# Patient Record
Sex: MALE | Race: BLACK OR AFRICAN AMERICAN | Employment: OTHER | URBAN - METROPOLITAN AREA
[De-identification: names, ages, dates, MRNs, and addresses within clinical notes are randomized per-mention and may not be internally consistent; named-entity substitution may affect disease eponyms.]

---

## 2017-01-01 ENCOUNTER — IP HISTORICAL/CONVERTED ENCOUNTER (OUTPATIENT)
Dept: OTHER | Age: 67
End: 2017-01-01

## 2018-07-08 ENCOUNTER — IP HISTORICAL/CONVERTED ENCOUNTER (OUTPATIENT)
Dept: OTHER | Age: 68
End: 2018-07-08

## 2019-01-01 ENCOUNTER — ED HISTORICAL/CONVERTED ENCOUNTER (OUTPATIENT)
Dept: OTHER | Age: 69
End: 2019-01-01

## 2019-01-01 ENCOUNTER — OP HISTORICAL/CONVERTED ENCOUNTER (OUTPATIENT)
Dept: OTHER | Age: 69
End: 2019-01-01

## 2019-03-23 ENCOUNTER — ED HISTORICAL/CONVERTED ENCOUNTER (OUTPATIENT)
Dept: OTHER | Age: 69
End: 2019-03-23

## 2019-03-25 ENCOUNTER — ED HISTORICAL/CONVERTED ENCOUNTER (OUTPATIENT)
Dept: OTHER | Age: 69
End: 2019-03-25

## 2020-01-01 ENCOUNTER — APPOINTMENT (OUTPATIENT)
Dept: GENERAL RADIOLOGY | Age: 70
DRG: 870 | End: 2020-01-01
Attending: INTERNAL MEDICINE
Payer: MEDICARE

## 2020-01-01 ENCOUNTER — TELEPHONE (OUTPATIENT)
Dept: SURGERY | Age: 70
End: 2020-01-01

## 2020-01-01 ENCOUNTER — OFFICE VISIT (OUTPATIENT)
Dept: SURGERY | Age: 70
End: 2020-01-01
Payer: MEDICARE

## 2020-01-01 ENCOUNTER — IP HISTORICAL/CONVERTED ENCOUNTER (OUTPATIENT)
Dept: OTHER | Age: 70
End: 2020-01-01

## 2020-01-01 ENCOUNTER — APPOINTMENT (OUTPATIENT)
Dept: CT IMAGING | Age: 70
DRG: 291 | End: 2020-01-01
Attending: EMERGENCY MEDICINE
Payer: MEDICARE

## 2020-01-01 ENCOUNTER — HOSPITAL ENCOUNTER (INPATIENT)
Age: 70
LOS: 11 days | DRG: 870 | End: 2020-10-06
Attending: INTERNAL MEDICINE | Admitting: HOSPITALIST
Payer: MEDICARE

## 2020-01-01 ENCOUNTER — APPOINTMENT (OUTPATIENT)
Dept: GENERAL RADIOLOGY | Age: 70
DRG: 291 | End: 2020-01-01
Attending: EMERGENCY MEDICINE
Payer: MEDICARE

## 2020-01-01 ENCOUNTER — APPOINTMENT (OUTPATIENT)
Dept: GENERAL RADIOLOGY | Age: 70
End: 2020-01-01
Attending: EMERGENCY MEDICINE
Payer: MEDICARE

## 2020-01-01 ENCOUNTER — OP HISTORICAL/CONVERTED ENCOUNTER (OUTPATIENT)
Dept: OTHER | Age: 70
End: 2020-01-01

## 2020-01-01 ENCOUNTER — APPOINTMENT (OUTPATIENT)
Dept: GENERAL RADIOLOGY | Age: 70
DRG: 291 | End: 2020-01-01
Attending: HOSPITALIST
Payer: MEDICARE

## 2020-01-01 ENCOUNTER — HOSPITAL ENCOUNTER (EMERGENCY)
Age: 70
Discharge: HOME | End: 2020-09-20
Attending: EMERGENCY MEDICINE | Admitting: EMERGENCY MEDICINE
Payer: MEDICARE

## 2020-01-01 ENCOUNTER — HOSPITAL ENCOUNTER (INPATIENT)
Age: 70
LOS: 4 days | Discharge: ANOTHER HOSPITAL | DRG: 291 | End: 2020-09-25
Attending: EMERGENCY MEDICINE | Admitting: HOSPITALIST
Payer: MEDICARE

## 2020-01-01 ENCOUNTER — ED HISTORICAL/CONVERTED ENCOUNTER (OUTPATIENT)
Dept: OTHER | Age: 70
End: 2020-01-01

## 2020-01-01 ENCOUNTER — HOSPITAL ENCOUNTER (OUTPATIENT)
Dept: LAB | Age: 70
Discharge: HOME | End: 2020-08-25

## 2020-01-01 VITALS
TEMPERATURE: 98.3 F | DIASTOLIC BLOOD PRESSURE: 52 MMHG | OXYGEN SATURATION: 100 % | HEIGHT: 69 IN | WEIGHT: 172.84 LBS | BODY MASS INDEX: 25.6 KG/M2 | SYSTOLIC BLOOD PRESSURE: 97 MMHG | HEART RATE: 116 BPM | RESPIRATION RATE: 12 BRPM

## 2020-01-01 VITALS
WEIGHT: 151.3 LBS | SYSTOLIC BLOOD PRESSURE: 123 MMHG | TEMPERATURE: 93.2 F | DIASTOLIC BLOOD PRESSURE: 72 MMHG | BODY MASS INDEX: 22.41 KG/M2 | HEIGHT: 69 IN | OXYGEN SATURATION: 93 % | HEART RATE: 120 BPM | RESPIRATION RATE: 16 BRPM

## 2020-01-01 VITALS
SYSTOLIC BLOOD PRESSURE: 189 MMHG | DIASTOLIC BLOOD PRESSURE: 93 MMHG | TEMPERATURE: 99.6 F | BODY MASS INDEX: 25.01 KG/M2 | OXYGEN SATURATION: 92 % | RESPIRATION RATE: 18 BRPM | HEART RATE: 98 BPM | WEIGHT: 165 LBS | HEIGHT: 68 IN

## 2020-01-01 VITALS
BODY MASS INDEX: 24.29 KG/M2 | DIASTOLIC BLOOD PRESSURE: 57 MMHG | OXYGEN SATURATION: 97 % | HEIGHT: 69 IN | TEMPERATURE: 97.7 F | SYSTOLIC BLOOD PRESSURE: 126 MMHG | WEIGHT: 164 LBS | HEART RATE: 85 BPM

## 2020-01-01 VITALS — TEMPERATURE: 100.7 F

## 2020-01-01 DIAGNOSIS — T82.590D MALFUNCTION OF ARTERIOVENOUS DIALYSIS FISTULA, SUBSEQUENT ENCOUNTER: Primary | ICD-10-CM

## 2020-01-01 DIAGNOSIS — I50.41 ACUTE COMBINED SYSTOLIC AND DIASTOLIC CONGESTIVE HEART FAILURE (HCC): Primary | ICD-10-CM

## 2020-01-01 DIAGNOSIS — N18.6 ANEMIA DUE TO CHRONIC KIDNEY DISEASE, ON CHRONIC DIALYSIS (HCC): ICD-10-CM

## 2020-01-01 DIAGNOSIS — D63.1 ANEMIA DUE TO CHRONIC KIDNEY DISEASE, ON CHRONIC DIALYSIS (HCC): ICD-10-CM

## 2020-01-01 DIAGNOSIS — T82.898S AV FISTULA OCCLUSION, SEQUELA: Primary | ICD-10-CM

## 2020-01-01 DIAGNOSIS — R10.84 GENERALIZED ABDOMINAL PAIN: ICD-10-CM

## 2020-01-01 DIAGNOSIS — Z99.2 ANEMIA DUE TO CHRONIC KIDNEY DISEASE, ON CHRONIC DIALYSIS (HCC): ICD-10-CM

## 2020-01-01 DIAGNOSIS — G89.4 CHRONIC PAIN SYNDROME: ICD-10-CM

## 2020-01-01 DIAGNOSIS — M79.602 LEFT ARM PAIN: Primary | ICD-10-CM

## 2020-01-01 DIAGNOSIS — U07.1 COVID-19 VIRUS INFECTION: Primary | ICD-10-CM

## 2020-01-01 DIAGNOSIS — K29.70 GASTRITIS WITHOUT BLEEDING, UNSPECIFIED CHRONICITY, UNSPECIFIED GASTRITIS TYPE: ICD-10-CM

## 2020-01-01 DIAGNOSIS — N18.9 CHRONIC RENAL FAILURE, UNSPECIFIED CKD STAGE: ICD-10-CM

## 2020-01-01 LAB
ABO + RH BLD: NORMAL
ALBUMIN SERPL-MCNC: 1.2 G/DL (ref 3.5–5)
ALBUMIN SERPL-MCNC: 1.3 G/DL (ref 3.5–5)
ALBUMIN SERPL-MCNC: 1.4 G/DL (ref 3.5–5)
ALBUMIN SERPL-MCNC: 1.6 G/DL (ref 3.5–5)
ALBUMIN SERPL-MCNC: 1.8 G/DL (ref 3.5–5)
ALBUMIN SERPL-MCNC: 1.9 G/DL (ref 3.5–5)
ALBUMIN SERPL-MCNC: 2 G/DL (ref 3.5–5)
ALBUMIN SERPL-MCNC: 2.1 G/DL (ref 3.5–5)
ALBUMIN SERPL-MCNC: 2.1 G/DL (ref 3.5–5)
ALBUMIN SERPL-MCNC: 2.4 G/DL (ref 3.5–5)
ALBUMIN SERPL-MCNC: 2.5 G/DL (ref 3.5–5)
ALBUMIN SERPL-MCNC: 2.8 G/DL (ref 3.5–5)
ALBUMIN/GLOB SERPL: 0.3 {RATIO} (ref 1.1–2.2)
ALBUMIN/GLOB SERPL: 0.5 {RATIO} (ref 1.1–2.2)
ALP SERPL-CCNC: 125 U/L (ref 45–117)
ALP SERPL-CCNC: 134 U/L (ref 45–117)
ALP SERPL-CCNC: 134 U/L (ref 45–117)
ALP SERPL-CCNC: 154 U/L (ref 45–117)
ALP SERPL-CCNC: 211 U/L (ref 45–117)
ALT SERPL-CCNC: 144 U/L (ref 12–78)
ALT SERPL-CCNC: 160 U/L (ref 12–78)
ALT SERPL-CCNC: 21 U/L (ref 12–78)
ALT SERPL-CCNC: 28 U/L (ref 12–78)
ALT SERPL-CCNC: 31 U/L (ref 12–78)
AMYLASE SERPL-CCNC: 83 U/L (ref 25–115)
ANION GAP SERPL CALC-SCNC: 10 MMOL/L (ref 5–15)
ANION GAP SERPL CALC-SCNC: 10 MMOL/L (ref 5–15)
ANION GAP SERPL CALC-SCNC: 11 MMOL/L (ref 5–15)
ANION GAP SERPL CALC-SCNC: 12 MMOL/L (ref 5–15)
ANION GAP SERPL CALC-SCNC: 12 MMOL/L (ref 5–15)
ANION GAP SERPL CALC-SCNC: 14 MMOL/L (ref 5–15)
ANION GAP SERPL CALC-SCNC: 17 MMOL/L (ref 5–15)
ANION GAP SERPL CALC-SCNC: 27 MMOL/L (ref 5–15)
ANION GAP SERPL CALC-SCNC: 7 MMOL/L (ref 5–15)
ANION GAP SERPL CALC-SCNC: 8 MMOL/L (ref 5–15)
APTT PPP: 25.8 SEC (ref 25–37.1)
APTT PPP: 37.8 SEC (ref 25–37.1)
ARTERIAL PATENCY WRIST A: ABNORMAL
ARTERIAL PATENCY WRIST A: POSITIVE
ARTERIAL PATENCY WRIST A: POSITIVE
ARTERIAL PATENCY WRIST A: YES
AST SERPL W P-5'-P-CCNC: 28 U/L (ref 15–37)
AST SERPL W P-5'-P-CCNC: 304 U/L (ref 15–37)
AST SERPL W P-5'-P-CCNC: 321 U/L (ref 15–37)
AST SERPL W P-5'-P-CCNC: 33 U/L (ref 15–37)
AST SERPL W P-5'-P-CCNC: 93 U/L (ref 15–37)
ATRIAL RATE: 0 BPM
ATRIAL RATE: 105 BPM
ATRIAL RATE: 88 BPM
BASE DEFICIT BLDA-SCNC: 0.1 MMOL/L (ref 0–2)
BASE DEFICIT BLDA-SCNC: 1.4 MMOL/L (ref 0–2)
BASE DEFICIT BLDA-SCNC: 1.5 MMOL/L (ref 0–2)
BASE DEFICIT BLDA-SCNC: 1.8 MMOL/L (ref 0–2)
BASE DEFICIT BLDA-SCNC: 17 MMOL/L (ref 0–2)
BASE DEFICIT BLDA-SCNC: 2.2 MMOL/L (ref 0–2)
BASE DEFICIT BLDA-SCNC: 4.9 MMOL/L (ref 0–2)
BASE EXCESS BLDA CALC-SCNC: 0 MMOL/L (ref 0–2)
BASE EXCESS BLDA CALC-SCNC: 0.9 MMOL/L (ref 0–2)
BASE EXCESS BLDA CALC-SCNC: 5.8 MMOL/L (ref 0–2)
BASOPHILS # BLD: 0 K/UL (ref 0–0.1)
BASOPHILS # BLD: 0 K/UL (ref 0–0.2)
BASOPHILS # BLD: 0.1 K/UL (ref 0–0.2)
BASOPHILS NFR BLD: 0 % (ref 0–1)
BASOPHILS NFR BLD: 0 % (ref 0–2.5)
BASOPHILS NFR BLD: 1 % (ref 0–2.5)
BDY SITE: ABNORMAL
BILIRUB SERPL-MCNC: 0.7 MG/DL (ref 0.2–1)
BILIRUB SERPL-MCNC: 0.9 MG/DL (ref 0.2–1)
BILIRUB SERPL-MCNC: 2.4 MG/DL (ref 0.2–1)
BLD PROD TYP BPU: NORMAL
BLOOD BANK CMNT PATIENT-IMP: NORMAL
BLOOD GROUP ANTIBODIES SERPL: NEGATIVE
BNP SERPL-MCNC: ABNORMAL PG/ML
BPU ID: NORMAL
BREATHS.SPONTANEOUS ON VENT: 20
BUN SERPL-MCNC: 106 MG/DL (ref 6–20)
BUN SERPL-MCNC: 108 MG/DL (ref 6–20)
BUN SERPL-MCNC: 31 MG/DL (ref 6–20)
BUN SERPL-MCNC: 37 MG/DL (ref 6–20)
BUN SERPL-MCNC: 40 MG/DL (ref 6–20)
BUN SERPL-MCNC: 44 MG/DL (ref 6–20)
BUN SERPL-MCNC: 52 MG/DL (ref 6–20)
BUN SERPL-MCNC: 53 MG/DL (ref 6–20)
BUN SERPL-MCNC: 55 MG/DL (ref 6–20)
BUN SERPL-MCNC: 64 MG/DL (ref 6–20)
BUN SERPL-MCNC: 65 MG/DL (ref 6–20)
BUN SERPL-MCNC: 70 MG/DL (ref 6–20)
BUN SERPL-MCNC: 76 MG/DL (ref 6–20)
BUN SERPL-MCNC: 93 MG/DL (ref 6–20)
BUN/CREAT SERPL: 10 (ref 12–20)
BUN/CREAT SERPL: 10 (ref 12–20)
BUN/CREAT SERPL: 11 (ref 12–20)
BUN/CREAT SERPL: 12 (ref 12–20)
BUN/CREAT SERPL: 13 (ref 12–20)
BUN/CREAT SERPL: 14 (ref 12–20)
BUN/CREAT SERPL: 8 (ref 12–20)
BUN/CREAT SERPL: 9 (ref 12–20)
CA-I BLD-MCNC: 10.1 MG/DL (ref 8.5–10.1)
CA-I BLD-MCNC: 10.2 MG/DL (ref 8.5–10.1)
CA-I BLD-MCNC: 10.3 MG/DL (ref 8.5–10.1)
CA-I BLD-MCNC: 10.5 MG/DL (ref 8.5–10.1)
CA-I BLD-MCNC: 11.1 MG/DL (ref 8.5–10.1)
CA-I BLD-MCNC: 8 MG/DL (ref 8.5–10.1)
CA-I BLD-MCNC: 8 MG/DL (ref 8.5–10.1)
CA-I BLD-MCNC: 8.1 MG/DL (ref 8.5–10.1)
CA-I BLD-MCNC: 8.2 MG/DL (ref 8.5–10.1)
CA-I BLD-MCNC: 8.2 MG/DL (ref 8.5–10.1)
CA-I BLD-MCNC: 8.4 MG/DL (ref 8.5–10.1)
CA-I BLD-MCNC: 8.5 MG/DL (ref 8.5–10.1)
CA-I BLD-MCNC: 8.9 MG/DL (ref 8.5–10.1)
CA-I BLD-MCNC: 9.1 MG/DL (ref 8.5–10.1)
CA-I BLD-MCNC: 9.7 MG/DL (ref 8.5–10.1)
CA-I BLD-MCNC: 9.8 MG/DL (ref 8.5–10.1)
CALCULATED P AXIS, ECG09: 59 DEGREES
CALCULATED P AXIS, ECG09: 66 DEGREES
CALCULATED R AXIS, ECG10: -37 DEGREES
CALCULATED R AXIS, ECG10: 2 DEGREES
CALCULATED R AXIS, ECG10: 23 DEGREES
CALCULATED T AXIS, ECG11: -39 DEGREES
CALCULATED T AXIS, ECG11: 115 DEGREES
CALCULATED T AXIS, ECG11: 75 DEGREES
CHLORIDE SERPL-SCNC: 100 MMOL/L (ref 97–108)
CHLORIDE SERPL-SCNC: 102 MMOL/L (ref 97–108)
CHLORIDE SERPL-SCNC: 103 MMOL/L (ref 97–108)
CHLORIDE SERPL-SCNC: 105 MMOL/L (ref 97–108)
CHLORIDE SERPL-SCNC: 87 MMOL/L (ref 97–108)
CHLORIDE SERPL-SCNC: 89 MMOL/L (ref 97–108)
CHLORIDE SERPL-SCNC: 90 MMOL/L (ref 97–108)
CHLORIDE SERPL-SCNC: 93 MMOL/L (ref 97–108)
CHLORIDE SERPL-SCNC: 97 MMOL/L (ref 97–108)
CHLORIDE SERPL-SCNC: 99 MMOL/L (ref 97–108)
CO2 SERPL-SCNC: 12 MMOL/L (ref 21–32)
CO2 SERPL-SCNC: 20 MMOL/L (ref 21–32)
CO2 SERPL-SCNC: 21 MMOL/L (ref 21–32)
CO2 SERPL-SCNC: 22 MMOL/L (ref 21–32)
CO2 SERPL-SCNC: 23 MMOL/L (ref 21–32)
CO2 SERPL-SCNC: 23 MMOL/L (ref 21–32)
CO2 SERPL-SCNC: 24 MMOL/L (ref 21–32)
CO2 SERPL-SCNC: 25 MMOL/L (ref 21–32)
CO2 SERPL-SCNC: 26 MMOL/L (ref 21–32)
CO2 SERPL-SCNC: 27 MMOL/L (ref 21–32)
CO2 SERPL-SCNC: 29 MMOL/L (ref 21–32)
CO2 SERPL-SCNC: 29 MMOL/L (ref 21–32)
COHGB MFR BLD: 0.3 % (ref 0–5)
CREAT SERPL-MCNC: 2.43 MG/DL (ref 0.7–1.3)
CREAT SERPL-MCNC: 2.73 MG/DL (ref 0.7–1.3)
CREAT SERPL-MCNC: 3.25 MG/DL (ref 0.7–1.3)
CREAT SERPL-MCNC: 4.11 MG/DL (ref 0.7–1.3)
CREAT SERPL-MCNC: 4.12 MG/DL (ref 0.7–1.3)
CREAT SERPL-MCNC: 4.87 MG/DL (ref 0.7–1.3)
CREAT SERPL-MCNC: 4.89 MG/DL (ref 0.7–1.3)
CREAT SERPL-MCNC: 4.95 MG/DL (ref 0.7–1.3)
CREAT SERPL-MCNC: 5.29 MG/DL (ref 0.7–1.3)
CREAT SERPL-MCNC: 5.33 MG/DL (ref 0.7–1.3)
CREAT SERPL-MCNC: 5.99 MG/DL (ref 0.7–1.3)
CREAT SERPL-MCNC: 6.99 MG/DL (ref 0.7–1.3)
CREAT SERPL-MCNC: 7.49 MG/DL (ref 0.7–1.3)
CREAT SERPL-MCNC: 7.52 MG/DL (ref 0.7–1.3)
CREAT SERPL-MCNC: 7.86 MG/DL (ref 0.7–1.3)
CREAT SERPL-MCNC: 8.77 MG/DL (ref 0.7–1.3)
CROSSMATCH RESULT,%XM: NORMAL
CULTURE,CULT: ABNORMAL
CULTURE,CULT: ABNORMAL
CULTURE,CULT: NORMAL
DATE LAST DOSE: 0
DATE LAST DOSE: 27
DATE LAST DOSE: ABNORMAL
DATE LAST DOSE: NORMAL
DATE LAST DOSE: NORMAL
DIAGNOSIS, 93000: NORMAL
DIFFERENTIAL METHOD BLD: ABNORMAL
EOSINOPHIL # BLD: 0 K/UL (ref 0–0.4)
EOSINOPHIL # BLD: 0 K/UL (ref 0–0.7)
EOSINOPHIL # BLD: 0 K/UL (ref 0–0.7)
EOSINOPHIL # BLD: 0.1 K/UL (ref 0–0.7)
EOSINOPHIL # BLD: 0.1 K/UL (ref 0–0.7)
EOSINOPHIL NFR BLD: 0 % (ref 0.9–2.9)
EOSINOPHIL NFR BLD: 0 % (ref 0.9–2.9)
EOSINOPHIL NFR BLD: 0 % (ref 0–7)
EOSINOPHIL NFR BLD: 1 % (ref 0.9–2.9)
EOSINOPHIL NFR BLD: 1 % (ref 0.9–2.9)
EPAP/CPAP/PEEP, PAPEEP: 5
ERYTHROCYTE [DISTWIDTH] IN BLOOD BY AUTOMATED COUNT: 17.2 % (ref 11.5–14.5)
ERYTHROCYTE [DISTWIDTH] IN BLOOD BY AUTOMATED COUNT: 17.4 % (ref 11.5–14.5)
ERYTHROCYTE [DISTWIDTH] IN BLOOD BY AUTOMATED COUNT: 17.5 % (ref 11.5–14.5)
ERYTHROCYTE [DISTWIDTH] IN BLOOD BY AUTOMATED COUNT: 17.6 % (ref 11.5–14.5)
ERYTHROCYTE [DISTWIDTH] IN BLOOD BY AUTOMATED COUNT: 17.7 % (ref 11.5–14.5)
ERYTHROCYTE [DISTWIDTH] IN BLOOD BY AUTOMATED COUNT: 17.9 % (ref 11.5–14.5)
ERYTHROCYTE [DISTWIDTH] IN BLOOD BY AUTOMATED COUNT: 18.1 % (ref 11.5–14.5)
ERYTHROCYTE [DISTWIDTH] IN BLOOD BY AUTOMATED COUNT: 18.1 % (ref 11.5–14.5)
ERYTHROCYTE [DISTWIDTH] IN BLOOD BY AUTOMATED COUNT: 18.2 % (ref 11.5–14.5)
ERYTHROCYTE [DISTWIDTH] IN BLOOD BY AUTOMATED COUNT: 18.9 % (ref 11.5–14.5)
FERRITIN SERPL-MCNC: 240 NG/ML (ref 26–388)
FIO2 ON VENT: 100 %
FIO2 ON VENT: 25 %
FIO2 ON VENT: 30 %
FIO2 ON VENT: 30 %
FIO2 ON VENT: 35 %
FOLATE SERPL-MCNC: 4.5 NG/ML (ref 5–21)
GAS FLOW.O2 SETTING OXYMISER: 12 L/MIN
GAS FLOW.O2 SETTING OXYMISER: 12 L/MIN
GAS FLOW.O2 SETTING OXYMISER: 18 L/MIN
GAS FLOW.O2 SETTING OXYMISER: 8 L/MIN
GLOBULIN SER CALC-MCNC: 4.3 G/DL (ref 2–4)
GLOBULIN SER CALC-MCNC: 4.6 G/DL (ref 2–4)
GLOBULIN SER CALC-MCNC: 4.9 G/DL (ref 2–4)
GLOBULIN SER CALC-MCNC: 5.2 G/DL (ref 2–4)
GLOBULIN SER CALC-MCNC: 5.6 G/DL (ref 2–4)
GLUCOSE BLD STRIP.AUTO-MCNC: 146 MG/DL (ref 65–100)
GLUCOSE BLD STRIP.AUTO-MCNC: 165 MG/DL (ref 65–100)
GLUCOSE BLD STRIP.AUTO-MCNC: 170 MG/DL (ref 65–100)
GLUCOSE BLD STRIP.AUTO-MCNC: 172 MG/DL (ref 65–100)
GLUCOSE BLD STRIP.AUTO-MCNC: 172 MG/DL (ref 65–100)
GLUCOSE BLD STRIP.AUTO-MCNC: 173 MG/DL (ref 65–100)
GLUCOSE BLD STRIP.AUTO-MCNC: 175 MG/DL (ref 65–100)
GLUCOSE BLD STRIP.AUTO-MCNC: 188 MG/DL (ref 65–100)
GLUCOSE BLD STRIP.AUTO-MCNC: 193 MG/DL (ref 65–100)
GLUCOSE BLD STRIP.AUTO-MCNC: 193 MG/DL (ref 65–100)
GLUCOSE BLD STRIP.AUTO-MCNC: 198 MG/DL (ref 65–100)
GLUCOSE BLD STRIP.AUTO-MCNC: 231 MG/DL (ref 65–100)
GLUCOSE BLD STRIP.AUTO-MCNC: 234 MG/DL (ref 65–100)
GLUCOSE BLD STRIP.AUTO-MCNC: 246 MG/DL (ref 65–100)
GLUCOSE BLD STRIP.AUTO-MCNC: 262 MG/DL (ref 65–100)
GLUCOSE BLD STRIP.AUTO-MCNC: 288 MG/DL (ref 65–100)
GLUCOSE BLD STRIP.AUTO-MCNC: 291 MG/DL (ref 65–100)
GLUCOSE BLD STRIP.AUTO-MCNC: 292 MG/DL (ref 65–100)
GLUCOSE BLD STRIP.AUTO-MCNC: 300 MG/DL (ref 65–100)
GLUCOSE BLD STRIP.AUTO-MCNC: 364 MG/DL (ref 65–100)
GLUCOSE BLD STRIP.AUTO-MCNC: 75 MG/DL (ref 65–100)
GLUCOSE SERPL-MCNC: 121 MG/DL (ref 65–100)
GLUCOSE SERPL-MCNC: 142 MG/DL (ref 65–100)
GLUCOSE SERPL-MCNC: 146 MG/DL (ref 65–100)
GLUCOSE SERPL-MCNC: 152 MG/DL (ref 65–100)
GLUCOSE SERPL-MCNC: 163 MG/DL (ref 65–100)
GLUCOSE SERPL-MCNC: 178 MG/DL (ref 65–100)
GLUCOSE SERPL-MCNC: 178 MG/DL (ref 65–100)
GLUCOSE SERPL-MCNC: 191 MG/DL (ref 65–100)
GLUCOSE SERPL-MCNC: 191 MG/DL (ref 65–100)
GLUCOSE SERPL-MCNC: 214 MG/DL (ref 65–100)
GLUCOSE SERPL-MCNC: 229 MG/DL (ref 65–100)
GLUCOSE SERPL-MCNC: 238 MG/DL (ref 65–100)
GLUCOSE SERPL-MCNC: 295 MG/DL (ref 65–100)
GLUCOSE SERPL-MCNC: 309 MG/DL (ref 65–100)
GLUCOSE SERPL-MCNC: 371 MG/DL (ref 65–100)
GLUCOSE SERPL-MCNC: 487 MG/DL (ref 65–100)
GRAM STN SPEC: NORMAL
HBV SURFACE AB SER QL: NONREACTIVE
HBV SURFACE AB SER-ACNC: <3.1 MIU/ML
HBV SURFACE AG SER QL: 0.14 INDEX
HBV SURFACE AG SER QL: NEGATIVE
HCO3 BLDA-SCNC: 10 MMOL/L (ref 22–26)
HCO3 BLDA-SCNC: 20 MMOL/L (ref 22–26)
HCO3 BLDA-SCNC: 23 MMOL/L (ref 22–26)
HCO3 BLDA-SCNC: 24 MMOL/L (ref 22–26)
HCO3 BLDA-SCNC: 25 MMOL/L (ref 22–26)
HCO3 BLDA-SCNC: 25 MMOL/L (ref 22–26)
HCO3 BLDA-SCNC: 30 MMOL/L (ref 22–26)
HCT VFR BLD AUTO: 19.6 % (ref 36.6–50.3)
HCT VFR BLD AUTO: 21.7 % (ref 36.6–50.3)
HCT VFR BLD AUTO: 22.8 % (ref 36.6–50.3)
HCT VFR BLD AUTO: 23.2 % (ref 36.6–50.3)
HCT VFR BLD AUTO: 23.8 % (ref 36.6–50.3)
HCT VFR BLD AUTO: 24.1 % (ref 36.6–50.3)
HCT VFR BLD AUTO: 25 % (ref 36.6–50.3)
HCT VFR BLD AUTO: 25.3 % (ref 36.6–50.3)
HCT VFR BLD AUTO: 27.9 % (ref 36.6–50.3)
HCT VFR BLD AUTO: 28 % (ref 41–53)
HCT VFR BLD AUTO: 28 % (ref 41–53)
HCT VFR BLD AUTO: 28.5 % (ref 36.6–50.3)
HCT VFR BLD AUTO: 28.5 % (ref 41–53)
HCT VFR BLD AUTO: 28.9 % (ref 41–53)
HCT VFR BLD AUTO: 30 % (ref 36.6–50.3)
HCT VFR BLD AUTO: 30.3 % (ref 41–53)
HCV AB SER IA-ACNC: >11 INDEX
HCV AB SERPL QL IA: REACTIVE
HCV COMMENT,HCGAC: ABNORMAL
HGB BLD OXIMETRY-MCNC: 11.2 G/DL (ref 13.5–17.5)
HGB BLD-MCNC: 6.4 G/DL (ref 12.1–17)
HGB BLD-MCNC: 7.1 % (ref 12.1–17)
HGB BLD-MCNC: 7.6 G/DL (ref 12.1–17)
HGB BLD-MCNC: 8 G/DL (ref 12.1–17)
HGB BLD-MCNC: 8.2 % (ref 12.1–17)
HGB BLD-MCNC: 9.2 % (ref 12.1–17)
HGB BLD-MCNC: 9.2 % (ref 13.5–17.5)
HGB BLD-MCNC: 9.2 % (ref 13.5–17.5)
HGB BLD-MCNC: 9.3 % (ref 12.1–17)
HGB BLD-MCNC: 9.3 % (ref 13.5–17.5)
HGB BLD-MCNC: 9.3 % (ref 13.5–17.5)
HGB BLD-MCNC: 9.6 % (ref 13.5–17.5)
HGB BLD-MCNC: 9.9 % (ref 12.1–17)
IMM GRANULOCYTES # BLD AUTO: 0.1 K/UL (ref 0–0.04)
IMM GRANULOCYTES NFR BLD AUTO: 0 % (ref 0–0.5)
IMM GRANULOCYTES NFR BLD AUTO: 1 % (ref 0–0.5)
INR PPP: 1 (ref 0.9–1.1)
INR PPP: 1.6 (ref 0.9–1.1)
INR PPP: 1.6 (ref 0.9–1.1)
IRON SATN MFR SERPL: 17 % (ref 20–50)
IRON SATN MFR SERPL: 6 % (ref 20–50)
IRON SERPL-MCNC: 18 UG/DL (ref 35–150)
IRON SERPL-MCNC: 35 UG/DL (ref 35–150)
LACTATE SERPL-SCNC: 1.4 MMOL/L (ref 0.4–2)
LACTATE SERPL-SCNC: 2 MMOL/L (ref 0.4–2)
LACTATE SERPL-SCNC: 2.8 MMOL/L (ref 0.4–2)
LIPASE SERPL-CCNC: 48 U/L (ref 73–393)
LIPASE SERPL-CCNC: 59 U/L (ref 73–393)
LYMPHOCYTES # BLD: 0.2 K/UL (ref 1–4.8)
LYMPHOCYTES # BLD: 0.3 K/UL (ref 1–4.8)
LYMPHOCYTES # BLD: 0.3 K/UL (ref 1–4.8)
LYMPHOCYTES # BLD: 0.4 K/UL (ref 0.8–3.5)
LYMPHOCYTES # BLD: 0.4 K/UL (ref 1–4.8)
LYMPHOCYTES # BLD: 0.5 K/UL (ref 0.8–3.5)
LYMPHOCYTES # BLD: 0.5 K/UL (ref 0.8–3.5)
LYMPHOCYTES # BLD: 0.7 K/UL (ref 0.8–3.5)
LYMPHOCYTES # BLD: 0.7 K/UL (ref 0.8–3.5)
LYMPHOCYTES # BLD: 1.1 K/UL (ref 0.8–3.5)
LYMPHOCYTES # BLD: 1.2 K/UL (ref 0.8–3.5)
LYMPHOCYTES # BLD: 1.4 K/UL (ref 0.8–3.5)
LYMPHOCYTES NFR BLD: 2 % (ref 12–49)
LYMPHOCYTES NFR BLD: 2 % (ref 20.5–51.1)
LYMPHOCYTES NFR BLD: 2 % (ref 20.5–51.1)
LYMPHOCYTES NFR BLD: 3 % (ref 12–49)
LYMPHOCYTES NFR BLD: 3 % (ref 12–49)
LYMPHOCYTES NFR BLD: 3 % (ref 20.5–51.1)
LYMPHOCYTES NFR BLD: 4 % (ref 12–49)
LYMPHOCYTES NFR BLD: 4 % (ref 20.5–51.1)
LYMPHOCYTES NFR BLD: 5 % (ref 12–49)
LYMPHOCYTES NFR BLD: 6 % (ref 12–49)
LYMPHOCYTES NFR BLD: 8 % (ref 12–49)
LYMPHOCYTES NFR BLD: 8 % (ref 12–49)
MAGNESIUM SERPL-MCNC: 1.9 MG/DL (ref 1.6–2.4)
MAGNESIUM SERPL-MCNC: 2.2 MG/DL (ref 1.6–2.4)
MAGNESIUM SERPL-MCNC: 2.2 MG/DL (ref 1.6–2.4)
MAGNESIUM SERPL-MCNC: 2.4 MG/DL (ref 1.6–2.4)
MAGNESIUM SERPL-MCNC: 2.6 MG/DL (ref 1.6–2.4)
MAGNESIUM SERPL-MCNC: 3.1 MG/DL (ref 1.6–2.4)
MCH RBC QN AUTO: 29.9 PG (ref 26–34)
MCH RBC QN AUTO: 30.3 PG (ref 26–34)
MCH RBC QN AUTO: 30.6 PG (ref 26–34)
MCH RBC QN AUTO: 30.6 PG (ref 26–34)
MCH RBC QN AUTO: 30.7 PG (ref 26–34)
MCH RBC QN AUTO: 31.2 PG (ref 26–34)
MCH RBC QN AUTO: 31.4 PG (ref 26–34)
MCH RBC QN AUTO: 31.6 PG (ref 26–34)
MCH RBC QN AUTO: 31.7 PG (ref 26–34)
MCH RBC QN AUTO: 33.1 PG (ref 31–34)
MCH RBC QN AUTO: 33.7 PG (ref 31–34)
MCH RBC QN AUTO: 33.7 PG (ref 31–34)
MCH RBC QN AUTO: 33.9 PG (ref 31–34)
MCH RBC QN AUTO: 33.9 PG (ref 31–34)
MCHC RBC AUTO-ENTMCNC: 30.5 G/DL (ref 31–36)
MCHC RBC AUTO-ENTMCNC: 32 G/DL (ref 30–36.5)
MCHC RBC AUTO-ENTMCNC: 32.6 G/DL (ref 30–36.5)
MCHC RBC AUTO-ENTMCNC: 32.7 G/DL (ref 30–36.5)
MCHC RBC AUTO-ENTMCNC: 32.7 G/DL (ref 30–36.5)
MCHC RBC AUTO-ENTMCNC: 32.7 G/DL (ref 31–36)
MCHC RBC AUTO-ENTMCNC: 32.8 G/DL (ref 30–36.5)
MCHC RBC AUTO-ENTMCNC: 32.8 G/DL (ref 31–36)
MCHC RBC AUTO-ENTMCNC: 33 G/DL (ref 30–36.5)
MCHC RBC AUTO-ENTMCNC: 33 G/DL (ref 30–36.5)
MCHC RBC AUTO-ENTMCNC: 33.1 G/DL (ref 31–36)
MCHC RBC AUTO-ENTMCNC: 33.1 G/DL (ref 31–36)
MCHC RBC AUTO-ENTMCNC: 33.2 G/DL (ref 30–36.5)
MCHC RBC AUTO-ENTMCNC: 33.3 G/DL (ref 30–36.5)
MCV RBC AUTO: 101.7 FL (ref 80–100)
MCV RBC AUTO: 102.4 FL (ref 80–100)
MCV RBC AUTO: 102.8 FL (ref 80–100)
MCV RBC AUTO: 103.6 FL (ref 80–100)
MCV RBC AUTO: 108.5 FL (ref 80–100)
MCV RBC AUTO: 91.3 FL (ref 80–99)
MCV RBC AUTO: 91.9 FL (ref 80–99)
MCV RBC AUTO: 92.3 FL (ref 80–99)
MCV RBC AUTO: 93.8 FL (ref 80–99)
MCV RBC AUTO: 94.6 FL (ref 80–99)
MCV RBC AUTO: 94.7 FL (ref 80–99)
MCV RBC AUTO: 96.2 FL (ref 80–99)
MCV RBC AUTO: 96.3 FL (ref 80–99)
MCV RBC AUTO: 96.4 FL (ref 80–99)
METHGB MFR BLD: 0.1 % (ref 0–5)
MONOCYTES # BLD: 0.3 K/UL (ref 0–1)
MONOCYTES # BLD: 0.6 K/UL (ref 0.2–2.4)
MONOCYTES # BLD: 0.6 K/UL (ref 0–1)
MONOCYTES # BLD: 0.8 K/UL (ref 0.2–2.4)
MONOCYTES # BLD: 0.8 K/UL (ref 0.2–2.4)
MONOCYTES # BLD: 0.9 K/UL (ref 0.2–2.4)
MONOCYTES # BLD: 1 K/UL (ref 0–1)
MONOCYTES # BLD: 1.2 K/UL (ref 0–1)
MONOCYTES # BLD: 1.5 K/UL (ref 0–1)
MONOCYTES # BLD: 1.5 K/UL (ref 0–1)
MONOCYTES # BLD: 2 K/UL (ref 0–1)
MONOCYTES # BLD: 2 K/UL (ref 0–1)
MONOCYTES NFR BLD: 10 % (ref 1.7–9.3)
MONOCYTES NFR BLD: 10 % (ref 5–13)
MONOCYTES NFR BLD: 10 % (ref 5–13)
MONOCYTES NFR BLD: 3 % (ref 5–13)
MONOCYTES NFR BLD: 3 % (ref 5–13)
MONOCYTES NFR BLD: 4 % (ref 1.7–9.3)
MONOCYTES NFR BLD: 5 % (ref 5–13)
MONOCYTES NFR BLD: 8 % (ref 1.7–9.3)
MONOCYTES NFR BLD: 8 % (ref 5–13)
MONOCYTES NFR BLD: 9 % (ref 1.7–9.3)
MONOCYTES NFR BLD: 9 % (ref 5–13)
MONOCYTES NFR BLD: 9 % (ref 5–13)
NEUTS SEG # BLD: 10.3 K/UL (ref 1.8–8)
NEUTS SEG # BLD: 11.4 K/UL (ref 1.8–8)
NEUTS SEG # BLD: 12.4 K/UL (ref 1.8–8)
NEUTS SEG # BLD: 14.4 K/UL (ref 1.8–7.7)
NEUTS SEG # BLD: 15.7 K/UL (ref 1.8–8)
NEUTS SEG # BLD: 16.9 K/UL (ref 1.8–8)
NEUTS SEG # BLD: 17.3 K/UL (ref 1.8–8)
NEUTS SEG # BLD: 18 K/UL (ref 1.8–8)
NEUTS SEG # BLD: 20.6 K/UL (ref 1.8–8)
NEUTS SEG # BLD: 7.3 K/UL (ref 1.8–7.7)
NEUTS SEG # BLD: 8.2 K/UL (ref 1.8–7.7)
NEUTS SEG # BLD: 8.6 K/UL (ref 1.8–7.7)
NEUTS SEG NFR BLD: 83 % (ref 32–75)
NEUTS SEG NFR BLD: 84 % (ref 32–75)
NEUTS SEG NFR BLD: 85 % (ref 42–75)
NEUTS SEG NFR BLD: 87 % (ref 32–75)
NEUTS SEG NFR BLD: 87 % (ref 32–75)
NEUTS SEG NFR BLD: 87 % (ref 42–75)
NEUTS SEG NFR BLD: 88 % (ref 32–75)
NEUTS SEG NFR BLD: 89 % (ref 32–75)
NEUTS SEG NFR BLD: 89 % (ref 32–75)
NEUTS SEG NFR BLD: 90 % (ref 42–75)
NEUTS SEG NFR BLD: 92 % (ref 32–75)
NEUTS SEG NFR BLD: 93 % (ref 42–75)
NRBC # BLD: 0 K/UL
NRBC # BLD: 0 K/UL (ref 0–0.01)
NRBC # BLD: 0.01 K/UL
NRBC # BLD: 0.01 K/UL
NRBC BLD-RTO: 0 PER 100 WBC
NRBC BLD-RTO: 10 PER 100 WBC
NRBC BLD-RTO: 10 PER 100 WBC
OXYHGB MFR BLD: 99.3 % (ref 95–100)
P-R INTERVAL, ECG05: 114 MS
P-R INTERVAL, ECG05: 156 MS
P-R INTERVAL, ECG05: 190 MS
PCO2 BLDA: 25 MMHG (ref 35–45)
PCO2 BLDA: 26 MMHG (ref 35–45)
PCO2 BLDA: 27 MMHG (ref 35–45)
PCO2 BLDA: 28 MMHG (ref 35–45)
PCO2 BLDA: 28 MMHG (ref 35–45)
PCO2 BLDA: 29 MMHG (ref 35–45)
PCO2 BLDA: 30 MMHG (ref 35–45)
PCO2 BLDA: 30 MMHG (ref 35–45)
PCO2 BLDA: 31 MMHG (ref 35–45)
PCO2 BLDA: 32 MMHG (ref 35–45)
PEEP MAX SETTING VENT: 5 CM[H2O]
PERFORMED BY, TECHID: ABNORMAL
PERFORMED BY, TECHID: NORMAL
PH BLDA: 7.2 [PH] (ref 7.35–7.45)
PH BLDA: 7.43 [PH] (ref 7.35–7.45)
PH BLDA: 7.46 [PH] (ref 7.35–7.45)
PH BLDA: 7.48 [PH] (ref 7.35–7.45)
PH BLDA: 7.49 [PH] (ref 7.35–7.45)
PH BLDA: 7.54 [PH] (ref 7.35–7.45)
PH BLDA: 7.56 [PH] (ref 7.35–7.45)
PHOSPHATE SERPL-MCNC: 2.8 MG/DL (ref 2.6–4.7)
PHOSPHATE SERPL-MCNC: 3.2 MG/DL (ref 2.6–4.7)
PHOSPHATE SERPL-MCNC: 3.9 MG/DL (ref 2.6–4.7)
PHOSPHATE SERPL-MCNC: 4.5 MG/DL (ref 2.6–4.7)
PHOSPHATE SERPL-MCNC: 4.6 MG/DL (ref 2.6–4.7)
PHOSPHATE SERPL-MCNC: 4.7 MG/DL (ref 2.6–4.7)
PHOSPHATE SERPL-MCNC: 4.7 MG/DL (ref 2.6–4.7)
PHOSPHATE SERPL-MCNC: 4.9 MG/DL (ref 2.6–4.7)
PHOSPHATE SERPL-MCNC: 6.4 MG/DL (ref 2.6–4.7)
PHOSPHATE SERPL-MCNC: 7.4 MG/DL (ref 2.6–4.7)
PHOSPHATE SERPL-MCNC: 8.3 MG/DL (ref 2.6–4.7)
PLATELET # BLD AUTO: 115 K/UL (ref 150–400)
PLATELET # BLD AUTO: 119 K/UL
PLATELET # BLD AUTO: 125 K/UL
PLATELET # BLD AUTO: 128 K/UL
PLATELET # BLD AUTO: 143 K/UL
PLATELET # BLD AUTO: 151 K/UL (ref 150–400)
PLATELET # BLD AUTO: 153 K/UL
PLATELET # BLD AUTO: 80 K/UL (ref 150–400)
PLATELET # BLD AUTO: 82 K/UL (ref 150–400)
PLATELET # BLD AUTO: 83 K/UL (ref 150–400)
PLATELET # BLD AUTO: 87 K/UL (ref 150–400)
PLATELET # BLD AUTO: 91 K/UL (ref 150–400)
PLATELET # BLD AUTO: 92 K/UL (ref 150–400)
PLATELET # BLD AUTO: 98 K/UL (ref 150–400)
PMV BLD AUTO: 10.7 FL (ref 6.5–11.5)
PMV BLD AUTO: 11.7 FL (ref 6.5–11.5)
PMV BLD AUTO: 13.3 FL (ref 8.9–12.9)
PMV BLD AUTO: 13.7 FL (ref 8.9–12.9)
PMV BLD AUTO: 9.3 FL (ref 6.5–11.5)
PMV BLD AUTO: 9.6 FL (ref 6.5–11.5)
PMV BLD AUTO: 9.8 FL (ref 6.5–11.5)
PO2 BLDA: 103 MMHG (ref 75–100)
PO2 BLDA: 103 MMHG (ref 75–100)
PO2 BLDA: 107 MMHG (ref 75–100)
PO2 BLDA: 141 MMHG (ref 75–100)
PO2 BLDA: 161 MMHG (ref 75–100)
PO2 BLDA: 168 MMHG (ref 75–100)
PO2 BLDA: 181 MMHG (ref 75–100)
PO2 BLDA: 470 MMHG (ref 70–100)
PO2 BLDA: 88 MMHG (ref 75–100)
PO2 BLDA: 90 MMHG (ref 75–100)
POTASSIUM SERPL-SCNC: 3.8 MMOL/L (ref 3.5–5.1)
POTASSIUM SERPL-SCNC: 3.9 MMOL/L (ref 3.5–5.1)
POTASSIUM SERPL-SCNC: 4.1 MMOL/L (ref 3.5–5.1)
POTASSIUM SERPL-SCNC: 4.1 MMOL/L (ref 3.5–5.1)
POTASSIUM SERPL-SCNC: 4.2 MMOL/L (ref 3.5–5.1)
POTASSIUM SERPL-SCNC: 4.4 MMOL/L (ref 3.5–5.1)
POTASSIUM SERPL-SCNC: 4.4 MMOL/L (ref 3.5–5.1)
POTASSIUM SERPL-SCNC: 4.8 MMOL/L (ref 3.5–5.1)
POTASSIUM SERPL-SCNC: 4.8 MMOL/L (ref 3.5–5.1)
POTASSIUM SERPL-SCNC: 4.9 MMOL/L (ref 3.5–5.1)
POTASSIUM SERPL-SCNC: 5.1 MMOL/L (ref 3.5–5.1)
POTASSIUM SERPL-SCNC: 5.4 MMOL/L (ref 3.5–5.1)
POTASSIUM SERPL-SCNC: 5.5 MMOL/L (ref 3.5–5.1)
POTASSIUM SERPL-SCNC: 5.5 MMOL/L (ref 3.5–5.1)
POTASSIUM SERPL-SCNC: 5.6 MMOL/L (ref 3.5–5.1)
POTASSIUM SERPL-SCNC: 5.9 MMOL/L (ref 3.5–5.1)
PRESSURE SUPPORT SETTING VENT: 10 CM[H2O]
PRESSURE SUPPORT SETTING VENT: 10 CM[H2O]
PRESSURE SUPPORT SETTING VENT: 15 CM[H2O]
PROT SERPL-MCNC: 6.4 G/DL (ref 6.4–8.2)
PROT SERPL-MCNC: 6.7 G/DL (ref 6.4–8.2)
PROT SERPL-MCNC: 7 G/DL (ref 6.4–8.2)
PROT SERPL-MCNC: 7.4 G/DL (ref 6.4–8.2)
PROT SERPL-MCNC: 8 G/DL (ref 6.4–8.2)
PROTHROMBIN TIME: 10.3 SEC (ref 9–11.1)
PROTHROMBIN TIME: 15.7 SEC (ref 9–11.1)
PROTHROMBIN TIME: 19.2 SEC (ref 11.9–14.7)
Q-T INTERVAL, ECG07: 298 MS
Q-T INTERVAL, ECG07: 342 MS
Q-T INTERVAL, ECG07: 396 MS
QRS DURATION, ECG06: 103 MS
QRS DURATION, ECG06: 113 MS
QRS DURATION, ECG06: 86 MS
QTC CALCULATION (BEZET), ECG08: 438 MS
QTC CALCULATION (BEZET), ECG08: 453 MS
QTC CALCULATION (BEZET), ECG08: 479 MS
RBC # BLD AUTO: 2.09 M/UL (ref 4.1–5.7)
RBC # BLD AUTO: 2.25 M/UL (ref 4.1–5.7)
RBC # BLD AUTO: 2.48 M/UL (ref 4.1–5.7)
RBC # BLD AUTO: 2.54 M/UL (ref 4.1–5.7)
RBC # BLD AUTO: 2.61 M/UL (ref 4.1–5.7)
RBC # BLD AUTO: 2.64 M/UL (ref 4.1–5.7)
RBC # BLD AUTO: 2.73 M/UL (ref 4.5–5.9)
RBC # BLD AUTO: 2.75 M/UL (ref 4.5–5.9)
RBC # BLD AUTO: 2.75 M/UL (ref 4.5–5.9)
RBC # BLD AUTO: 2.79 M/UL (ref 4.5–5.9)
RBC # BLD AUTO: 2.82 M/UL (ref 4.5–5.9)
RBC # BLD AUTO: 2.9 M/UL (ref 4.1–5.7)
RBC # BLD AUTO: 2.96 M/UL (ref 4.1–5.7)
RBC # BLD AUTO: 3.17 M/UL (ref 4.1–5.7)
REPORTED DOSE,DOSE: 0 UNITS
REPORTED DOSE,DOSE: 27 UNITS
REPORTED DOSE,DOSE: ABNORMAL UNITS
REPORTED DOSE,DOSE: NORMAL UNITS
REPORTED DOSE,DOSE: NORMAL UNITS
SAO2 % BLD: 100 %
SAO2 % BLD: 98 %
SAO2 % BLD: 98 %
SAO2 % BLD: 99 %
SAO2% DEVICE SAO2% SENSOR NAME: ABNORMAL
SARS-COV-2, COV2NT: NOT DETECTED
SODIUM SERPL-SCNC: 123 MMOL/L (ref 136–145)
SODIUM SERPL-SCNC: 126 MMOL/L (ref 136–145)
SODIUM SERPL-SCNC: 126 MMOL/L (ref 136–145)
SODIUM SERPL-SCNC: 129 MMOL/L (ref 136–145)
SODIUM SERPL-SCNC: 131 MMOL/L (ref 136–145)
SODIUM SERPL-SCNC: 131 MMOL/L (ref 136–145)
SODIUM SERPL-SCNC: 133 MMOL/L (ref 136–145)
SODIUM SERPL-SCNC: 133 MMOL/L (ref 136–145)
SODIUM SERPL-SCNC: 134 MMOL/L (ref 136–145)
SODIUM SERPL-SCNC: 135 MMOL/L (ref 136–145)
SODIUM SERPL-SCNC: 135 MMOL/L (ref 136–145)
SODIUM SERPL-SCNC: 136 MMOL/L (ref 136–145)
SODIUM SERPL-SCNC: 137 MMOL/L (ref 136–145)
SODIUM SERPL-SCNC: 139 MMOL/L (ref 136–145)
SPECIAL REQUESTS,SREQ: ABNORMAL
SPECIAL REQUESTS,SREQ: NORMAL
SPECIMEN EXP DATE BLD: NORMAL
SPECIMEN SITE: ABNORMAL
STATUS OF UNIT,%ST: NORMAL
THERAPEUTIC RANGE,PTTT: ABNORMAL SEC (ref 68–109)
THERAPEUTIC RANGE,PTTT: NORMAL SEC (ref 68–109)
TIBC SERPL-MCNC: 210 UG/DL (ref 250–450)
TIBC SERPL-MCNC: 323 UG/DL (ref 250–450)
TRANSFUSION STATUS PATIENT QL: NORMAL
TROPONIN I SERPL-MCNC: 0.51 NG/ML
TROPONIN I SERPL-MCNC: 0.65 NG/ML
TROPONIN I SERPL-MCNC: 0.96 NG/ML
UNIT DIVISION, %UDIV: 0
VANCOMYCIN SERPL-MCNC: 10.4 UG/ML
VANCOMYCIN SERPL-MCNC: 14.3 UG/ML
VANCOMYCIN SERPL-MCNC: 17.9 UG/ML
VANCOMYCIN SERPL-MCNC: 27 UG/ML
VANCOMYCIN TROUGH SERPL-MCNC: 19.7 UG/ML (ref 5–10)
VENTILATION MODE VENT: ABNORMAL
VENTRICULAR RATE, ECG03: 105 BPM
VENTRICULAR RATE, ECG03: 130 BPM
VENTRICULAR RATE, ECG03: 88 BPM
VIT B12 SERPL-MCNC: 1490 PG/ML (ref 193–986)
VT SETTING VENT: 500 ML
WBC # BLD AUTO: 11 K/UL (ref 4.1–11.1)
WBC # BLD AUTO: 12.7 K/UL (ref 4.4–11.3)
WBC # BLD AUTO: 13.7 K/UL (ref 4.1–11.1)
WBC # BLD AUTO: 14.6 K/UL (ref 4.1–11.1)
WBC # BLD AUTO: 15.4 K/UL (ref 4.4–11.3)
WBC # BLD AUTO: 16.2 K/UL (ref 4.1–11.1)
WBC # BLD AUTO: 17.7 K/UL (ref 4.1–11.1)
WBC # BLD AUTO: 19 K/UL (ref 4.1–11.1)
WBC # BLD AUTO: 19.5 K/UL (ref 4.1–11.1)
WBC # BLD AUTO: 20.5 K/UL (ref 4.1–11.1)
WBC # BLD AUTO: 23.3 K/UL (ref 4.1–11.1)
WBC # BLD AUTO: 8.5 K/UL (ref 4.4–11.3)
WBC # BLD AUTO: 9.2 K/UL (ref 4.4–11.3)
WBC # BLD AUTO: 9.9 K/UL (ref 4.4–11.3)

## 2020-01-01 PROCEDURE — 80069 RENAL FUNCTION PANEL: CPT

## 2020-01-01 PROCEDURE — 36600 WITHDRAWAL OF ARTERIAL BLOOD: CPT

## 2020-01-01 PROCEDURE — 74011250637 HC RX REV CODE- 250/637: Performed by: NURSE PRACTITIONER

## 2020-01-01 PROCEDURE — 74011250637 HC RX REV CODE- 250/637: Performed by: INTERNAL MEDICINE

## 2020-01-01 PROCEDURE — 82962 GLUCOSE BLOOD TEST: CPT

## 2020-01-01 PROCEDURE — 85025 COMPLETE CBC W/AUTO DIFF WBC: CPT

## 2020-01-01 PROCEDURE — 82803 BLOOD GASES ANY COMBINATION: CPT

## 2020-01-01 PROCEDURE — 74011250636 HC RX REV CODE- 250/636

## 2020-01-01 PROCEDURE — 85610 PROTHROMBIN TIME: CPT

## 2020-01-01 PROCEDURE — 96375 TX/PRO/DX INJ NEW DRUG ADDON: CPT

## 2020-01-01 PROCEDURE — 74011250637 HC RX REV CODE- 250/637: Performed by: EMERGENCY MEDICINE

## 2020-01-01 PROCEDURE — 90935 HEMODIALYSIS ONE EVALUATION: CPT

## 2020-01-01 PROCEDURE — 94003 VENT MGMT INPAT SUBQ DAY: CPT

## 2020-01-01 PROCEDURE — 74011250636 HC RX REV CODE- 250/636: Performed by: HOSPITALIST

## 2020-01-01 PROCEDURE — 87186 SC STD MICRODIL/AGAR DIL: CPT

## 2020-01-01 PROCEDURE — 74011000250 HC RX REV CODE- 250: Performed by: HOSPITALIST

## 2020-01-01 PROCEDURE — 36415 COLL VENOUS BLD VENIPUNCTURE: CPT

## 2020-01-01 PROCEDURE — 5A1D90Z PERFORMANCE OF URINARY FILTRATION, CONTINUOUS, GREATER THAN 18 HOURS PER DAY: ICD-10-PCS | Performed by: LEGAL MEDICINE

## 2020-01-01 PROCEDURE — 71045 X-RAY EXAM CHEST 1 VIEW: CPT

## 2020-01-01 PROCEDURE — 77010033678 HC OXYGEN DAILY

## 2020-01-01 PROCEDURE — 80202 ASSAY OF VANCOMYCIN: CPT

## 2020-01-01 PROCEDURE — 94640 AIRWAY INHALATION TREATMENT: CPT

## 2020-01-01 PROCEDURE — 65270000029 HC RM PRIVATE

## 2020-01-01 PROCEDURE — 74011250637 HC RX REV CODE- 250/637: Performed by: HOSPITALIST

## 2020-01-01 PROCEDURE — 85018 HEMOGLOBIN: CPT

## 2020-01-01 PROCEDURE — 99000 SPECIMEN HANDLING OFFICE-LAB: CPT

## 2020-01-01 PROCEDURE — 94400 HC END TIDAL CO2 RESPONSE CURVE: CPT

## 2020-01-01 PROCEDURE — 74011250636 HC RX REV CODE- 250/636: Performed by: INTERNAL MEDICINE

## 2020-01-01 PROCEDURE — 06HN33Z INSERTION OF INFUSION DEVICE INTO LEFT FEMORAL VEIN, PERCUTANEOUS APPROACH: ICD-10-PCS | Performed by: INTERNAL MEDICINE

## 2020-01-01 PROCEDURE — 80048 BASIC METABOLIC PNL TOTAL CA: CPT

## 2020-01-01 PROCEDURE — 85730 THROMBOPLASTIN TIME PARTIAL: CPT

## 2020-01-01 PROCEDURE — 65610000006 HC RM INTENSIVE CARE

## 2020-01-01 PROCEDURE — 80053 COMPREHEN METABOLIC PANEL: CPT

## 2020-01-01 PROCEDURE — 96374 THER/PROPH/DIAG INJ IV PUSH: CPT

## 2020-01-01 PROCEDURE — 83735 ASSAY OF MAGNESIUM: CPT

## 2020-01-01 PROCEDURE — 93005 ELECTROCARDIOGRAM TRACING: CPT

## 2020-01-01 PROCEDURE — 36430 TRANSFUSION BLD/BLD COMPNT: CPT

## 2020-01-01 PROCEDURE — 5A1D70Z PERFORMANCE OF URINARY FILTRATION, INTERMITTENT, LESS THAN 6 HOURS PER DAY: ICD-10-PCS | Performed by: LEGAL MEDICINE

## 2020-01-01 PROCEDURE — 30233N1 TRANSFUSION OF NONAUTOLOGOUS RED BLOOD CELLS INTO PERIPHERAL VEIN, PERCUTANEOUS APPROACH: ICD-10-PCS | Performed by: INTERNAL MEDICINE

## 2020-01-01 PROCEDURE — 92950 HEART/LUNG RESUSCITATION CPR: CPT

## 2020-01-01 PROCEDURE — 87147 CULTURE TYPE IMMUNOLOGIC: CPT

## 2020-01-01 PROCEDURE — 02HV33Z INSERTION OF INFUSION DEVICE INTO SUPERIOR VENA CAVA, PERCUTANEOUS APPROACH: ICD-10-PCS | Performed by: HOSPITALIST

## 2020-01-01 PROCEDURE — 82746 ASSAY OF FOLIC ACID SERUM: CPT

## 2020-01-01 PROCEDURE — 83605 ASSAY OF LACTIC ACID: CPT

## 2020-01-01 PROCEDURE — 82728 ASSAY OF FERRITIN: CPT

## 2020-01-01 PROCEDURE — 83540 ASSAY OF IRON: CPT

## 2020-01-01 PROCEDURE — 74011000258 HC RX REV CODE- 258: Performed by: HOSPITALIST

## 2020-01-01 PROCEDURE — 65660000000 HC RM CCU STEPDOWN

## 2020-01-01 PROCEDURE — 74011250636 HC RX REV CODE- 250/636: Performed by: NURSE PRACTITIONER

## 2020-01-01 PROCEDURE — 86706 HEP B SURFACE ANTIBODY: CPT

## 2020-01-01 PROCEDURE — 74176 CT ABD & PELVIS W/O CONTRAST: CPT

## 2020-01-01 PROCEDURE — 0BH17EZ INSERTION OF ENDOTRACHEAL AIRWAY INTO TRACHEA, VIA NATURAL OR ARTIFICIAL OPENING: ICD-10-PCS | Performed by: HOSPITALIST

## 2020-01-01 PROCEDURE — 83880 ASSAY OF NATRIURETIC PEPTIDE: CPT

## 2020-01-01 PROCEDURE — 74018 RADEX ABDOMEN 1 VIEW: CPT

## 2020-01-01 PROCEDURE — 84100 ASSAY OF PHOSPHORUS: CPT

## 2020-01-01 PROCEDURE — 90945 DIALYSIS ONE EVALUATION: CPT

## 2020-01-01 PROCEDURE — 87340 HEPATITIS B SURFACE AG IA: CPT

## 2020-01-01 PROCEDURE — 84484 ASSAY OF TROPONIN QUANT: CPT

## 2020-01-01 PROCEDURE — 74011000250 HC RX REV CODE- 250: Performed by: INTERNAL MEDICINE

## 2020-01-01 PROCEDURE — 85027 COMPLETE CBC AUTOMATED: CPT

## 2020-01-01 PROCEDURE — 87040 BLOOD CULTURE FOR BACTERIA: CPT

## 2020-01-01 PROCEDURE — 5A1D70Z PERFORMANCE OF URINARY FILTRATION, INTERMITTENT, LESS THAN 6 HOURS PER DAY: ICD-10-PCS | Performed by: HOSPITALIST

## 2020-01-01 PROCEDURE — 94002 VENT MGMT INPAT INIT DAY: CPT

## 2020-01-01 PROCEDURE — 74011000258 HC RX REV CODE- 258: Performed by: NURSE PRACTITIONER

## 2020-01-01 PROCEDURE — 99285 EMERGENCY DEPT VISIT HI MDM: CPT

## 2020-01-01 PROCEDURE — 87635 SARS-COV-2 COVID-19 AMP PRB: CPT

## 2020-01-01 PROCEDURE — 82150 ASSAY OF AMYLASE: CPT

## 2020-01-01 PROCEDURE — 99024 POSTOP FOLLOW-UP VISIT: CPT | Performed by: SURGERY

## 2020-01-01 PROCEDURE — 85014 HEMATOCRIT: CPT

## 2020-01-01 PROCEDURE — 87077 CULTURE AEROBIC IDENTIFY: CPT

## 2020-01-01 PROCEDURE — 86923 COMPATIBILITY TEST ELECTRIC: CPT

## 2020-01-01 PROCEDURE — 99283 EMERGENCY DEPT VISIT LOW MDM: CPT

## 2020-01-01 PROCEDURE — 82607 VITAMIN B-12: CPT

## 2020-01-01 PROCEDURE — P9016 RBC LEUKOCYTES REDUCED: HCPCS

## 2020-01-01 PROCEDURE — 74011000250 HC RX REV CODE- 250

## 2020-01-01 PROCEDURE — 83690 ASSAY OF LIPASE: CPT

## 2020-01-01 PROCEDURE — 74011000250 HC RX REV CODE- 250: Performed by: NURSE PRACTITIONER

## 2020-01-01 PROCEDURE — 74011250636 HC RX REV CODE- 250/636: Performed by: EMERGENCY MEDICINE

## 2020-01-01 PROCEDURE — 86900 BLOOD TYPING SEROLOGIC ABO: CPT

## 2020-01-01 PROCEDURE — 87070 CULTURE OTHR SPECIMN AEROBIC: CPT

## 2020-01-01 PROCEDURE — 99213 OFFICE O/P EST LOW 20 MIN: CPT | Performed by: SURGERY

## 2020-01-01 PROCEDURE — 5A1935Z RESPIRATORY VENTILATION, LESS THAN 24 CONSECUTIVE HOURS: ICD-10-PCS | Performed by: HOSPITALIST

## 2020-01-01 PROCEDURE — 3E033XZ INTRODUCTION OF VASOPRESSOR INTO PERIPHERAL VEIN, PERCUTANEOUS APPROACH: ICD-10-PCS | Performed by: HOSPITALIST

## 2020-01-01 PROCEDURE — 86803 HEPATITIS C AB TEST: CPT

## 2020-01-01 PROCEDURE — 74011636637 HC RX REV CODE- 636/637: Performed by: INTERNAL MEDICINE

## 2020-01-01 PROCEDURE — 5A1955Z RESPIRATORY VENTILATION, GREATER THAN 96 CONSECUTIVE HOURS: ICD-10-PCS | Performed by: HOSPITALIST

## 2020-01-01 PROCEDURE — 94668 MNPJ CHEST WALL SBSQ: CPT

## 2020-01-01 PROCEDURE — 99222 1ST HOSP IP/OBS MODERATE 55: CPT | Performed by: INTERNAL MEDICINE

## 2020-01-01 PROCEDURE — 94667 MNPJ CHEST WALL 1ST: CPT

## 2020-01-01 RX ORDER — DORZOLAMIDE HCL 20 MG/ML
1 SOLUTION/ DROPS OPHTHALMIC 3 TIMES DAILY
Status: DISCONTINUED | OUTPATIENT
Start: 2020-01-01 | End: 2020-01-01 | Stop reason: HOSPADM

## 2020-01-01 RX ORDER — LEVOFLOXACIN 5 MG/ML
750 INJECTION, SOLUTION INTRAVENOUS ONCE
Status: DISCONTINUED | OUTPATIENT
Start: 2020-01-01 | End: 2020-01-01 | Stop reason: HOSPADM

## 2020-01-01 RX ORDER — DOPAMINE HYDROCHLORIDE 160 MG/100ML
INJECTION, SOLUTION INTRAVENOUS
Status: COMPLETED
Start: 2020-01-01 | End: 2020-01-01

## 2020-01-01 RX ORDER — MIDAZOLAM HYDROCHLORIDE 5 MG/ML
5 INJECTION, SOLUTION INTRAMUSCULAR; INTRAVENOUS ONCE
Status: COMPLETED | OUTPATIENT
Start: 2020-01-01 | End: 2020-01-01

## 2020-01-01 RX ORDER — NOREPINEPHRINE BITARTRATE/D5W 4MG/250ML
.5-16 PLASTIC BAG, INJECTION (ML) INTRAVENOUS
Status: DISCONTINUED | OUTPATIENT
Start: 2020-01-01 | End: 2020-01-01

## 2020-01-01 RX ORDER — IPRATROPIUM BROMIDE AND ALBUTEROL SULFATE 2.5; .5 MG/3ML; MG/3ML
3 SOLUTION RESPIRATORY (INHALATION)
Status: DISCONTINUED | OUTPATIENT
Start: 2020-01-01 | End: 2020-01-01 | Stop reason: HOSPADM

## 2020-01-01 RX ORDER — OXYCODONE HYDROCHLORIDE 15 MG/1
15 TABLET, FILM COATED, EXTENDED RELEASE ORAL EVERY 12 HOURS
COMMUNITY

## 2020-01-01 RX ORDER — MORPHINE SULFATE 2 MG/ML
2 INJECTION, SOLUTION INTRAMUSCULAR; INTRAVENOUS
Status: COMPLETED | OUTPATIENT
Start: 2020-01-01 | End: 2020-01-01

## 2020-01-01 RX ORDER — MIDAZOLAM HYDROCHLORIDE 1 MG/ML
INJECTION, SOLUTION INTRAMUSCULAR; INTRAVENOUS
Status: COMPLETED
Start: 2020-01-01 | End: 2020-01-01

## 2020-01-01 RX ORDER — SODIUM BICARBONATE 1 MEQ/ML
SYRINGE (ML) INTRAVENOUS
Status: DISCONTINUED
Start: 2020-01-01 | End: 2020-01-01 | Stop reason: HOSPADM

## 2020-01-01 RX ORDER — SODIUM BICARBONATE 1 MEQ/ML
SYRINGE (ML) INTRAVENOUS
Status: COMPLETED | OUTPATIENT
Start: 2020-01-01 | End: 2020-01-01

## 2020-01-01 RX ORDER — HEPARIN SODIUM 5000 [USP'U]/ML
4000 INJECTION, SOLUTION INTRAVENOUS; SUBCUTANEOUS ONCE
Status: DISCONTINUED | OUTPATIENT
Start: 2020-01-01 | End: 2020-01-01

## 2020-01-01 RX ORDER — DOCUSATE SODIUM 100 MG/1
100 CAPSULE, LIQUID FILLED ORAL 2 TIMES DAILY
Status: DISCONTINUED | OUTPATIENT
Start: 2020-01-01 | End: 2020-01-01 | Stop reason: HOSPADM

## 2020-01-01 RX ORDER — PANTOPRAZOLE SODIUM 40 MG/1
40 GRANULE, DELAYED RELEASE ORAL
Status: DISCONTINUED | OUTPATIENT
Start: 2020-01-01 | End: 2020-01-01 | Stop reason: HOSPADM

## 2020-01-01 RX ORDER — ALBUMIN HUMAN 250 G/1000ML
SOLUTION INTRAVENOUS
Status: DISCONTINUED
Start: 2020-01-01 | End: 2020-01-01 | Stop reason: HOSPADM

## 2020-01-01 RX ORDER — LIDOCAINE HYDROCHLORIDE 20 MG/ML
50 INJECTION, SOLUTION EPIDURAL; INFILTRATION; INTRACAUDAL; PERINEURAL ONCE
Status: CANCELLED | OUTPATIENT
Start: 2020-01-01 | End: 2020-01-01

## 2020-01-01 RX ORDER — ENOXAPARIN SODIUM 100 MG/ML
30 INJECTION SUBCUTANEOUS DAILY
Status: DISCONTINUED | OUTPATIENT
Start: 2020-01-01 | End: 2020-01-01

## 2020-01-01 RX ORDER — METRONIDAZOLE 500 MG/100ML
500 INJECTION, SOLUTION INTRAVENOUS EVERY 8 HOURS
Status: DISCONTINUED | OUTPATIENT
Start: 2020-01-01 | End: 2020-01-01

## 2020-01-01 RX ORDER — SEVELAMER CARBONATE 800 MG/1
2400 TABLET, FILM COATED ORAL
Status: DISCONTINUED | OUTPATIENT
Start: 2020-01-01 | End: 2020-01-01 | Stop reason: ALTCHOICE

## 2020-01-01 RX ORDER — EPINEPHRINE 0.1 MG/ML
INJECTION INTRACARDIAC; INTRAVENOUS
Status: COMPLETED | OUTPATIENT
Start: 2020-01-01 | End: 2020-01-01

## 2020-01-01 RX ORDER — CARVEDILOL 12.5 MG/1
25 TABLET ORAL 2 TIMES DAILY WITH MEALS
Status: DISCONTINUED | OUTPATIENT
Start: 2020-01-01 | End: 2020-01-01 | Stop reason: HOSPADM

## 2020-01-01 RX ORDER — MIRTAZAPINE 15 MG/1
15 TABLET, FILM COATED ORAL
Status: DISCONTINUED | OUTPATIENT
Start: 2020-01-01 | End: 2020-01-01 | Stop reason: HOSPADM

## 2020-01-01 RX ORDER — MAGNESIUM SULFATE HEPTAHYDRATE 40 MG/ML
INJECTION, SOLUTION INTRAVENOUS
Status: COMPLETED | OUTPATIENT
Start: 2020-01-01 | End: 2020-01-01

## 2020-01-01 RX ORDER — SODIUM BICARBONATE 1 MEQ/ML
100 SYRINGE (ML) INTRAVENOUS ONCE
Status: ACTIVE | OUTPATIENT
Start: 2020-01-01 | End: 2020-01-01

## 2020-01-01 RX ORDER — LANOLIN ALCOHOL/MO/W.PET/CERES
1 CREAM (GRAM) TOPICAL 2 TIMES DAILY WITH MEALS
Status: DISCONTINUED | OUTPATIENT
Start: 2020-01-01 | End: 2020-01-01 | Stop reason: ALTCHOICE

## 2020-01-01 RX ORDER — METRONIDAZOLE 250 MG/1
500 TABLET ORAL EVERY 8 HOURS
Status: DISCONTINUED | OUTPATIENT
Start: 2020-01-01 | End: 2020-01-01 | Stop reason: HOSPADM

## 2020-01-01 RX ORDER — ALBUMIN HUMAN 250 G/1000ML
12.5 SOLUTION INTRAVENOUS ONCE
Status: DISCONTINUED | OUTPATIENT
Start: 2020-01-01 | End: 2020-01-01

## 2020-01-01 RX ORDER — SEVELAMER CARBONATE FOR ORAL SUSPENSION 2400 MG/1
2.4 POWDER, FOR SUSPENSION ORAL
Status: DISCONTINUED | OUTPATIENT
Start: 2020-01-01 | End: 2020-01-01 | Stop reason: HOSPADM

## 2020-01-01 RX ORDER — LEVOFLOXACIN 5 MG/ML
750 INJECTION, SOLUTION INTRAVENOUS ONCE
Status: COMPLETED | OUTPATIENT
Start: 2020-01-01 | End: 2020-01-01

## 2020-01-01 RX ORDER — DOCUSATE SODIUM 50 MG/5ML
100 LIQUID ORAL 2 TIMES DAILY
Status: DISCONTINUED | OUTPATIENT
Start: 2020-01-01 | End: 2020-01-01 | Stop reason: HOSPADM

## 2020-01-01 RX ORDER — ACETAMINOPHEN 325 MG/1
650 TABLET ORAL
Status: DISCONTINUED | OUTPATIENT
Start: 2020-01-01 | End: 2020-01-01 | Stop reason: HOSPADM

## 2020-01-01 RX ORDER — BRIMONIDINE TARTRATE 2 MG/ML
1 SOLUTION/ DROPS OPHTHALMIC 3 TIMES DAILY
Status: DISCONTINUED | OUTPATIENT
Start: 2020-01-01 | End: 2020-01-01 | Stop reason: HOSPADM

## 2020-01-01 RX ORDER — CALCIUM CHLORIDE INJECTION 100 MG/ML
INJECTION, SOLUTION INTRAVENOUS
Status: COMPLETED | OUTPATIENT
Start: 2020-01-01 | End: 2020-01-01

## 2020-01-01 RX ORDER — ATORVASTATIN CALCIUM 40 MG/1
40 TABLET, FILM COATED ORAL
Status: DISCONTINUED | OUTPATIENT
Start: 2020-01-01 | End: 2020-01-01 | Stop reason: HOSPADM

## 2020-01-01 RX ORDER — OXYCODONE HYDROCHLORIDE 10 MG/1
TABLET ORAL
COMMUNITY
Start: 2020-01-01 | End: 2020-01-01 | Stop reason: ALTCHOICE

## 2020-01-01 RX ORDER — ATROPINE SULFATE 0.1 MG/ML
INJECTION INTRAVENOUS
Status: COMPLETED
Start: 2020-01-01 | End: 2020-01-01

## 2020-01-01 RX ORDER — FERROUS SULFATE 300 MG/5ML
300 LIQUID (ML) ORAL 2 TIMES DAILY WITH MEALS
Status: DISCONTINUED | OUTPATIENT
Start: 2020-01-01 | End: 2020-01-01 | Stop reason: HOSPADM

## 2020-01-01 RX ORDER — ONDANSETRON 2 MG/ML
4 INJECTION INTRAMUSCULAR; INTRAVENOUS
Status: DISCONTINUED | OUTPATIENT
Start: 2020-01-01 | End: 2020-01-01 | Stop reason: HOSPADM

## 2020-01-01 RX ORDER — HYDRALAZINE HYDROCHLORIDE 25 MG/1
25 TABLET, FILM COATED ORAL 3 TIMES DAILY
Status: DISCONTINUED | OUTPATIENT
Start: 2020-01-01 | End: 2020-01-01

## 2020-01-01 RX ORDER — DOPAMINE HYDROCHLORIDE 160 MG/100ML
0-50 INJECTION, SOLUTION INTRAVENOUS
Status: DISCONTINUED | OUTPATIENT
Start: 2020-01-01 | End: 2020-01-01 | Stop reason: HOSPADM

## 2020-01-01 RX ORDER — FUROSEMIDE 10 MG/ML
40 INJECTION INTRAMUSCULAR; INTRAVENOUS
Status: COMPLETED | OUTPATIENT
Start: 2020-01-01 | End: 2020-01-01

## 2020-01-01 RX ORDER — AMLODIPINE BESYLATE 10 MG/1
10 TABLET ORAL DAILY
Status: DISCONTINUED | OUTPATIENT
Start: 2020-01-01 | End: 2020-01-01

## 2020-01-01 RX ORDER — EPINEPHRINE 1 MG/ML
INJECTION, SOLUTION, CONCENTRATE INTRAVENOUS
Status: DISCONTINUED
Start: 2020-01-01 | End: 2020-01-01 | Stop reason: HOSPADM

## 2020-01-01 RX ORDER — SODIUM CHLORIDE, SODIUM LACTATE, POTASSIUM CHLORIDE, CALCIUM CHLORIDE 600; 310; 30; 20 MG/100ML; MG/100ML; MG/100ML; MG/100ML
100 INJECTION, SOLUTION INTRAVENOUS CONTINUOUS
Status: DISCONTINUED | OUTPATIENT
Start: 2020-01-01 | End: 2020-01-01

## 2020-01-01 RX ORDER — CARVEDILOL 12.5 MG/1
25 TABLET ORAL 2 TIMES DAILY WITH MEALS
Status: DISCONTINUED | OUTPATIENT
Start: 2020-01-01 | End: 2020-01-01

## 2020-01-01 RX ORDER — SODIUM BICARBONATE 650 MG/1
1300 TABLET ORAL 3 TIMES DAILY
Status: DISCONTINUED | OUTPATIENT
Start: 2020-01-01 | End: 2020-01-01

## 2020-01-01 RX ORDER — SODIUM BICARBONATE 650 MG/1
650 TABLET ORAL 3 TIMES DAILY
Status: DISCONTINUED | OUTPATIENT
Start: 2020-01-01 | End: 2020-01-01

## 2020-01-01 RX ORDER — HEPARIN SODIUM 1000 [USP'U]/ML
3600 INJECTION, SOLUTION INTRAVENOUS; SUBCUTANEOUS ONCE
Status: COMPLETED | OUTPATIENT
Start: 2020-01-01 | End: 2020-01-01

## 2020-01-01 RX ORDER — MORPHINE SULFATE 2 MG/ML
1 INJECTION, SOLUTION INTRAMUSCULAR; INTRAVENOUS
Status: DISCONTINUED | OUTPATIENT
Start: 2020-01-01 | End: 2020-01-01

## 2020-01-01 RX ORDER — HEPARIN SODIUM 1000 [USP'U]/ML
INJECTION, SOLUTION INTRAVENOUS; SUBCUTANEOUS
Status: COMPLETED
Start: 2020-01-01 | End: 2020-01-01

## 2020-01-01 RX ORDER — MORPHINE SULFATE 2 MG/ML
2 INJECTION, SOLUTION INTRAMUSCULAR; INTRAVENOUS
Status: DISCONTINUED | OUTPATIENT
Start: 2020-01-01 | End: 2020-01-01

## 2020-01-01 RX ORDER — METOCLOPRAMIDE HYDROCHLORIDE 5 MG/ML
5 INJECTION INTRAMUSCULAR; INTRAVENOUS EVERY 6 HOURS
Status: DISCONTINUED | OUTPATIENT
Start: 2020-01-01 | End: 2020-01-01 | Stop reason: HOSPADM

## 2020-01-01 RX ORDER — SODIUM CHLORIDE 0.9 % (FLUSH) 0.9 %
5-40 SYRINGE (ML) INJECTION AS NEEDED
Status: DISCONTINUED | OUTPATIENT
Start: 2020-01-01 | End: 2020-01-01 | Stop reason: HOSPADM

## 2020-01-01 RX ORDER — LEVOFLOXACIN 5 MG/ML
750 INJECTION, SOLUTION INTRAVENOUS
Status: DISCONTINUED | OUTPATIENT
Start: 2020-01-01 | End: 2020-01-01

## 2020-01-01 RX ORDER — SODIUM CHLORIDE 0.9 % (FLUSH) 0.9 %
5-40 SYRINGE (ML) INJECTION EVERY 8 HOURS
Status: DISCONTINUED | OUTPATIENT
Start: 2020-01-01 | End: 2020-01-01

## 2020-01-01 RX ORDER — OXYCODONE HYDROCHLORIDE 15 MG/1
15 TABLET, FILM COATED, EXTENDED RELEASE ORAL EVERY 12 HOURS
Status: DISCONTINUED | OUTPATIENT
Start: 2020-01-01 | End: 2020-01-01

## 2020-01-01 RX ORDER — HEPARIN 100 UNIT/ML
300 SYRINGE INTRAVENOUS AS NEEDED
Status: DISCONTINUED | OUTPATIENT
Start: 2020-01-01 | End: 2020-01-01 | Stop reason: HOSPADM

## 2020-01-01 RX ORDER — OXYCODONE HYDROCHLORIDE 5 MG/1
5 TABLET ORAL
Status: DISCONTINUED | OUTPATIENT
Start: 2020-01-01 | End: 2020-01-01 | Stop reason: HOSPADM

## 2020-01-01 RX ORDER — SEVELAMER CARBONATE 800 MG/1
1600 TABLET, FILM COATED ORAL
Status: DISCONTINUED | OUTPATIENT
Start: 2020-01-01 | End: 2020-01-01

## 2020-01-01 RX ORDER — FACIAL-BODY WIPES
10 EACH TOPICAL
Status: COMPLETED | OUTPATIENT
Start: 2020-01-01 | End: 2020-01-01

## 2020-01-01 RX ORDER — DOCUSATE SODIUM 100 MG/1
100 CAPSULE, LIQUID FILLED ORAL DAILY
Status: DISCONTINUED | OUTPATIENT
Start: 2020-01-01 | End: 2020-01-01

## 2020-01-01 RX ORDER — MAGNESIUM SULFATE 100 %
4 CRYSTALS MISCELLANEOUS AS NEEDED
Status: DISCONTINUED | OUTPATIENT
Start: 2020-01-01 | End: 2020-01-01 | Stop reason: HOSPADM

## 2020-01-01 RX ORDER — HEPARIN SODIUM 5000 [USP'U]/ML
5000 INJECTION, SOLUTION INTRAVENOUS; SUBCUTANEOUS EVERY 12 HOURS
Status: DISCONTINUED | OUTPATIENT
Start: 2020-01-01 | End: 2020-01-01 | Stop reason: HOSPADM

## 2020-01-01 RX ORDER — PANTOPRAZOLE SODIUM 40 MG/1
40 TABLET, DELAYED RELEASE ORAL
Status: DISCONTINUED | OUTPATIENT
Start: 2020-01-01 | End: 2020-01-01 | Stop reason: ALTCHOICE

## 2020-01-01 RX ORDER — ACETAMINOPHEN 650 MG/1
650 SUPPOSITORY RECTAL
Status: DISCONTINUED | OUTPATIENT
Start: 2020-01-01 | End: 2020-01-01 | Stop reason: HOSPADM

## 2020-01-01 RX ORDER — SUCRALFATE 1 G/1
1 TABLET ORAL
Status: DISCONTINUED | OUTPATIENT
Start: 2020-01-01 | End: 2020-01-01

## 2020-01-01 RX ORDER — NOREPINEPHRINE BITARTRATE 1 MG/ML
INJECTION, SOLUTION INTRAVENOUS
Status: COMPLETED
Start: 2020-01-01 | End: 2020-01-01

## 2020-01-01 RX ORDER — DEXTROSE 50 % IN WATER (D50W) INTRAVENOUS SYRINGE
Status: COMPLETED | OUTPATIENT
Start: 2020-01-01 | End: 2020-01-01

## 2020-01-01 RX ORDER — NOREPINEPHRINE BITARTRATE/D5W 8 MG/250ML
.5-16 PLASTIC BAG, INJECTION (ML) INTRAVENOUS
Status: DISCONTINUED | OUTPATIENT
Start: 2020-01-01 | End: 2020-01-01 | Stop reason: HOSPADM

## 2020-01-01 RX ORDER — AMLODIPINE BESYLATE 5 MG/1
5 TABLET ORAL DAILY
Status: DISCONTINUED | OUTPATIENT
Start: 2020-01-01 | End: 2020-01-01

## 2020-01-01 RX ORDER — PROMETHAZINE HYDROCHLORIDE 25 MG/1
12.5 TABLET ORAL
Status: DISCONTINUED | OUTPATIENT
Start: 2020-01-01 | End: 2020-01-01 | Stop reason: HOSPADM

## 2020-01-01 RX ORDER — SUCCINYLCHOLINE CHLORIDE 20 MG/ML INJECTION SOLUTION
100 SOLUTION
Status: COMPLETED | OUTPATIENT
Start: 2020-01-01 | End: 2020-01-01

## 2020-01-01 RX ORDER — INSULIN LISPRO 100 [IU]/ML
INJECTION, SOLUTION INTRAVENOUS; SUBCUTANEOUS EVERY 6 HOURS
Status: DISCONTINUED | OUTPATIENT
Start: 2020-01-01 | End: 2020-01-01 | Stop reason: HOSPADM

## 2020-01-01 RX ORDER — SEVELAMER CARBONATE 800 MG/1
2400 TABLET, FILM COATED ORAL
Status: DISCONTINUED | OUTPATIENT
Start: 2020-01-01 | End: 2020-01-01 | Stop reason: HOSPADM

## 2020-01-01 RX ORDER — SODIUM CHLORIDE 0.9 % (FLUSH) 0.9 %
5-40 SYRINGE (ML) INJECTION EVERY 8 HOURS
Status: DISCONTINUED | OUTPATIENT
Start: 2020-01-01 | End: 2020-01-01 | Stop reason: HOSPADM

## 2020-01-01 RX ORDER — OXYCODONE HYDROCHLORIDE 5 MG/1
5 TABLET ORAL
COMMUNITY

## 2020-01-01 RX ORDER — MAGNESIUM SULFATE HEPTAHYDRATE 40 MG/ML
2 INJECTION, SOLUTION INTRAVENOUS AS NEEDED
Status: DISCONTINUED | OUTPATIENT
Start: 2020-01-01 | End: 2020-01-01 | Stop reason: HOSPADM

## 2020-01-01 RX ORDER — POLYETHYLENE GLYCOL 3350 17 G/17G
17 POWDER, FOR SOLUTION ORAL DAILY PRN
Status: DISCONTINUED | OUTPATIENT
Start: 2020-01-01 | End: 2020-01-01 | Stop reason: HOSPADM

## 2020-01-01 RX ORDER — OXYCODONE HCL 10 MG/1
10 TABLET, FILM COATED, EXTENDED RELEASE ORAL EVERY 12 HOURS
Status: DISCONTINUED | OUTPATIENT
Start: 2020-01-01 | End: 2020-01-01

## 2020-01-01 RX ORDER — DOCUSATE SODIUM 100 MG/1
100 CAPSULE, LIQUID FILLED ORAL 2 TIMES DAILY
Status: DISCONTINUED | OUTPATIENT
Start: 2020-01-01 | End: 2020-01-01

## 2020-01-01 RX ORDER — LEVOFLOXACIN 5 MG/ML
500 INJECTION, SOLUTION INTRAVENOUS
Status: DISCONTINUED | OUTPATIENT
Start: 2020-01-01 | End: 2020-01-01

## 2020-01-01 RX ORDER — FAMOTIDINE 20 MG/1
20 TABLET, FILM COATED ORAL DAILY
Status: DISCONTINUED | OUTPATIENT
Start: 2020-01-01 | End: 2020-01-01

## 2020-01-01 RX ORDER — DEXTROSE 50 % IN WATER (D50W) INTRAVENOUS SYRINGE
25-50 AS NEEDED
Status: DISCONTINUED | OUTPATIENT
Start: 2020-01-01 | End: 2020-01-01 | Stop reason: HOSPADM

## 2020-01-01 RX ORDER — POLYETHYLENE GLYCOL 3350 17 G/17G
17 POWDER, FOR SOLUTION ORAL DAILY
Status: DISCONTINUED | OUTPATIENT
Start: 2020-01-01 | End: 2020-01-01 | Stop reason: HOSPADM

## 2020-01-01 RX ORDER — PROPOFOL 10 MG/ML
5 VIAL (ML) INTRAVENOUS
Status: DISCONTINUED | OUTPATIENT
Start: 2020-01-01 | End: 2020-01-01 | Stop reason: HOSPADM

## 2020-01-01 RX ORDER — PROPOFOL 10 MG/ML
INJECTION, EMULSION INTRAVENOUS
Status: DISCONTINUED
Start: 2020-01-01 | End: 2020-01-01 | Stop reason: SDUPTHER

## 2020-01-01 RX ORDER — OXYCODONE HCL 10 MG/1
10 TABLET, FILM COATED, EXTENDED RELEASE ORAL EVERY 12 HOURS
Status: DISCONTINUED | OUTPATIENT
Start: 2020-01-01 | End: 2020-01-01 | Stop reason: HOSPADM

## 2020-01-01 RX ORDER — SODIUM CHLORIDE 9 MG/ML
250 INJECTION, SOLUTION INTRAVENOUS AS NEEDED
Status: DISCONTINUED | OUTPATIENT
Start: 2020-01-01 | End: 2020-01-01 | Stop reason: HOSPADM

## 2020-01-01 RX ORDER — FENTANYL CITRATE 50 UG/ML
50 INJECTION, SOLUTION INTRAMUSCULAR; INTRAVENOUS
Status: COMPLETED | OUTPATIENT
Start: 2020-01-01 | End: 2020-01-01

## 2020-01-01 RX ORDER — FAMOTIDINE 20 MG/1
20 TABLET, FILM COATED ORAL DAILY
Status: DISCONTINUED | OUTPATIENT
Start: 2020-01-01 | End: 2020-01-01 | Stop reason: HOSPADM

## 2020-01-01 RX ORDER — DOPAMINE HYDROCHLORIDE 160 MG/100ML
5 INJECTION, SOLUTION INTRAVENOUS
Status: DISCONTINUED | OUTPATIENT
Start: 2020-01-01 | End: 2020-01-01 | Stop reason: HOSPADM

## 2020-01-01 RX ORDER — LANOLIN ALCOHOL/MO/W.PET/CERES
1 CREAM (GRAM) TOPICAL 2 TIMES DAILY WITH MEALS
Status: DISCONTINUED | OUTPATIENT
Start: 2020-01-01 | End: 2020-01-01 | Stop reason: HOSPADM

## 2020-01-01 RX ORDER — GUAIFENESIN 100 MG/5ML
324 LIQUID (ML) ORAL
Status: COMPLETED | OUTPATIENT
Start: 2020-01-01 | End: 2020-01-01

## 2020-01-01 RX ORDER — ONDANSETRON 2 MG/ML
4 INJECTION INTRAMUSCULAR; INTRAVENOUS
Status: COMPLETED | OUTPATIENT
Start: 2020-01-01 | End: 2020-01-01

## 2020-01-01 RX ORDER — MIDAZOLAM HYDROCHLORIDE 5 MG/ML
5 INJECTION, SOLUTION INTRAMUSCULAR; INTRAVENOUS ONCE
Status: DISCONTINUED | OUTPATIENT
Start: 2020-01-01 | End: 2020-01-01 | Stop reason: HOSPADM

## 2020-01-01 RX ORDER — ALBUMIN HUMAN 250 G/1000ML
25 SOLUTION INTRAVENOUS ONCE
Status: DISCONTINUED | OUTPATIENT
Start: 2020-01-01 | End: 2020-01-01 | Stop reason: HOSPADM

## 2020-01-01 RX ORDER — POTASSIUM CHLORIDE 7.45 MG/ML
20 INJECTION INTRAVENOUS AS NEEDED
Status: DISCONTINUED | OUTPATIENT
Start: 2020-01-01 | End: 2020-01-01 | Stop reason: HOSPADM

## 2020-01-01 RX ORDER — OXYCODONE HYDROCHLORIDE 5 MG/1
5 TABLET ORAL
Qty: 28 TAB | Refills: 0 | Status: SHIPPED | OUTPATIENT
Start: 2020-01-01 | End: 2020-01-01

## 2020-01-01 RX ORDER — PANTOPRAZOLE SODIUM 20 MG/1
40 TABLET, DELAYED RELEASE ORAL
Status: DISCONTINUED | OUTPATIENT
Start: 2020-01-01 | End: 2020-01-01 | Stop reason: HOSPADM

## 2020-01-01 RX ORDER — HEPARIN SODIUM 10000 [USP'U]/ML
5000 INJECTION, SOLUTION INTRAVENOUS; SUBCUTANEOUS EVERY 8 HOURS
Status: DISCONTINUED | OUTPATIENT
Start: 2020-01-01 | End: 2020-01-01 | Stop reason: HOSPADM

## 2020-01-01 RX ADMIN — BRIMONIDINE TARTRATE 1 DROP: 2 SOLUTION OPHTHALMIC at 16:06

## 2020-01-01 RX ADMIN — PANTOPRAZOLE SODIUM 40 MG: 40 GRANULE, DELAYED RELEASE ORAL at 08:46

## 2020-01-01 RX ADMIN — DORZOLAMIDE HYDROCHLORIDE 1 DROP: 20 SOLUTION/ DROPS OPHTHALMIC at 16:44

## 2020-01-01 RX ADMIN — DOCUSATE SODIUM 100 MG: 50 LIQUID ORAL at 08:46

## 2020-01-01 RX ADMIN — DORZOLAMIDE HYDROCHLORIDE 1 DROP: 20 SOLUTION/ DROPS OPHTHALMIC at 22:00

## 2020-01-01 RX ADMIN — HEPARIN SODIUM 5000 UNITS: 10000 INJECTION, SOLUTION INTRAVENOUS; SUBCUTANEOUS at 23:15

## 2020-01-01 RX ADMIN — SEVELAMER CARBONATE 2400 MG: 800 TABLET, FILM COATED ORAL at 09:21

## 2020-01-01 RX ADMIN — DEXTROSE MONOHYDRATE 50 ML: 25 INJECTION, SOLUTION INTRAVENOUS at 04:21

## 2020-01-01 RX ADMIN — MINERAL SUPPLEMENT IRON 300 MG / 5 ML STRENGTH LIQUID 100 PER BOX UNFLAVORED 300 MG: at 08:46

## 2020-01-01 RX ADMIN — SEVELAMER CARBONATE 2400 MG: 800 TABLET, FILM COATED ORAL at 09:04

## 2020-01-01 RX ADMIN — DOPAMINE HYDROCHLORIDE IN DEXTROSE 10 MCG/KG/MIN: 1.6 INJECTION, SOLUTION INTRAVENOUS at 04:40

## 2020-01-01 RX ADMIN — CEFEPIME 2 G: 2 INJECTION, POWDER, FOR SOLUTION INTRAVENOUS at 09:54

## 2020-01-01 RX ADMIN — CEFEPIME 2 G: 2 INJECTION, POWDER, FOR SOLUTION INTRAVENOUS at 08:08

## 2020-01-01 RX ADMIN — DOCUSATE SODIUM 100 MG: 100 CAPSULE, LIQUID FILLED ORAL at 09:06

## 2020-01-01 RX ADMIN — IPRATROPIUM BROMIDE AND ALBUTEROL SULFATE 3 ML: .5; 3 SOLUTION RESPIRATORY (INHALATION) at 08:09

## 2020-01-01 RX ADMIN — DORZOLAMIDE HYDROCHLORIDE 1 DROP: 20 SOLUTION/ DROPS OPHTHALMIC at 08:47

## 2020-01-01 RX ADMIN — MIRTAZAPINE 15 MG: 15 TABLET, FILM COATED ORAL at 21:07

## 2020-01-01 RX ADMIN — FAMOTIDINE 20 MG: 10 INJECTION INTRAVENOUS at 09:07

## 2020-01-01 RX ADMIN — IPRATROPIUM BROMIDE AND ALBUTEROL SULFATE 3 ML: .5; 3 SOLUTION RESPIRATORY (INHALATION) at 12:49

## 2020-01-01 RX ADMIN — HYDRALAZINE HYDROCHLORIDE 25 MG: 25 TABLET, FILM COATED ORAL at 10:39

## 2020-01-01 RX ADMIN — POLYETHYLENE GLYCOL 3350 17 G: 17 POWDER, FOR SOLUTION ORAL at 08:48

## 2020-01-01 RX ADMIN — IPRATROPIUM BROMIDE AND ALBUTEROL SULFATE 3 ML: .5; 3 SOLUTION RESPIRATORY (INHALATION) at 00:51

## 2020-01-01 RX ADMIN — BRIMONIDINE TARTRATE 1 DROP: 2 SOLUTION OPHTHALMIC at 08:48

## 2020-01-01 RX ADMIN — Medication 10 ML: at 05:03

## 2020-01-01 RX ADMIN — DOCUSATE SODIUM 100 MG: 100 CAPSULE, LIQUID FILLED ORAL at 18:01

## 2020-01-01 RX ADMIN — DORZOLAMIDE HYDROCHLORIDE 1 DROP: 20 SOLUTION/ DROPS OPHTHALMIC at 09:10

## 2020-01-01 RX ADMIN — SODIUM BICARBONATE 1300 MG: 650 TABLET ORAL at 10:38

## 2020-01-01 RX ADMIN — IPRATROPIUM BROMIDE AND ALBUTEROL SULFATE 3 ML: .5; 3 SOLUTION RESPIRATORY (INHALATION) at 13:42

## 2020-01-01 RX ADMIN — Medication 10 ML: at 13:33

## 2020-01-01 RX ADMIN — Medication 10 ML: at 21:28

## 2020-01-01 RX ADMIN — FENTANYL CITRATE 50 MCG: 50 INJECTION, SOLUTION INTRAMUSCULAR; INTRAVENOUS at 02:37

## 2020-01-01 RX ADMIN — DORZOLAMIDE HYDROCHLORIDE 1 DROP: 20 SOLUTION/ DROPS OPHTHALMIC at 18:43

## 2020-01-01 RX ADMIN — BRIMONIDINE TARTRATE 1 DROP: 2 SOLUTION OPHTHALMIC at 17:22

## 2020-01-01 RX ADMIN — IPRATROPIUM BROMIDE AND ALBUTEROL SULFATE 3 ML: .5; 3 SOLUTION RESPIRATORY (INHALATION) at 08:23

## 2020-01-01 RX ADMIN — DORZOLAMIDE HYDROCHLORIDE 1 DROP: 20 SOLUTION/ DROPS OPHTHALMIC at 09:23

## 2020-01-01 RX ADMIN — Medication 10 ML: at 21:38

## 2020-01-01 RX ADMIN — MIRTAZAPINE 15 MG: 15 TABLET, FILM COATED ORAL at 21:44

## 2020-01-01 RX ADMIN — MIRTAZAPINE 15 MG: 15 TABLET, FILM COATED ORAL at 21:21

## 2020-01-01 RX ADMIN — DOCUSATE SODIUM 100 MG: 50 LIQUID ORAL at 09:20

## 2020-01-01 RX ADMIN — PANTOPRAZOLE SODIUM 40 MG: 40 TABLET, DELAYED RELEASE ORAL at 08:58

## 2020-01-01 RX ADMIN — DORZOLAMIDE HYDROCHLORIDE 1 DROP: 20 SOLUTION/ DROPS OPHTHALMIC at 22:29

## 2020-01-01 RX ADMIN — DORZOLAMIDE HYDROCHLORIDE 1 DROP: 20 SOLUTION/ DROPS OPHTHALMIC at 22:39

## 2020-01-01 RX ADMIN — SODIUM CHLORIDE 2 MCG/MIN: 9 INJECTION, SOLUTION INTRAVENOUS at 01:09

## 2020-01-01 RX ADMIN — FERROUS SULFATE TAB 325 MG (65 MG ELEMENTAL FE) 325 MG: 325 (65 FE) TAB at 18:01

## 2020-01-01 RX ADMIN — PANTOPRAZOLE SODIUM 40 MG: 40 TABLET, DELAYED RELEASE ORAL at 10:38

## 2020-01-01 RX ADMIN — MIRTAZAPINE 15 MG: 15 TABLET, FILM COATED ORAL at 21:27

## 2020-01-01 RX ADMIN — MIRTAZAPINE 15 MG: 15 TABLET, FILM COATED ORAL at 21:43

## 2020-01-01 RX ADMIN — Medication 5 MCG/MIN: at 05:26

## 2020-01-01 RX ADMIN — HEPARIN SODIUM 5000 UNITS: 10000 INJECTION, SOLUTION INTRAVENOUS; SUBCUTANEOUS at 07:48

## 2020-01-01 RX ADMIN — Medication 10 ML: at 05:33

## 2020-01-01 RX ADMIN — IPRATROPIUM BROMIDE AND ALBUTEROL SULFATE 3 ML: .5; 3 SOLUTION RESPIRATORY (INHALATION) at 00:47

## 2020-01-01 RX ADMIN — CEFEPIME 2 G: 2 INJECTION, POWDER, FOR SOLUTION INTRAVENOUS at 08:31

## 2020-01-01 RX ADMIN — DORZOLAMIDE HYDROCHLORIDE 1 DROP: 20 SOLUTION/ DROPS OPHTHALMIC at 21:59

## 2020-01-01 RX ADMIN — POLYETHYLENE GLYCOL 3350 17 G: 17 POWDER, FOR SOLUTION ORAL at 09:04

## 2020-01-01 RX ADMIN — MINERAL SUPPLEMENT IRON 300 MG / 5 ML STRENGTH LIQUID 100 PER BOX UNFLAVORED 300 MG: at 16:40

## 2020-01-01 RX ADMIN — Medication 10 ML: at 06:48

## 2020-01-01 RX ADMIN — DORZOLAMIDE HYDROCHLORIDE 1 DROP: 20 SOLUTION/ DROPS OPHTHALMIC at 17:00

## 2020-01-01 RX ADMIN — HEPARIN SODIUM 5000 UNITS: 5000 INJECTION INTRAVENOUS; SUBCUTANEOUS at 05:25

## 2020-01-01 RX ADMIN — HEPARIN SODIUM 5000 UNITS: 5000 INJECTION INTRAVENOUS; SUBCUTANEOUS at 17:59

## 2020-01-01 RX ADMIN — BRIMONIDINE TARTRATE 1 DROP: 2 SOLUTION OPHTHALMIC at 15:24

## 2020-01-01 RX ADMIN — DOPAMINE HYDROCHLORIDE IN DEXTROSE 20 MCG/KG/MIN: 1.6 INJECTION, SOLUTION INTRAVENOUS at 08:18

## 2020-01-01 RX ADMIN — DORZOLAMIDE HYDROCHLORIDE 1 DROP: 20 SOLUTION/ DROPS OPHTHALMIC at 21:45

## 2020-01-01 RX ADMIN — DORZOLAMIDE HYDROCHLORIDE 1 DROP: 20 SOLUTION/ DROPS OPHTHALMIC at 15:30

## 2020-01-01 RX ADMIN — HYDRALAZINE HYDROCHLORIDE 25 MG: 25 TABLET, FILM COATED ORAL at 21:08

## 2020-01-01 RX ADMIN — BRIMONIDINE TARTRATE 1 DROP: 2 SOLUTION OPHTHALMIC at 21:08

## 2020-01-01 RX ADMIN — IPRATROPIUM BROMIDE AND ALBUTEROL SULFATE 3 ML: .5; 3 SOLUTION RESPIRATORY (INHALATION) at 08:18

## 2020-01-01 RX ADMIN — SODIUM BICARBONATE 650 MG: 650 TABLET ORAL at 18:00

## 2020-01-01 RX ADMIN — DOPAMINE HYDROCHLORIDE IN DEXTROSE 10 MCG/KG/MIN: 1.6 INJECTION, SOLUTION INTRAVENOUS at 20:00

## 2020-01-01 RX ADMIN — DOCUSATE SODIUM 100 MG: 50 LIQUID ORAL at 23:15

## 2020-01-01 RX ADMIN — SEVELAMER CARBONATE 2.4 G: 2.4 POWDER, FOR SUSPENSION ORAL at 11:48

## 2020-01-01 RX ADMIN — MIRTAZAPINE 15 MG: 15 TABLET, FILM COATED ORAL at 22:27

## 2020-01-01 RX ADMIN — DOCUSATE SODIUM 100 MG: 50 LIQUID ORAL at 08:01

## 2020-01-01 RX ADMIN — PANTOPRAZOLE SODIUM 40 MG: 40 TABLET, DELAYED RELEASE ORAL at 06:52

## 2020-01-01 RX ADMIN — FERROUS SULFATE TAB 325 MG (65 MG ELEMENTAL FE) 325 MG: 325 (65 FE) TAB at 09:21

## 2020-01-01 RX ADMIN — METRONIDAZOLE 500 MG: 250 TABLET ORAL at 14:10

## 2020-01-01 RX ADMIN — DORZOLAMIDE HYDROCHLORIDE 1 DROP: 20 SOLUTION/ DROPS OPHTHALMIC at 21:22

## 2020-01-01 RX ADMIN — DOPAMINE HYDROCHLORIDE IN DEXTROSE 15 MCG/KG/MIN: 1.6 INJECTION, SOLUTION INTRAVENOUS at 00:36

## 2020-01-01 RX ADMIN — Medication 10 ML: at 05:48

## 2020-01-01 RX ADMIN — SEVELAMER CARBONATE 2.4 G: 2.4 POWDER, FOR SUSPENSION ORAL at 16:41

## 2020-01-01 RX ADMIN — Medication 4 MCG/MIN: at 03:48

## 2020-01-01 RX ADMIN — Medication 7.5 MCG/MIN: at 14:04

## 2020-01-01 RX ADMIN — DOCUSATE SODIUM 100 MG: 100 CAPSULE, LIQUID FILLED ORAL at 22:33

## 2020-01-01 RX ADMIN — SEVELAMER CARBONATE 2400 MG: 800 TABLET, FILM COATED ORAL at 17:28

## 2020-01-01 RX ADMIN — DORZOLAMIDE HYDROCHLORIDE 1 DROP: 20 SOLUTION/ DROPS OPHTHALMIC at 09:01

## 2020-01-01 RX ADMIN — IPRATROPIUM BROMIDE AND ALBUTEROL SULFATE 3 ML: .5; 3 SOLUTION RESPIRATORY (INHALATION) at 20:06

## 2020-01-01 RX ADMIN — SODIUM CHLORIDE, POTASSIUM CHLORIDE, SODIUM LACTATE AND CALCIUM CHLORIDE 100 ML/HR: 600; 310; 30; 20 INJECTION, SOLUTION INTRAVENOUS at 09:13

## 2020-01-01 RX ADMIN — ATORVASTATIN CALCIUM 40 MG: 40 TABLET, FILM COATED ORAL at 21:44

## 2020-01-01 RX ADMIN — DOCUSATE SODIUM 100 MG: 100 CAPSULE, LIQUID FILLED ORAL at 21:07

## 2020-01-01 RX ADMIN — Medication 10 ML: at 22:34

## 2020-01-01 RX ADMIN — IPRATROPIUM BROMIDE AND ALBUTEROL SULFATE 3 ML: .5; 3 SOLUTION RESPIRATORY (INHALATION) at 12:58

## 2020-01-01 RX ADMIN — IPRATROPIUM BROMIDE AND ALBUTEROL SULFATE 3 ML: .5; 3 SOLUTION RESPIRATORY (INHALATION) at 07:37

## 2020-01-01 RX ADMIN — PROPOFOL 5 MCG/KG/MIN: 10 INJECTION, EMULSION INTRAVENOUS at 12:48

## 2020-01-01 RX ADMIN — ATORVASTATIN CALCIUM 40 MG: 40 TABLET, FILM COATED ORAL at 22:28

## 2020-01-01 RX ADMIN — BRIMONIDINE TARTRATE 1 DROP: 2 SOLUTION OPHTHALMIC at 09:02

## 2020-01-01 RX ADMIN — LEVOFLOXACIN 750 MG: 5 INJECTION, SOLUTION INTRAVENOUS at 10:40

## 2020-01-01 RX ADMIN — FERROUS SULFATE TAB 325 MG (65 MG ELEMENTAL FE) 325 MG: 325 (65 FE) TAB at 10:39

## 2020-01-01 RX ADMIN — HEPARIN SODIUM 5000 UNITS: 10000 INJECTION, SOLUTION INTRAVENOUS; SUBCUTANEOUS at 00:36

## 2020-01-01 RX ADMIN — INSULIN LISPRO 8 UNITS: 100 INJECTION, SOLUTION INTRAVENOUS; SUBCUTANEOUS at 06:04

## 2020-01-01 RX ADMIN — DORZOLAMIDE HYDROCHLORIDE 1 DROP: 20 SOLUTION/ DROPS OPHTHALMIC at 08:54

## 2020-01-01 RX ADMIN — EPINEPHRINE 1 MG: 0.1 INJECTION, SOLUTION ENDOTRACHEAL; INTRACARDIAC; INTRAVENOUS at 04:22

## 2020-01-01 RX ADMIN — METOCLOPRAMIDE 5 MG: 5 INJECTION, SOLUTION INTRAMUSCULAR; INTRAVENOUS at 23:27

## 2020-01-01 RX ADMIN — SEVELAMER CARBONATE 2.4 G: 2.4 POWDER, FOR SUSPENSION ORAL at 08:46

## 2020-01-01 RX ADMIN — SEVELAMER CARBONATE 1600 MG: 800 TABLET, FILM COATED ORAL at 16:39

## 2020-01-01 RX ADMIN — SODIUM BICARBONATE 650 MG: 650 TABLET ORAL at 09:19

## 2020-01-01 RX ADMIN — EPINEPHRINE 1 MG: 0.1 INJECTION, SOLUTION ENDOTRACHEAL; INTRACARDIAC; INTRAVENOUS at 03:41

## 2020-01-01 RX ADMIN — ATORVASTATIN CALCIUM 40 MG: 40 TABLET, FILM COATED ORAL at 22:33

## 2020-01-01 RX ADMIN — IPRATROPIUM BROMIDE AND ALBUTEROL SULFATE 3 ML: .5; 3 SOLUTION RESPIRATORY (INHALATION) at 20:18

## 2020-01-01 RX ADMIN — Medication 300 UNITS: at 16:40

## 2020-01-01 RX ADMIN — DOPAMINE HYDROCHLORIDE IN DEXTROSE: 1.6 INJECTION, SOLUTION INTRAVENOUS at 05:01

## 2020-01-01 RX ADMIN — Medication 10 ML: at 22:33

## 2020-01-01 RX ADMIN — Medication 10 ML: at 13:45

## 2020-01-01 RX ADMIN — IPRATROPIUM BROMIDE AND ALBUTEROL SULFATE 3 ML: .5; 3 SOLUTION RESPIRATORY (INHALATION) at 07:36

## 2020-01-01 RX ADMIN — DOPAMINE HYDROCHLORIDE IN DEXTROSE 10 MCG/KG/MIN: 1.6 INJECTION, SOLUTION INTRAVENOUS at 17:21

## 2020-01-01 RX ADMIN — MIRTAZAPINE 15 MG: 15 TABLET, FILM COATED ORAL at 21:08

## 2020-01-01 RX ADMIN — HEPARIN SODIUM 10000 UNITS: 1000 INJECTION, SOLUTION INTRAVENOUS; SUBCUTANEOUS at 14:01

## 2020-01-01 RX ADMIN — VANCOMYCIN HYDROCHLORIDE 1000 MG: 1 INJECTION, POWDER, LYOPHILIZED, FOR SOLUTION INTRAVENOUS at 16:37

## 2020-01-01 RX ADMIN — CEFEPIME 2 G: 2 INJECTION, POWDER, FOR SOLUTION INTRAVENOUS at 09:23

## 2020-01-01 RX ADMIN — DORZOLAMIDE HYDROCHLORIDE 1 DROP: 20 SOLUTION/ DROPS OPHTHALMIC at 22:28

## 2020-01-01 RX ADMIN — POTASSIUM CHLORIDE 20 MEQ: 7.46 INJECTION, SOLUTION INTRAVENOUS at 09:05

## 2020-01-01 RX ADMIN — DORZOLAMIDE HYDROCHLORIDE 1 DROP: 20 SOLUTION/ DROPS OPHTHALMIC at 22:34

## 2020-01-01 RX ADMIN — CALCIUM CHLORIDE, MAGNESIUM CHLORIDE, DEXTROSE MONOHYDRATE, LACTIC ACID, SODIUM CHLORIDE, SODIUM BICARBONATE AND POTASSIUM CHLORIDE 1000 ML/HR: 5.15; 2.03; 22; 5.4; 6.46; 3.09; .157 INJECTION INTRAVENOUS at 03:09

## 2020-01-01 RX ADMIN — IPRATROPIUM BROMIDE AND ALBUTEROL SULFATE 3 ML: .5; 3 SOLUTION RESPIRATORY (INHALATION) at 13:55

## 2020-01-01 RX ADMIN — IPRATROPIUM BROMIDE AND ALBUTEROL SULFATE 3 ML: .5; 3 SOLUTION RESPIRATORY (INHALATION) at 13:36

## 2020-01-01 RX ADMIN — SEVELAMER CARBONATE 2.4 G: 2.4 POWDER, FOR SUSPENSION ORAL at 17:21

## 2020-01-01 RX ADMIN — CARVEDILOL 25 MG: 12.5 TABLET, FILM COATED ORAL at 09:04

## 2020-01-01 RX ADMIN — SEVELAMER CARBONATE 1600 MG: 800 TABLET, FILM COATED ORAL at 20:04

## 2020-01-01 RX ADMIN — BRIMONIDINE TARTRATE 1 DROP: 2 SOLUTION OPHTHALMIC at 17:00

## 2020-01-01 RX ADMIN — IPRATROPIUM BROMIDE AND ALBUTEROL SULFATE 3 ML: .5; 3 SOLUTION RESPIRATORY (INHALATION) at 01:59

## 2020-01-01 RX ADMIN — PROPOFOL 20 MCG/KG/MIN: 10 INJECTION, EMULSION INTRAVENOUS at 03:17

## 2020-01-01 RX ADMIN — HEPARIN SODIUM 5000 UNITS: 10000 INJECTION, SOLUTION INTRAVENOUS; SUBCUTANEOUS at 14:27

## 2020-01-01 RX ADMIN — IPRATROPIUM BROMIDE AND ALBUTEROL SULFATE 3 ML: .5; 3 SOLUTION RESPIRATORY (INHALATION) at 07:23

## 2020-01-01 RX ADMIN — ATORVASTATIN CALCIUM 40 MG: 40 TABLET, FILM COATED ORAL at 21:27

## 2020-01-01 RX ADMIN — DORZOLAMIDE HYDROCHLORIDE 1 DROP: 20 SOLUTION/ DROPS OPHTHALMIC at 23:17

## 2020-01-01 RX ADMIN — CALCIUM CHLORIDE, MAGNESIUM CHLORIDE, DEXTROSE MONOHYDRATE, LACTIC ACID, SODIUM CHLORIDE, SODIUM BICARBONATE AND POTASSIUM CHLORIDE 1000 ML/HR: 5.15; 2.03; 22; 5.4; 6.46; 3.09; .157 INJECTION INTRAVENOUS at 17:10

## 2020-01-01 RX ADMIN — BRIMONIDINE TARTRATE 1 DROP: 2 SOLUTION OPHTHALMIC at 21:44

## 2020-01-01 RX ADMIN — Medication 10 ML: at 21:29

## 2020-01-01 RX ADMIN — HEPARIN SODIUM 5000 UNITS: 10000 INJECTION, SOLUTION INTRAVENOUS; SUBCUTANEOUS at 22:28

## 2020-01-01 RX ADMIN — FUROSEMIDE 40 MG: 10 INJECTION, SOLUTION INTRAVENOUS at 07:22

## 2020-01-01 RX ADMIN — BRIMONIDINE TARTRATE 1 DROP: 2 SOLUTION OPHTHALMIC at 09:10

## 2020-01-01 RX ADMIN — HEPARIN SODIUM 5000 UNITS: 10000 INJECTION, SOLUTION INTRAVENOUS; SUBCUTANEOUS at 15:04

## 2020-01-01 RX ADMIN — SODIUM BICARBONATE 1300 MG: 650 TABLET ORAL at 09:07

## 2020-01-01 RX ADMIN — MORPHINE SULFATE 2 MG: 2 INJECTION, SOLUTION INTRAMUSCULAR; INTRAVENOUS at 08:11

## 2020-01-01 RX ADMIN — IPRATROPIUM BROMIDE AND ALBUTEROL SULFATE 3 ML: .5; 3 SOLUTION RESPIRATORY (INHALATION) at 02:25

## 2020-01-01 RX ADMIN — IPRATROPIUM BROMIDE AND ALBUTEROL SULFATE 3 ML: .5; 3 SOLUTION RESPIRATORY (INHALATION) at 20:42

## 2020-01-01 RX ADMIN — IPRATROPIUM BROMIDE AND ALBUTEROL SULFATE 3 ML: .5; 3 SOLUTION RESPIRATORY (INHALATION) at 19:44

## 2020-01-01 RX ADMIN — SUCRALFATE 1 G: 1 TABLET ORAL at 21:26

## 2020-01-01 RX ADMIN — Medication 10 ML: at 15:05

## 2020-01-01 RX ADMIN — ONDANSETRON 4 MG: 2 INJECTION INTRAMUSCULAR; INTRAVENOUS at 02:36

## 2020-01-01 RX ADMIN — POLYETHYLENE GLYCOL 3350 17 G: 17 POWDER, FOR SOLUTION ORAL at 08:46

## 2020-01-01 RX ADMIN — DOPAMINE HYDROCHLORIDE IN DEXTROSE 15 MCG/KG/MIN: 1.6 INJECTION, SOLUTION INTRAVENOUS at 08:50

## 2020-01-01 RX ADMIN — IPRATROPIUM BROMIDE AND ALBUTEROL SULFATE 3 ML: .5; 3 SOLUTION RESPIRATORY (INHALATION) at 08:16

## 2020-01-01 RX ADMIN — HEPARIN SODIUM 5000 UNITS: 10000 INJECTION, SOLUTION INTRAVENOUS; SUBCUTANEOUS at 23:00

## 2020-01-01 RX ADMIN — SODIUM BICARBONATE 50 MEQ: 84 INJECTION INTRAVENOUS at 04:15

## 2020-01-01 RX ADMIN — PROPOFOL 35 MCG/KG/MIN: 10 INJECTION, EMULSION INTRAVENOUS at 07:35

## 2020-01-01 RX ADMIN — PROPOFOL 20 MCG/KG/MIN: 10 INJECTION, EMULSION INTRAVENOUS at 11:56

## 2020-01-01 RX ADMIN — METOCLOPRAMIDE 5 MG: 5 INJECTION, SOLUTION INTRAMUSCULAR; INTRAVENOUS at 00:45

## 2020-01-01 RX ADMIN — SEVELAMER CARBONATE 1600 MG: 800 TABLET, FILM COATED ORAL at 09:07

## 2020-01-01 RX ADMIN — DORZOLAMIDE HYDROCHLORIDE 1 DROP: 20 SOLUTION/ DROPS OPHTHALMIC at 09:05

## 2020-01-01 RX ADMIN — IPRATROPIUM BROMIDE AND ALBUTEROL SULFATE 3 ML: .5; 3 SOLUTION RESPIRATORY (INHALATION) at 00:49

## 2020-01-01 RX ADMIN — DOPAMINE HYDROCHLORIDE IN DEXTROSE 19 MCG/KG/MIN: 1.6 INJECTION, SOLUTION INTRAVENOUS at 19:55

## 2020-01-01 RX ADMIN — SEVELAMER CARBONATE 2.4 G: 2.4 POWDER, FOR SUSPENSION ORAL at 08:09

## 2020-01-01 RX ADMIN — BRIMONIDINE TARTRATE 1 DROP: 2 SOLUTION OPHTHALMIC at 16:39

## 2020-01-01 RX ADMIN — CEFEPIME 2 G: 2 INJECTION, POWDER, FOR SOLUTION INTRAVENOUS at 09:03

## 2020-01-01 RX ADMIN — DOCUSATE SODIUM 100 MG: 50 LIQUID ORAL at 21:44

## 2020-01-01 RX ADMIN — INSULIN LISPRO 4 UNITS: 100 INJECTION, SOLUTION INTRAVENOUS; SUBCUTANEOUS at 23:27

## 2020-01-01 RX ADMIN — DORZOLAMIDE HYDROCHLORIDE 1 DROP: 20 SOLUTION/ DROPS OPHTHALMIC at 17:22

## 2020-01-01 RX ADMIN — CEFEPIME 2 G: 2 INJECTION, POWDER, FOR SOLUTION INTRAVENOUS at 08:00

## 2020-01-01 RX ADMIN — FERROUS SULFATE TAB 325 MG (65 MG ELEMENTAL FE) 325 MG: 325 (65 FE) TAB at 17:54

## 2020-01-01 RX ADMIN — BRIMONIDINE TARTRATE 1 DROP: 2 SOLUTION OPHTHALMIC at 22:00

## 2020-01-01 RX ADMIN — DOPAMINE HYDROCHLORIDE IN DEXTROSE 5 MCG/KG/MIN: 1.6 INJECTION, SOLUTION INTRAVENOUS at 07:06

## 2020-01-01 RX ADMIN — DORZOLAMIDE HYDROCHLORIDE 1 DROP: 20 SOLUTION/ DROPS OPHTHALMIC at 08:20

## 2020-01-01 RX ADMIN — MINERAL SUPPLEMENT IRON 300 MG / 5 ML STRENGTH LIQUID 100 PER BOX UNFLAVORED 300 MG: at 16:37

## 2020-01-01 RX ADMIN — HEPARIN SODIUM 5000 UNITS: 10000 INJECTION, SOLUTION INTRAVENOUS; SUBCUTANEOUS at 18:04

## 2020-01-01 RX ADMIN — PROPOFOL 10 MCG/KG/MIN: 10 INJECTION, EMULSION INTRAVENOUS at 00:40

## 2020-01-01 RX ADMIN — DOPAMINE HYDROCHLORIDE IN DEXTROSE 10 MCG/KG/MIN: 1.6 INJECTION, SOLUTION INTRAVENOUS at 17:01

## 2020-01-01 RX ADMIN — DORZOLAMIDE HYDROCHLORIDE 1 DROP: 20 SOLUTION/ DROPS OPHTHALMIC at 15:38

## 2020-01-01 RX ADMIN — MIRTAZAPINE 15 MG: 15 TABLET, FILM COATED ORAL at 22:28

## 2020-01-01 RX ADMIN — DOPAMINE HYDROCHLORIDE IN DEXTROSE 20 MCG/KG/MIN: 1.6 INJECTION, SOLUTION INTRAVENOUS at 14:03

## 2020-01-01 RX ADMIN — ATORVASTATIN CALCIUM 40 MG: 40 TABLET, FILM COATED ORAL at 21:43

## 2020-01-01 RX ADMIN — CARVEDILOL 25 MG: 12.5 TABLET, FILM COATED ORAL at 16:39

## 2020-01-01 RX ADMIN — SEVELAMER CARBONATE 2400 MG: 800 TABLET, FILM COATED ORAL at 12:49

## 2020-01-01 RX ADMIN — DOPAMINE HYDROCHLORIDE IN DEXTROSE 7.5 MCG/KG/MIN: 1.6 INJECTION, SOLUTION INTRAVENOUS at 08:55

## 2020-01-01 RX ADMIN — DOCUSATE SODIUM 100 MG: 50 LIQUID ORAL at 22:28

## 2020-01-01 RX ADMIN — PROPOFOL 40 MCG/KG/MIN: 10 INJECTION, EMULSION INTRAVENOUS at 02:08

## 2020-01-01 RX ADMIN — SODIUM BICARBONATE 1300 MG: 650 TABLET ORAL at 21:07

## 2020-01-01 RX ADMIN — MIDAZOLAM HYDROCHLORIDE 5 MG: 1 INJECTION, SOLUTION INTRAMUSCULAR; INTRAVENOUS at 20:37

## 2020-01-01 RX ADMIN — BRIMONIDINE TARTRATE 1 DROP: 2 SOLUTION OPHTHALMIC at 22:34

## 2020-01-01 RX ADMIN — BRIMONIDINE TARTRATE 1 DROP: 2 SOLUTION OPHTHALMIC at 15:30

## 2020-01-01 RX ADMIN — PANTOPRAZOLE SODIUM 40 MG: 40 TABLET, DELAYED RELEASE ORAL at 09:16

## 2020-01-01 RX ADMIN — CEFEPIME 2 G: 2 INJECTION, POWDER, FOR SOLUTION INTRAVENOUS at 08:54

## 2020-01-01 RX ADMIN — SEVELAMER CARBONATE 2400 MG: 800 TABLET, FILM COATED ORAL at 11:56

## 2020-01-01 RX ADMIN — SEVELAMER CARBONATE 2400 MG: 800 TABLET, FILM COATED ORAL at 09:10

## 2020-01-01 RX ADMIN — OXYCODONE HYDROCHLORIDE 5 MG: 5 TABLET ORAL at 03:06

## 2020-01-01 RX ADMIN — HEPARIN SODIUM 5000 UNITS: 10000 INJECTION, SOLUTION INTRAVENOUS; SUBCUTANEOUS at 22:33

## 2020-01-01 RX ADMIN — BRIMONIDINE TARTRATE 1 DROP: 2 SOLUTION OPHTHALMIC at 08:54

## 2020-01-01 RX ADMIN — MIRTAZAPINE 15 MG: 15 TABLET, FILM COATED ORAL at 21:37

## 2020-01-01 RX ADMIN — DOPAMINE HYDROCHLORIDE IN DEXTROSE 15 MCG/KG/MIN: 1.6 INJECTION, SOLUTION INTRAVENOUS at 07:27

## 2020-01-01 RX ADMIN — DOCUSATE SODIUM 100 MG: 100 CAPSULE, LIQUID FILLED ORAL at 09:04

## 2020-01-01 RX ADMIN — METRONIDAZOLE 500 MG: 500 INJECTION, SOLUTION INTRAVENOUS at 16:38

## 2020-01-01 RX ADMIN — SODIUM BICARBONATE 650 MG: 650 TABLET ORAL at 22:27

## 2020-01-01 RX ADMIN — ATORVASTATIN CALCIUM 40 MG: 40 TABLET, FILM COATED ORAL at 22:27

## 2020-01-01 RX ADMIN — BRIMONIDINE TARTRATE 1 DROP: 2 SOLUTION OPHTHALMIC at 22:28

## 2020-01-01 RX ADMIN — IPRATROPIUM BROMIDE AND ALBUTEROL SULFATE 3 ML: .5; 3 SOLUTION RESPIRATORY (INHALATION) at 14:19

## 2020-01-01 RX ADMIN — DORZOLAMIDE HYDROCHLORIDE 1 DROP: 20 SOLUTION/ DROPS OPHTHALMIC at 16:39

## 2020-01-01 RX ADMIN — Medication 10 ML: at 06:05

## 2020-01-01 RX ADMIN — SODIUM CHLORIDE 1000 ML: 9 INJECTION, SOLUTION INTRAVENOUS at 15:00

## 2020-01-01 RX ADMIN — ASPIRIN 81 MG 324 MG: 81 TABLET ORAL at 07:20

## 2020-01-01 RX ADMIN — SEVELAMER CARBONATE 2400 MG: 800 TABLET, FILM COATED ORAL at 11:59

## 2020-01-01 RX ADMIN — CALCIUM CHLORIDE 1 G: 100 INJECTION, SOLUTION INTRAVENOUS at 04:14

## 2020-01-01 RX ADMIN — METOCLOPRAMIDE 5 MG: 5 INJECTION, SOLUTION INTRAMUSCULAR; INTRAVENOUS at 17:21

## 2020-01-01 RX ADMIN — SEVELAMER CARBONATE 2.4 G: 2.4 POWDER, FOR SUSPENSION ORAL at 11:30

## 2020-01-01 RX ADMIN — DOCUSATE SODIUM 100 MG: 50 LIQUID ORAL at 21:43

## 2020-01-01 RX ADMIN — FERROUS SULFATE TAB 325 MG (65 MG ELEMENTAL FE) 325 MG: 325 (65 FE) TAB at 09:05

## 2020-01-01 RX ADMIN — HYDRALAZINE HYDROCHLORIDE 25 MG: 25 TABLET, FILM COATED ORAL at 21:27

## 2020-01-01 RX ADMIN — ATORVASTATIN CALCIUM 40 MG: 40 TABLET, FILM COATED ORAL at 21:21

## 2020-01-01 RX ADMIN — BRIMONIDINE TARTRATE 1 DROP: 2 SOLUTION OPHTHALMIC at 09:23

## 2020-01-01 RX ADMIN — SEVELAMER CARBONATE 1600 MG: 800 TABLET, FILM COATED ORAL at 10:38

## 2020-01-01 RX ADMIN — FAMOTIDINE 20 MG: 10 INJECTION INTRAVENOUS at 21:27

## 2020-01-01 RX ADMIN — HEPARIN SODIUM 5000 UNITS: 10000 INJECTION, SOLUTION INTRAVENOUS; SUBCUTANEOUS at 07:53

## 2020-01-01 RX ADMIN — SODIUM BICARBONATE 50 MEQ: 84 INJECTION INTRAVENOUS at 04:25

## 2020-01-01 RX ADMIN — HEPARIN SODIUM 5000 UNITS: 10000 INJECTION, SOLUTION INTRAVENOUS; SUBCUTANEOUS at 17:28

## 2020-01-01 RX ADMIN — PROPOFOL 40 MCG/KG/MIN: 10 INJECTION, EMULSION INTRAVENOUS at 15:03

## 2020-01-01 RX ADMIN — METOCLOPRAMIDE 5 MG: 5 INJECTION, SOLUTION INTRAMUSCULAR; INTRAVENOUS at 14:27

## 2020-01-01 RX ADMIN — OXYCODONE HYDROCHLORIDE 10 MG: 10 TABLET, FILM COATED, EXTENDED RELEASE ORAL at 09:08

## 2020-01-01 RX ADMIN — MIRTAZAPINE 15 MG: 15 TABLET, FILM COATED ORAL at 22:38

## 2020-01-01 RX ADMIN — ATORVASTATIN CALCIUM 40 MG: 40 TABLET, FILM COATED ORAL at 23:15

## 2020-01-01 RX ADMIN — DORZOLAMIDE HYDROCHLORIDE 1 DROP: 20 SOLUTION/ DROPS OPHTHALMIC at 15:01

## 2020-01-01 RX ADMIN — DOCUSATE SODIUM 100 MG: 50 LIQUID ORAL at 08:47

## 2020-01-01 RX ADMIN — HEPARIN SODIUM 5000 UNITS: 10000 INJECTION, SOLUTION INTRAVENOUS; SUBCUTANEOUS at 15:30

## 2020-01-01 RX ADMIN — DORZOLAMIDE HYDROCHLORIDE 1 DROP: 20 SOLUTION/ DROPS OPHTHALMIC at 17:59

## 2020-01-01 RX ADMIN — IPRATROPIUM BROMIDE AND ALBUTEROL SULFATE 3 ML: .5; 3 SOLUTION RESPIRATORY (INHALATION) at 20:15

## 2020-01-01 RX ADMIN — DORZOLAMIDE HYDROCHLORIDE 1 DROP: 20 SOLUTION/ DROPS OPHTHALMIC at 15:24

## 2020-01-01 RX ADMIN — DOPAMINE HYDROCHLORIDE IN DEXTROSE 10 MCG/KG/MIN: 1.6 INJECTION, SOLUTION INTRAVENOUS at 03:32

## 2020-01-01 RX ADMIN — DORZOLAMIDE HYDROCHLORIDE 1 DROP: 20 SOLUTION/ DROPS OPHTHALMIC at 21:44

## 2020-01-01 RX ADMIN — DOPAMINE HYDROCHLORIDE IN DEXTROSE 20 MCG/KG/MIN: 1.6 INJECTION, SOLUTION INTRAVENOUS at 02:11

## 2020-01-01 RX ADMIN — IPRATROPIUM BROMIDE AND ALBUTEROL SULFATE 3 ML: .5; 3 SOLUTION RESPIRATORY (INHALATION) at 03:19

## 2020-01-01 RX ADMIN — SEVELAMER CARBONATE 2400 MG: 800 TABLET, FILM COATED ORAL at 08:58

## 2020-01-01 RX ADMIN — ERYTHROPOIETIN 8000 UNITS: 20000 INJECTION, SOLUTION INTRAVENOUS; SUBCUTANEOUS at 14:00

## 2020-01-01 RX ADMIN — METOCLOPRAMIDE 5 MG: 5 INJECTION, SOLUTION INTRAMUSCULAR; INTRAVENOUS at 06:04

## 2020-01-01 RX ADMIN — CEFEPIME 2 G: 2 INJECTION, POWDER, FOR SOLUTION INTRAVENOUS at 08:48

## 2020-01-01 RX ADMIN — IPRATROPIUM BROMIDE AND ALBUTEROL SULFATE 3 ML: .5; 3 SOLUTION RESPIRATORY (INHALATION) at 00:33

## 2020-01-01 RX ADMIN — MIRTAZAPINE 15 MG: 15 TABLET, FILM COATED ORAL at 23:15

## 2020-01-01 RX ADMIN — PROPOFOL 20 MCG/KG/MIN: 10 INJECTION, EMULSION INTRAVENOUS at 15:04

## 2020-01-01 RX ADMIN — PANTOPRAZOLE SODIUM 40 MG: 40 GRANULE, DELAYED RELEASE ORAL at 07:53

## 2020-01-01 RX ADMIN — SEVELAMER CARBONATE 800 MG: 800 TABLET, FILM COATED ORAL at 08:59

## 2020-01-01 RX ADMIN — DOPAMINE HYDROCHLORIDE IN DEXTROSE 20 MCG/KG/MIN: 1.6 INJECTION, SOLUTION INTRAVENOUS at 16:03

## 2020-01-01 RX ADMIN — OXYCODONE HYDROCHLORIDE 10 MG: 10 TABLET, FILM COATED, EXTENDED RELEASE ORAL at 10:39

## 2020-01-01 RX ADMIN — DORZOLAMIDE HYDROCHLORIDE 1 DROP: 20 SOLUTION/ DROPS OPHTHALMIC at 09:02

## 2020-01-01 RX ADMIN — SODIUM BICARBONATE 1300 MG: 650 TABLET ORAL at 21:26

## 2020-01-01 RX ADMIN — HEPARIN SODIUM 5000 UNITS: 10000 INJECTION, SOLUTION INTRAVENOUS; SUBCUTANEOUS at 15:01

## 2020-01-01 RX ADMIN — METRONIDAZOLE 500 MG: 250 TABLET ORAL at 18:00

## 2020-01-01 RX ADMIN — BRIMONIDINE TARTRATE 1 DROP: 2 SOLUTION OPHTHALMIC at 21:45

## 2020-01-01 RX ADMIN — SODIUM CHLORIDE 2 MCG/MIN: 9 INJECTION, SOLUTION INTRAVENOUS at 18:06

## 2020-01-01 RX ADMIN — SODIUM BICARBONATE 1300 MG: 650 TABLET ORAL at 22:12

## 2020-01-01 RX ADMIN — OXYCODONE HYDROCHLORIDE 10 MG: 10 TABLET, FILM COATED, EXTENDED RELEASE ORAL at 22:12

## 2020-01-01 RX ADMIN — METOCLOPRAMIDE 5 MG: 5 INJECTION, SOLUTION INTRAMUSCULAR; INTRAVENOUS at 11:30

## 2020-01-01 RX ADMIN — SEVELAMER CARBONATE 2400 MG: 800 TABLET, FILM COATED ORAL at 17:54

## 2020-01-01 RX ADMIN — HYDRALAZINE HYDROCHLORIDE 25 MG: 25 TABLET, FILM COATED ORAL at 09:07

## 2020-01-01 RX ADMIN — BRIMONIDINE TARTRATE 1 DROP: 2 SOLUTION OPHTHALMIC at 09:41

## 2020-01-01 RX ADMIN — POLYETHYLENE GLYCOL 3350 17 G: 17 POWDER, FOR SOLUTION ORAL at 08:01

## 2020-01-01 RX ADMIN — HEPARIN SODIUM 5000 UNITS: 10000 INJECTION, SOLUTION INTRAVENOUS; SUBCUTANEOUS at 16:37

## 2020-01-01 RX ADMIN — HEPARIN SODIUM 5000 UNITS: 10000 INJECTION, SOLUTION INTRAVENOUS; SUBCUTANEOUS at 06:47

## 2020-01-01 RX ADMIN — DOCUSATE SODIUM 100 MG: 50 LIQUID ORAL at 09:02

## 2020-01-01 RX ADMIN — HEPARIN SODIUM 5000 UNITS: 10000 INJECTION, SOLUTION INTRAVENOUS; SUBCUTANEOUS at 08:14

## 2020-01-01 RX ADMIN — DOCUSATE SODIUM 100 MG: 100 CAPSULE, LIQUID FILLED ORAL at 09:08

## 2020-01-01 RX ADMIN — POLYETHYLENE GLYCOL 3350 17 G: 17 POWDER, FOR SOLUTION ORAL at 09:21

## 2020-01-01 RX ADMIN — Medication 5 ML: at 14:16

## 2020-01-01 RX ADMIN — EPINEPHRINE 1 MG: 0.1 INJECTION, SOLUTION ENDOTRACHEAL; INTRACARDIAC; INTRAVENOUS at 04:09

## 2020-01-01 RX ADMIN — IPRATROPIUM BROMIDE AND ALBUTEROL SULFATE 3 ML: .5; 3 SOLUTION RESPIRATORY (INHALATION) at 21:34

## 2020-01-01 RX ADMIN — TICAGRELOR 180 MG: 90 TABLET ORAL at 07:17

## 2020-01-01 RX ADMIN — IPRATROPIUM BROMIDE AND ALBUTEROL SULFATE 3 ML: .5; 3 SOLUTION RESPIRATORY (INHALATION) at 02:00

## 2020-01-01 RX ADMIN — BRIMONIDINE TARTRATE 1 DROP: 2 SOLUTION OPHTHALMIC at 09:11

## 2020-01-01 RX ADMIN — ATORVASTATIN CALCIUM 40 MG: 40 TABLET, FILM COATED ORAL at 22:38

## 2020-01-01 RX ADMIN — CALCIUM CHLORIDE, MAGNESIUM CHLORIDE, DEXTROSE MONOHYDRATE, LACTIC ACID, SODIUM CHLORIDE, SODIUM BICARBONATE AND POTASSIUM CHLORIDE 1000 ML/HR: 5.15; 2.03; 22; 5.4; 6.46; 3.09; .157 INJECTION INTRAVENOUS at 21:39

## 2020-01-01 RX ADMIN — IPRATROPIUM BROMIDE AND ALBUTEROL SULFATE 3 ML: .5; 3 SOLUTION RESPIRATORY (INHALATION) at 07:44

## 2020-01-01 RX ADMIN — MAGNESIUM SULFATE IN WATER 2 G: 40 INJECTION, SOLUTION INTRAVENOUS at 09:21

## 2020-01-01 RX ADMIN — DORZOLAMIDE HYDROCHLORIDE 1 DROP: 20 SOLUTION/ DROPS OPHTHALMIC at 09:42

## 2020-01-01 RX ADMIN — BRIMONIDINE TARTRATE 1 DROP: 2 SOLUTION OPHTHALMIC at 23:17

## 2020-01-01 RX ADMIN — CARVEDILOL 25 MG: 12.5 TABLET, FILM COATED ORAL at 09:07

## 2020-01-01 RX ADMIN — BRIMONIDINE TARTRATE 1 DROP: 2 SOLUTION OPHTHALMIC at 21:38

## 2020-01-01 RX ADMIN — MIDAZOLAM HYDROCHLORIDE 5 MG: 5 INJECTION, SOLUTION INTRAMUSCULAR; INTRAVENOUS at 17:45

## 2020-01-01 RX ADMIN — NOREPINEPHRINE BITARTRATE: 1 INJECTION INTRAVENOUS at 17:33

## 2020-01-01 RX ADMIN — HEPARIN SODIUM 5000 UNITS: 10000 INJECTION, SOLUTION INTRAVENOUS; SUBCUTANEOUS at 08:46

## 2020-01-01 RX ADMIN — DORZOLAMIDE HYDROCHLORIDE 1 DROP: 20 SOLUTION/ DROPS OPHTHALMIC at 16:40

## 2020-01-01 RX ADMIN — SEVELAMER CARBONATE 1600 MG: 800 TABLET, FILM COATED ORAL at 18:00

## 2020-01-01 RX ADMIN — HEPARIN SODIUM 10000 UNITS: 1000 INJECTION, SOLUTION INTRAVENOUS; SUBCUTANEOUS at 15:07

## 2020-01-01 RX ADMIN — IPRATROPIUM BROMIDE AND ALBUTEROL SULFATE 3 ML: .5; 3 SOLUTION RESPIRATORY (INHALATION) at 13:45

## 2020-01-01 RX ADMIN — SODIUM BICARBONATE 1300 MG: 650 TABLET ORAL at 16:39

## 2020-01-01 RX ADMIN — IPRATROPIUM BROMIDE AND ALBUTEROL SULFATE 3 ML: .5; 3 SOLUTION RESPIRATORY (INHALATION) at 07:30

## 2020-01-01 RX ADMIN — DOPAMINE HYDROCHLORIDE IN DEXTROSE 10 MCG/KG/MIN: 1.6 INJECTION, SOLUTION INTRAVENOUS at 03:40

## 2020-01-01 RX ADMIN — DORZOLAMIDE HYDROCHLORIDE 1 DROP: 20 SOLUTION/ DROPS OPHTHALMIC at 08:01

## 2020-01-01 RX ADMIN — Medication 10 ML: at 14:36

## 2020-01-01 RX ADMIN — BISACODYL 10 MG: 10 SUPPOSITORY RECTAL at 09:33

## 2020-01-01 RX ADMIN — Medication 10 ML: at 16:40

## 2020-01-01 RX ADMIN — DOPAMINE HYDROCHLORIDE IN DEXTROSE 10 MCG/KG/MIN: 1.6 INJECTION, SOLUTION INTRAVENOUS at 16:51

## 2020-01-01 RX ADMIN — MORPHINE SULFATE 2 MG: 2 INJECTION, SOLUTION INTRAMUSCULAR; INTRAVENOUS at 20:04

## 2020-01-01 RX ADMIN — Medication 100 MG: at 17:45

## 2020-01-01 RX ADMIN — HEPARIN SODIUM 5000 UNITS: 10000 INJECTION, SOLUTION INTRAVENOUS; SUBCUTANEOUS at 14:08

## 2020-01-01 RX ADMIN — OXYCODONE HYDROCHLORIDE 10 MG: 10 TABLET, FILM COATED, EXTENDED RELEASE ORAL at 21:27

## 2020-01-01 RX ADMIN — MINERAL SUPPLEMENT IRON 300 MG / 5 ML STRENGTH LIQUID 100 PER BOX UNFLAVORED 300 MG: at 09:02

## 2020-01-01 RX ADMIN — ATORVASTATIN CALCIUM 40 MG: 40 TABLET, FILM COATED ORAL at 21:07

## 2020-01-01 RX ADMIN — DOCUSATE SODIUM 100 MG: 50 LIQUID ORAL at 08:58

## 2020-01-01 RX ADMIN — HEPARIN SODIUM 5000 UNITS: 10000 INJECTION, SOLUTION INTRAVENOUS; SUBCUTANEOUS at 16:43

## 2020-01-01 RX ADMIN — SEVELAMER CARBONATE 2400 MG: 800 TABLET, FILM COATED ORAL at 08:48

## 2020-01-01 RX ADMIN — SEVELAMER CARBONATE 2400 MG: 800 TABLET, FILM COATED ORAL at 16:06

## 2020-01-01 RX ADMIN — DOCUSATE SODIUM 100 MG: 50 LIQUID ORAL at 21:21

## 2020-01-01 RX ADMIN — INSULIN LISPRO 4 UNITS: 100 INJECTION, SOLUTION INTRAVENOUS; SUBCUTANEOUS at 11:30

## 2020-01-01 RX ADMIN — IPRATROPIUM BROMIDE AND ALBUTEROL SULFATE 3 ML: .5; 3 SOLUTION RESPIRATORY (INHALATION) at 22:10

## 2020-01-01 RX ADMIN — PANTOPRAZOLE SODIUM 40 MG: 40 GRANULE, DELAYED RELEASE ORAL at 08:45

## 2020-01-01 RX ADMIN — PROPOFOL 40 MCG/KG/MIN: 10 INJECTION, EMULSION INTRAVENOUS at 21:35

## 2020-01-01 RX ADMIN — BRIMONIDINE TARTRATE 1 DROP: 2 SOLUTION OPHTHALMIC at 09:01

## 2020-01-01 RX ADMIN — OXYCODONE HYDROCHLORIDE 10 MG: 10 TABLET, FILM COATED, EXTENDED RELEASE ORAL at 21:08

## 2020-01-01 RX ADMIN — DOCUSATE SODIUM 100 MG: 100 CAPSULE, LIQUID FILLED ORAL at 10:39

## 2020-01-01 RX ADMIN — ERYTHROPOIETIN 8000 UNITS: 20000 INJECTION, SOLUTION INTRAVENOUS; SUBCUTANEOUS at 15:06

## 2020-01-01 RX ADMIN — SEVELAMER CARBONATE 800 MG: 800 TABLET, FILM COATED ORAL at 09:08

## 2020-01-01 RX ADMIN — CARVEDILOL 25 MG: 12.5 TABLET, FILM COATED ORAL at 10:38

## 2020-01-01 RX ADMIN — VANCOMYCIN HYDROCHLORIDE 500 MG: 500 INJECTION, POWDER, LYOPHILIZED, FOR SOLUTION INTRAVENOUS at 11:46

## 2020-01-01 RX ADMIN — FERROUS SULFATE TAB 325 MG (65 MG ELEMENTAL FE) 325 MG: 325 (65 FE) TAB at 09:08

## 2020-01-01 RX ADMIN — PANTOPRAZOLE SODIUM 40 MG: 40 TABLET, DELAYED RELEASE ORAL at 08:14

## 2020-01-01 RX ADMIN — VANCOMYCIN HYDROCHLORIDE 1000 MG: 1 INJECTION, POWDER, LYOPHILIZED, FOR SOLUTION INTRAVENOUS at 17:52

## 2020-01-01 RX ADMIN — METRONIDAZOLE 500 MG: 500 INJECTION, SOLUTION INTRAVENOUS at 10:39

## 2020-01-01 RX ADMIN — DOPAMINE HYDROCHLORIDE IN DEXTROSE: 1.6 INJECTION, SOLUTION INTRAVENOUS at 00:46

## 2020-01-01 RX ADMIN — IPRATROPIUM BROMIDE AND ALBUTEROL SULFATE 3 ML: .5; 3 SOLUTION RESPIRATORY (INHALATION) at 15:43

## 2020-01-01 RX ADMIN — FERROUS SULFATE TAB 325 MG (65 MG ELEMENTAL FE) 325 MG: 325 (65 FE) TAB at 07:48

## 2020-01-01 RX ADMIN — FERROUS SULFATE TAB 325 MG (65 MG ELEMENTAL FE) 325 MG: 325 (65 FE) TAB at 09:07

## 2020-01-01 RX ADMIN — DORZOLAMIDE HYDROCHLORIDE 1 DROP: 20 SOLUTION/ DROPS OPHTHALMIC at 21:38

## 2020-01-01 RX ADMIN — CALCIUM CHLORIDE, MAGNESIUM CHLORIDE, DEXTROSE MONOHYDRATE, LACTIC ACID, SODIUM CHLORIDE, SODIUM BICARBONATE AND POTASSIUM CHLORIDE 800 ML/HR: 3.68; 3.05; 22; 5.4; 6.46; 3.09; .314 INJECTION INTRAVENOUS at 16:08

## 2020-01-01 RX ADMIN — BRIMONIDINE TARTRATE 1 DROP: 2 SOLUTION OPHTHALMIC at 08:02

## 2020-01-01 RX ADMIN — PANTOPRAZOLE SODIUM 40 MG: 40 TABLET, DELAYED RELEASE ORAL at 09:21

## 2020-01-01 RX ADMIN — CARVEDILOL 25 MG: 12.5 TABLET, FILM COATED ORAL at 20:03

## 2020-01-01 RX ADMIN — BRIMONIDINE TARTRATE 1 DROP: 2 SOLUTION OPHTHALMIC at 08:20

## 2020-01-01 RX ADMIN — PROPOFOL 35 MCG/KG/MIN: 10 INJECTION, EMULSION INTRAVENOUS at 18:31

## 2020-01-01 RX ADMIN — DOPAMINE HYDROCHLORIDE IN DEXTROSE 5 MCG/KG/MIN: 1.6 INJECTION, SOLUTION INTRAVENOUS at 08:57

## 2020-01-01 RX ADMIN — DOPAMINE HYDROCHLORIDE IN DEXTROSE 20 MCG/KG/MIN: 1.6 INJECTION, SOLUTION INTRAVENOUS at 09:44

## 2020-01-01 RX ADMIN — DOPAMINE HYDROCHLORIDE IN DEXTROSE 20 MCG/KG/MIN: 1.6 INJECTION, SOLUTION INTRAVENOUS at 21:36

## 2020-01-01 RX ADMIN — IPRATROPIUM BROMIDE AND ALBUTEROL SULFATE 3 ML: .5; 3 SOLUTION RESPIRATORY (INHALATION) at 00:28

## 2020-01-01 RX ADMIN — HEPARIN SODIUM 5000 UNITS: 10000 INJECTION, SOLUTION INTRAVENOUS; SUBCUTANEOUS at 23:30

## 2020-01-01 RX ADMIN — HEPARIN SODIUM 5000 UNITS: 10000 INJECTION, SOLUTION INTRAVENOUS; SUBCUTANEOUS at 08:47

## 2020-01-01 RX ADMIN — SEVELAMER CARBONATE 2400 MG: 800 TABLET, FILM COATED ORAL at 15:23

## 2020-01-01 RX ADMIN — PROPOFOL 20 MCG/KG/MIN: 10 INJECTION, EMULSION INTRAVENOUS at 04:21

## 2020-01-01 RX ADMIN — MINERAL SUPPLEMENT IRON 300 MG / 5 ML STRENGTH LIQUID 100 PER BOX UNFLAVORED 300 MG: at 08:45

## 2020-01-01 RX ADMIN — SEVELAMER CARBONATE 2400 MG: 800 TABLET, FILM COATED ORAL at 07:48

## 2020-01-01 RX ADMIN — HEPARIN SODIUM 5000 UNITS: 10000 INJECTION, SOLUTION INTRAVENOUS; SUBCUTANEOUS at 08:58

## 2020-01-01 RX ADMIN — DOPAMINE HYDROCHLORIDE IN DEXTROSE 20 MCG/KG/MIN: 1.6 INJECTION, SOLUTION INTRAVENOUS at 12:48

## 2020-01-01 RX ADMIN — AMLODIPINE BESYLATE 10 MG: 10 TABLET ORAL at 09:07

## 2020-01-01 RX ADMIN — FERROUS SULFATE TAB 325 MG (65 MG ELEMENTAL FE) 325 MG: 325 (65 FE) TAB at 17:28

## 2020-01-01 RX ADMIN — BRIMONIDINE TARTRATE 1 DROP: 2 SOLUTION OPHTHALMIC at 16:44

## 2020-01-01 RX ADMIN — BRIMONIDINE TARTRATE 1 DROP: 2 SOLUTION OPHTHALMIC at 09:04

## 2020-01-01 RX ADMIN — IPRATROPIUM BROMIDE AND ALBUTEROL SULFATE 3 ML: .5; 3 SOLUTION RESPIRATORY (INHALATION) at 20:59

## 2020-01-01 RX ADMIN — DORZOLAMIDE HYDROCHLORIDE 1 DROP: 20 SOLUTION/ DROPS OPHTHALMIC at 21:08

## 2020-01-01 RX ADMIN — FERROUS SULFATE TAB 325 MG (65 MG ELEMENTAL FE) 325 MG: 325 (65 FE) TAB at 08:48

## 2020-01-01 RX ADMIN — DOPAMINE HYDROCHLORIDE: 160 INJECTION, SOLUTION INTRAVENOUS at 00:46

## 2020-01-01 RX ADMIN — MORPHINE SULFATE 2 MG: 2 INJECTION, SOLUTION INTRAMUSCULAR; INTRAVENOUS at 02:52

## 2020-01-01 RX ADMIN — BRIMONIDINE TARTRATE 1 DROP: 2 SOLUTION OPHTHALMIC at 15:38

## 2020-01-01 RX ADMIN — IPRATROPIUM BROMIDE AND ALBUTEROL SULFATE 3 ML: .5; 3 SOLUTION RESPIRATORY (INHALATION) at 00:27

## 2020-01-01 RX ADMIN — DORZOLAMIDE HYDROCHLORIDE 1 DROP: 20 SOLUTION/ DROPS OPHTHALMIC at 16:06

## 2020-01-01 RX ADMIN — HEPARIN SODIUM 5000 UNITS: 10000 INJECTION, SOLUTION INTRAVENOUS; SUBCUTANEOUS at 06:08

## 2020-01-01 RX ADMIN — METRONIDAZOLE 500 MG: 500 INJECTION, SOLUTION INTRAVENOUS at 09:07

## 2020-01-01 RX ADMIN — SEVELAMER CARBONATE 2.4 G: 2.4 POWDER, FOR SUSPENSION ORAL at 16:40

## 2020-01-01 RX ADMIN — BRIMONIDINE TARTRATE 1 DROP: 2 SOLUTION OPHTHALMIC at 15:01

## 2020-01-01 RX ADMIN — HEPARIN SODIUM 5000 UNITS: 10000 INJECTION, SOLUTION INTRAVENOUS; SUBCUTANEOUS at 22:38

## 2020-01-01 RX ADMIN — OXYCODONE HYDROCHLORIDE 10 MG: 10 TABLET, FILM COATED, EXTENDED RELEASE ORAL at 22:27

## 2020-01-01 RX ADMIN — FERROUS SULFATE TAB 325 MG (65 MG ELEMENTAL FE) 325 MG: 325 (65 FE) TAB at 18:06

## 2020-01-01 RX ADMIN — DOPAMINE HYDROCHLORIDE IN DEXTROSE 5 MCG/KG/MIN: 1.6 INJECTION, SOLUTION INTRAVENOUS at 18:18

## 2020-01-01 RX ADMIN — IPRATROPIUM BROMIDE AND ALBUTEROL SULFATE 3 ML: .5; 3 SOLUTION RESPIRATORY (INHALATION) at 20:48

## 2020-01-01 RX ADMIN — DOPAMINE HYDROCHLORIDE IN DEXTROSE 5 MCG/KG/MIN: 1.6 INJECTION, SOLUTION INTRAVENOUS at 08:14

## 2020-01-01 RX ADMIN — PROPOFOL 20 MCG/KG/MIN: 10 INJECTION, EMULSION INTRAVENOUS at 02:21

## 2020-01-01 RX ADMIN — HEPARIN SODIUM 5000 UNITS: 10000 INJECTION, SOLUTION INTRAVENOUS; SUBCUTANEOUS at 22:08

## 2020-01-01 RX ADMIN — BRIMONIDINE TARTRATE 1 DROP: 2 SOLUTION OPHTHALMIC at 21:22

## 2020-01-01 RX ADMIN — VANCOMYCIN HYDROCHLORIDE 1000 MG: 1 INJECTION, POWDER, LYOPHILIZED, FOR SOLUTION INTRAVENOUS at 19:20

## 2020-01-01 RX ADMIN — MIDAZOLAM HYDROCHLORIDE: 1 INJECTION, SOLUTION INTRAMUSCULAR; INTRAVENOUS at 17:45

## 2020-01-01 RX ADMIN — DOCUSATE SODIUM 100 MG: 100 CAPSULE, LIQUID FILLED ORAL at 21:37

## 2020-01-01 RX ADMIN — BRIMONIDINE TARTRATE 1 DROP: 2 SOLUTION OPHTHALMIC at 08:47

## 2020-01-01 RX ADMIN — PROPOFOL 35 MCG/KG/MIN: 10 INJECTION, EMULSION INTRAVENOUS at 02:00

## 2020-01-01 RX ADMIN — DOPAMINE HYDROCHLORIDE IN DEXTROSE 5 MCG/KG/MIN: 1.6 INJECTION, SOLUTION INTRAVENOUS at 15:01

## 2020-01-01 RX ADMIN — FERROUS SULFATE TAB 325 MG (65 MG ELEMENTAL FE) 325 MG: 325 (65 FE) TAB at 16:06

## 2020-01-01 RX ADMIN — MAGNESIUM SULFATE HEPTAHYDRATE 1 G: 40 INJECTION, SOLUTION INTRAVENOUS at 04:22

## 2020-01-01 RX ADMIN — BISACODYL 10 MG: 10 SUPPOSITORY RECTAL at 21:08

## 2020-01-01 RX ADMIN — OXYCODONE HYDROCHLORIDE 10 MG: 10 TABLET, FILM COATED, EXTENDED RELEASE ORAL at 14:36

## 2020-01-01 RX ADMIN — MINERAL SUPPLEMENT IRON 300 MG / 5 ML STRENGTH LIQUID 100 PER BOX UNFLAVORED 300 MG: at 17:21

## 2020-01-01 RX ADMIN — ATORVASTATIN CALCIUM 40 MG: 40 TABLET, FILM COATED ORAL at 22:12

## 2020-01-01 RX ADMIN — DOPAMINE HYDROCHLORIDE IN DEXTROSE 10 MCG/KG/MIN: 1.6 INJECTION, SOLUTION INTRAVENOUS at 08:54

## 2020-01-01 RX ADMIN — BRIMONIDINE TARTRATE 1 DROP: 2 SOLUTION OPHTHALMIC at 17:59

## 2020-01-01 RX ADMIN — HEPARIN SODIUM 2600 UNITS: 1000 INJECTION, SOLUTION INTRAVENOUS; SUBCUTANEOUS at 17:53

## 2020-01-01 RX ADMIN — DOCUSATE SODIUM 100 MG: 50 LIQUID ORAL at 22:38

## 2020-01-01 RX ADMIN — POLYETHYLENE GLYCOL 3350 17 G: 17 POWDER, FOR SOLUTION ORAL at 09:07

## 2020-01-01 RX ADMIN — EPINEPHRINE 1 MG: 0.1 INJECTION, SOLUTION ENDOTRACHEAL; INTRACARDIAC; INTRAVENOUS at 04:13

## 2020-01-01 RX ADMIN — BRIMONIDINE TARTRATE 1 DROP: 2 SOLUTION OPHTHALMIC at 22:29

## 2020-01-01 RX ADMIN — MINERAL SUPPLEMENT IRON 300 MG / 5 ML STRENGTH LIQUID 100 PER BOX UNFLAVORED 300 MG: at 17:03

## 2020-01-01 RX ADMIN — PANTOPRAZOLE SODIUM 40 MG: 40 TABLET, DELAYED RELEASE ORAL at 07:48

## 2020-01-01 RX ADMIN — DORZOLAMIDE HYDROCHLORIDE 1 DROP: 20 SOLUTION/ DROPS OPHTHALMIC at 09:07

## 2020-01-01 RX ADMIN — SEVELAMER CARBONATE 2.4 G: 2.4 POWDER, FOR SUSPENSION ORAL at 12:31

## 2020-01-01 RX ADMIN — ATROPINE SULFATE 1 MG: 0.1 INJECTION PARENTERAL at 03:08

## 2020-01-01 RX ADMIN — NOREPINEPHRINE BITARTRATE 2 MCG/MIN: 1 INJECTION, SOLUTION, CONCENTRATE INTRAVENOUS at 17:33

## 2020-01-01 RX ADMIN — MIRTAZAPINE 15 MG: 15 TABLET, FILM COATED ORAL at 22:33

## 2020-01-01 RX ADMIN — HEPARIN SODIUM 5000 UNITS: 5000 INJECTION INTRAVENOUS; SUBCUTANEOUS at 05:46

## 2020-01-01 RX ADMIN — FERROUS SULFATE TAB 325 MG (65 MG ELEMENTAL FE) 325 MG: 325 (65 FE) TAB at 16:40

## 2020-01-01 RX ADMIN — CEFEPIME 2 G: 2 INJECTION, POWDER, FOR SOLUTION INTRAVENOUS at 07:49

## 2020-01-01 RX ADMIN — IPRATROPIUM BROMIDE AND ALBUTEROL SULFATE 3 ML: .5; 3 SOLUTION RESPIRATORY (INHALATION) at 12:59

## 2020-01-01 RX ADMIN — IPRATROPIUM BROMIDE AND ALBUTEROL SULFATE 3 ML: .5; 3 SOLUTION RESPIRATORY (INHALATION) at 20:23

## 2020-01-01 RX ADMIN — POTASSIUM CHLORIDE 20 MEQ: 7.46 INJECTION, SOLUTION INTRAVENOUS at 09:21

## 2020-01-01 RX ADMIN — IPRATROPIUM BROMIDE AND ALBUTEROL SULFATE 3 ML: .5; 3 SOLUTION RESPIRATORY (INHALATION) at 08:55

## 2020-01-01 RX ADMIN — MAGNESIUM SULFATE HEPTAHYDRATE 1 G: 40 INJECTION, SOLUTION INTRAVENOUS at 04:13

## 2020-01-01 RX ADMIN — OXYCODONE HYDROCHLORIDE 10 MG: 10 TABLET, FILM COATED, EXTENDED RELEASE ORAL at 09:06

## 2020-01-01 RX ADMIN — DOCUSATE SODIUM 100 MG: 50 LIQUID ORAL at 08:45

## 2020-01-01 RX ADMIN — BRIMONIDINE TARTRATE 1 DROP: 2 SOLUTION OPHTHALMIC at 22:40

## 2020-01-01 RX ADMIN — MIRTAZAPINE 15 MG: 15 TABLET, FILM COATED ORAL at 22:36

## 2020-01-01 RX ADMIN — PROPOFOL 35 MCG/KG/MIN: 10 INJECTION, EMULSION INTRAVENOUS at 10:53

## 2020-01-01 RX ADMIN — DOPAMINE HYDROCHLORIDE IN DEXTROSE 15 MCG/KG/MIN: 1.6 INJECTION, SOLUTION INTRAVENOUS at 12:31

## 2020-01-01 RX ADMIN — DOPAMINE HYDROCHLORIDE IN DEXTROSE 7.5 MCG/KG/MIN: 1.6 INJECTION, SOLUTION INTRAVENOUS at 17:06

## 2020-01-01 RX ADMIN — Medication 10 MCG/MIN: at 08:13

## 2020-01-01 RX ADMIN — HEPARIN SODIUM 5000 UNITS: 10000 INJECTION, SOLUTION INTRAVENOUS; SUBCUTANEOUS at 22:44

## 2020-01-01 RX ADMIN — IPRATROPIUM BROMIDE AND ALBUTEROL SULFATE 3 ML: .5; 3 SOLUTION RESPIRATORY (INHALATION) at 13:17

## 2020-01-01 RX ADMIN — VANCOMYCIN HYDROCHLORIDE 1000 MG: 1 INJECTION, POWDER, LYOPHILIZED, FOR SOLUTION INTRAVENOUS at 16:05

## 2020-01-01 RX ADMIN — Medication 10 ML: at 21:40

## 2020-01-01 RX ADMIN — SODIUM BICARBONATE 650 MG: 650 TABLET ORAL at 09:08

## 2020-01-01 RX ADMIN — BRIMONIDINE TARTRATE 1 DROP: 2 SOLUTION OPHTHALMIC at 18:42

## 2020-01-01 RX ADMIN — PANTOPRAZOLE SODIUM 40 MG: 40 TABLET, DELAYED RELEASE ORAL at 06:47

## 2020-01-01 RX ADMIN — AMLODIPINE BESYLATE 10 MG: 10 TABLET ORAL at 10:38

## 2020-01-01 RX ADMIN — SEVELAMER CARBONATE 2400 MG: 800 TABLET, FILM COATED ORAL at 17:51

## 2020-01-01 RX ADMIN — POLYETHYLENE GLYCOL 3350 17 G: 17 POWDER, FOR SOLUTION ORAL at 09:10

## 2020-01-01 RX ADMIN — POLYETHYLENE GLYCOL 3350 17 G: 17 POWDER, FOR SOLUTION ORAL at 08:58

## 2020-01-01 RX ADMIN — SODIUM BICARBONATE 50 MEQ: 84 INJECTION INTRAVENOUS at 03:42

## 2020-01-01 RX ADMIN — IPRATROPIUM BROMIDE AND ALBUTEROL SULFATE 3 ML: .5; 3 SOLUTION RESPIRATORY (INHALATION) at 02:06

## 2020-01-01 RX ADMIN — ATORVASTATIN CALCIUM 40 MG: 40 TABLET, FILM COATED ORAL at 21:37

## 2020-07-27 PROBLEM — Z99.2 KIDNEY DISEASE, CHRONIC, END STAGE ON DIALYSIS (HCC): Status: ACTIVE | Noted: 2020-01-01

## 2020-07-27 PROBLEM — I10 HYPERTENSION: Status: ACTIVE | Noted: 2020-01-01

## 2020-07-27 PROBLEM — E78.00 HYPERCHOLESTEROLEMIA: Status: ACTIVE | Noted: 2020-01-01

## 2020-07-27 PROBLEM — N18.6 KIDNEY DISEASE, CHRONIC, END STAGE ON DIALYSIS (HCC): Status: ACTIVE | Noted: 2020-01-01

## 2020-07-28 PROBLEM — T82.898S: Status: ACTIVE | Noted: 2020-01-01

## 2020-07-28 NOTE — PROGRESS NOTES
VASCULAR FOLLOW UP Subjective: CHIEF COMPLAINTS: AV fistula issue. PRESENTATION: 
Mr. Grace Lora is here today for 2 weeks follow-up after AV fistula placement. Patient was recently hospitalized for GI bleed. Patient is having fever and confused today. Patient examined at the bedside. Patient is now quite coherent. Patient denies any chest pain shortness of breath. Past Medical History:  
Diagnosis Date  Anemia  Chronic kidney disease  Chronic pain   
 back and neck  Hypercholesterolemia  Hypertension Past Surgical History:  
Procedure Laterality Date  HX ARTERIOVENOUS FISTULA  2020  HX UROLOGICAL    
 bladder surgery Family History Problem Relation Age of Onset  Diabetes Mother Social History Tobacco Use  Smoking status: Former Smoker Packs/day: 0.50 Years: 10.00 Pack years: 5.00 Last attempt to quit:  Years since quittin.5  Smokeless tobacco: Never Used Substance Use Topics  Alcohol use: Not Currently Prior to Admission medications Not on File Allergies Allergen Reactions  Penicillins Itching  Acetaminophen Itching  Codeine Itching Review of Systems: I reviewed the rest of organ systems personally and they were negative signed by Dr. Derick Powell Objective:  
 
 
Physical Exam:  
General appearance: alert, cooperative, no distress, appears stated age Patient looks to be a bit confused. Cardiovascular regular rate pulmonary lungs are clear abdomen soft neurologically intact. I examined the left forearm AV fistula. There is some severe but looks like amount of the blood volume is diminished. I also remove the sutures from the left forearm AV fistula revision. Data Review:  
No visits with results within 1 Month(s) from this visit. Latest known visit with results is: No results found for any previous visit. Assessment: Problem List Items Addressed This Visit Other AV fistula occlusion, sequela - Primary Plan:  
 
Patient looks like quite unsteady today. He is delirious and that he is having fever spikes and he is to go to the emergency room to figure out what is going on. In terms of AV fistula revision he probably needs AV graft placement. I will reassess him once he is released at this hospitalization [facility]bel options either revise all the fistula with additional venogram with angioplasty or possibly and placing AV graft.  
 
 
Ronaldo Yañez MD

## 2020-08-05 NOTE — TELEPHONE ENCOUNTER
PT states he has not gotten dialysis for 5 days and they (his is not sure who) came over to his house and Aurther Frederick can not go back to his old dialysis place due to being diagnosed with covid 19. Please call pt. He is very confused.    -pt called about this again. Per bo, pt has an appt for dialysis in [facility] tomorrow. I told this to the patient.  And that he has home health through [facility] and gave him the number to call

## 2020-08-05 NOTE — TELEPHONE ENCOUNTER
Called spoke to pt-he went to ER [facility]). He says they are supposed to send him to [facility] for dialysis but they did not and he still has not gotten dialysis in 5 days. He says he feels bad. I called [facility] home [facility] and spoke to [facility], she says his nurse made a home visit yesterday 08/04/2020>her name is Arlene Islas. She is the one that called 911 and sent him to ER. She will call and speak with her as well as Case management at [facility] to see what is going on with Mr Gustafson's care. He is scheduled for dialysis tomorrow 08/06/2020 in [place]. Due to positive Covid test and fever. I gave [facility] number to Mr Korina Roe and he says he is going to call her now and hung up.

## 2020-08-06 NOTE — TELEPHONE ENCOUNTER
carmen pt-no answer. I left message regarding pt just picked up 40 tablets of percocet on 07/28/2020. He should not be out at this time. Erika. Since he was in hospital from 07/29/2020 thru 08/03/2020. Please call office, it is too early for refill.  Thank you

## 2020-08-13 NOTE — TELEPHONE ENCOUNTER
Pt needs ov to check sutures and needs to be seen before any pain medication is given. Please give pt appt in [place] [id number] office on 08/21/2020. [facility] will call the pt. Thank you.

## 2020-08-13 NOTE — TELEPHONE ENCOUNTER
Patient called stated no one has called about his stitches in his left arm. Stated also needs something for pain.  Please call patient

## 2020-08-17 NOTE — TELEPHONE ENCOUNTER
Pt would like to have pain medication. I have told him that he has to wait until his appt on the 21st but he would still like for me to put in a request due to the amount of pain he is in. Please call pt.  He states he does not get return phone calls or he will have to go to another

## 2020-08-18 NOTE — TELEPHONE ENCOUNTER
Pt will need office visit before he can have percocet. He is scheduled for 08/21/2020. Spoke to pt regarding this and he verbalized understanding and will keep his appointment in [facility] on 08/21/2020, 9am.

## 2020-08-21 PROBLEM — T82.590A DIALYSIS AV FISTULA MALFUNCTION (HCC): Status: ACTIVE | Noted: 2020-01-01

## 2020-08-21 NOTE — PROGRESS NOTES
VASCULAR FOLLOW UP Subjective: CHIEF COMPLAINTS: 
Malfunctioning AV fistula PRESENTATION OF ILLNESS: 
Mr. Rajinder Avila is here today for follow-up. He was recently hospitalized for COVID-19 he has recovered from that. He currently call with test negative after that hospitalization. Unfortunately with recent hospitalization he is AV fistula left forearm has  shutdown. He is going to require AV graft placement Past Medical History:  
Diagnosis Date  Anemia  Chronic kidney disease  Chronic pain   
 back and neck  Hypercholesterolemia  Hypertension Past Surgical History:  
Procedure Laterality Date  HX ARTERIOVENOUS FISTULA  2020  HX UROLOGICAL    
 bladder surgery Family History Problem Relation Age of Onset  Diabetes Mother Social History Tobacco Use  Smoking status: Former Smoker Packs/day: 0.50 Years: 10.00 Pack years: 5.00 Last attempt to quit:  Years since quittin.6  Smokeless tobacco: Never Used Substance Use Topics  Alcohol use: Not Currently Prior to Admission medications Not on File Allergies Allergen Reactions  Penicillins Itching  Acetaminophen Itching  Codeine Itching Review of Systems: I reviewed the rest of organ systems personally and they were negative signed by Dr. Rodríguez Score Objective:  
 
Visit Vitals /57 (BP 1 Location: Left arm, BP Patient Position: Sitting) Pulse 85 Temp 97.7 °F (36.5 °C) (Temporal) Ht 5' 9\" (1.753 m) Wt 164 lb (74.4 kg) SpO2 97% BMI 24.22 kg/m² VITAL SIGNS REVIEWED. Physical Exam: 
Patient is well-nourished pleasant in conversation is appropriate. Head and neck examination atraumatic, normocephalic. Gaze appropriate. Conversation appropriate. Neck examination shows supple. No mass. No obvious carotid bruit. Chest examination shows lungs are clear bilaterally well-expanded, no crackles or wheezes. Cardiovascular system regular rate, no obvious murmur. Skin warm to touch  and moist, no skin lesions. Abdomen is soft ,not tender or distended bowel sounds present. No palpable mass. Neurological examinations, no focal neuro deficits moving all 4 extremities. Cranial nerves intact. Sensation is intact as well. Hematologic: No obvious bruise or swelling or obvious lymphadenopathy. Psychosocial: Appropriate. Has good effect. Musculoskeletal system: No muscle wasting, appropriate movements upper and lower extremity. Vascular examination:no thrill noted Data Review:  
No visits with results within 1 Month(s) from this visit. Latest known visit with results is: No results found for any previous visit. Assessment:  
 
Problem List Items Addressed This Visit Other Dialysis AV fistula malfunction ([facility] [address] - Primary Plan:  
 
I will schedule for AV graft at the left upper arm Sometime September next month. I discussed with patient rationale for the procedure, rationale for the utilization of the graft, risk benefits complication.  
 
 
Paddy Perera MD

## 2020-08-21 NOTE — PATIENT INSTRUCTIONS

## 2020-09-04 NOTE — TELEPHONE ENCOUNTER
Patient called would like to know if he needs to come in to have stitches taken out. Also stated would like a refill on his pain medication.  Thank you

## 2020-09-15 NOTE — TELEPHONE ENCOUNTER
Pat Gibson called from [address] want to know whats the plans for Mr. Isidro Hiss vascular concern please called Pat Gibson back at [phone]

## 2020-09-15 NOTE — TELEPHONE ENCOUNTER
Called Sundeep Deleon back using number given>wrong number. [facility] is [phone].   I called the correct number and they are currently closed-they will reopen tomorrow-09/16/2020

## 2020-09-20 NOTE — ED NOTES
Attempted x 3 to obtain patients blood work and establish IV. PT agitated and disrespectful to writer. PT stated to leave him alone. Dr. Reed No of pts refusal of labs

## 2020-09-20 NOTE — ED PROVIDER NOTES
PT REPORTS SAME SYMPTOMS PRESENT FOR THE PAST 2 WEEKS; NOTHING WORSE Generalized Body Aches Associated symptoms include chest pain and shortness of breath. Past Medical History:  
Diagnosis Date  Anemia  Chronic kidney disease  Chronic pain   
 back and neck  Hypercholesterolemia  Hypertension Past Surgical History:  
Procedure Laterality Date  HX ARTERIOVENOUS FISTULA  2020  HX UROLOGICAL    
 bladder surgery Family History:  
Problem Relation Age of Onset  Diabetes Mother Social History Socioeconomic History  Marital status: SINGLE Spouse name: Not on file  Number of children: Not on file  Years of education: Not on file  Highest education level: Not on file Occupational History  Not on file Social Needs  Financial resource strain: Not on file  Food insecurity Worry: Not on file Inability: Not on file  Transportation needs Medical: Not on file Non-medical: Not on file Tobacco Use  Smoking status: Former Smoker Packs/day: 0.50 Years: 10.00 Pack years: 5.00 Last attempt to quit:  Years since quittin.7  Smokeless tobacco: Never Used Substance and Sexual Activity  Alcohol use: Not Currently  Drug use: Not Currently  Sexual activity: Not on file Lifestyle  Physical activity Days per week: Not on file Minutes per session: Not on file  Stress: Not on file Relationships  Social connections Talks on phone: Not on file Gets together: Not on file Attends Protestant service: Not on file Active member of club or organization: Not on file Attends meetings of clubs or organizations: Not on file Relationship status: Not on file  Intimate partner violence Fear of current or ex partner: Not on file Emotionally abused: Not on file Physically abused: Not on file Forced sexual activity: Not on file Other Topics Concern  Not on file Social History Narrative  Not on file ALLERGIES: Penicillins; Acetaminophen; and Codeine Review of Systems Constitutional: Positive for appetite change and fatigue. HENT: Negative. Eyes: Negative. Respiratory: Positive for shortness of breath. Negative for chest tightness and wheezing. Cardiovascular: Positive for chest pain. Negative for leg swelling. Gastrointestinal: Negative. Musculoskeletal: Positive for myalgias. Negative for back pain, gait problem, neck pain and neck stiffness. Neurological: Negative. Vitals:  
 09/20/20 0552 BP: (!) 155/72 Pulse: (!) 106 Resp: 20 Temp: 99.6 °F (37.6 °C) SpO2: 93% Weight: 74.8 kg (165 lb) Height: 5' 8\" (1.727 m) Physical Exam 
HENT:  
   Head: Normocephalic and atraumatic. Mouth/Throat:  
   Mouth: Mucous membranes are moist.  
Eyes:  
   Extraocular Movements: Extraocular movements intact. Pupils: Pupils are equal, round, and reactive to light. Neck: Musculoskeletal: Normal range of motion. Cardiovascular:  
   Rate and Rhythm: Normal rate. Heart sounds: Murmur present. Pulmonary:  
   Effort: Pulmonary effort is normal. No respiratory distress. Breath sounds: Normal breath sounds. No stridor. No wheezing, rhonchi or rales. Chest:  
   Chest wall: No tenderness. Abdominal:  
   General: Abdomen is flat. Bowel sounds are normal.  
   Palpations: Abdomen is soft. Tenderness: There is no abdominal tenderness. Musculoskeletal: Normal range of motion. General: No tenderness or signs of injury. Right lower leg: No edema. Left lower leg: No edema. Skin: 
   General: Skin is warm and dry. Capillary Refill: Capillary refill takes less than 2 seconds. Neurological:  
   General: No focal deficit present. Mental Status: He is alert. MDM Number of Diagnoses or Management Options Diagnosis management comments: PT REFUSING LABS AND CHEST XR 
 
  
 
Procedures

## 2020-09-20 NOTE — ED NOTES
PT requested writer to put on shoe, when writer put on patients shoe patient hit writer in the shoulder stating his foot was tender. 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 Writer apologized, cleaned patients room up and exited the room.

## 2020-09-20 NOTE — ED NOTES
PT noted to push open ambulance door and exit ED. Writer attempted to speak with patient to continue treatment and speak with physician but patient refused and walked towards parking lot.

## 2020-09-21 PROBLEM — G89.29 CHRONIC PAIN: Status: ACTIVE | Noted: 2020-01-01

## 2020-09-21 PROBLEM — K29.70 GASTRITIS: Status: ACTIVE | Noted: 2020-01-01

## 2020-09-21 PROBLEM — R10.9 ABDOMINAL PAIN: Status: ACTIVE | Noted: 2020-01-01

## 2020-09-21 PROBLEM — N18.6 ESRD (END STAGE RENAL DISEASE) ON DIALYSIS (HCC): Status: ACTIVE | Noted: 2020-01-01

## 2020-09-21 PROBLEM — E87.70 FLUID OVERLOAD: Status: ACTIVE | Noted: 2020-01-01

## 2020-09-21 PROBLEM — D64.9 ANEMIA: Status: ACTIVE | Noted: 2020-01-01

## 2020-09-21 PROBLEM — Z99.2 ESRD (END STAGE RENAL DISEASE) ON DIALYSIS (HCC): Status: ACTIVE | Noted: 2020-01-01

## 2020-09-21 NOTE — ASSESSMENT & PLAN NOTE
-Hemoglobin is stable and iron profile shows iron deficiency with iron level of 18 
-Patient on ferrous sulfate 325 mg oral twice daily with meals

## 2020-09-21 NOTE — ASSESSMENT & PLAN NOTE
- ESRD on HD M,W, F 
- nephrology consulted  
- continue dialysis as scheduled by nephrology . - restart patient home meds of Renvela.  TICHADD WM

## 2020-09-21 NOTE — ED PROVIDER NOTES
Patient brought to the ER with report of fever and abdominal pain, for past 2 days. Patient has renal failure and is on hemodialysis. Duration:  2 days Intensity: severe Modified by: pressure. Past Medical History:  
Diagnosis Date  Anemia  Chronic kidney disease  Chronic pain   
 back and neck  Hypercholesterolemia  Hypertension Past Surgical History:  
Procedure Laterality Date  HX ARTERIOVENOUS FISTULA  2020  HX UROLOGICAL    
 bladder surgery Family History:  
Problem Relation Age of Onset  Diabetes Mother Social History Socioeconomic History  Marital status: SINGLE Spouse name: Not on file  Number of children: Not on file  Years of education: Not on file  Highest education level: Not on file Occupational History  Not on file Social Needs  Financial resource strain: Not on file  Food insecurity Worry: Not on file Inability: Not on file  Transportation needs Medical: Not on file Non-medical: Not on file Tobacco Use  Smoking status: Former Smoker Packs/day: 0.50 Years: 10.00 Pack years: 5.00 Last attempt to quit:  Years since quittin.7  Smokeless tobacco: Never Used Substance and Sexual Activity  Alcohol use: Not Currently  Drug use: Not Currently  Sexual activity: Not on file Lifestyle  Physical activity Days per week: Not on file Minutes per session: Not on file  Stress: Not on file Relationships  Social connections Talks on phone: Not on file Gets together: Not on file Attends Judaism service: Not on file Active member of club or organization: Not on file Attends meetings of clubs or organizations: Not on file Relationship status: Not on file  Intimate partner violence Fear of current or ex partner: Not on file Emotionally abused: Not on file Physically abused: Not on file Forced sexual activity: Not on file Other Topics Concern  Not on file Social History Narrative  Not on file ALLERGIES: Penicillins; Acetaminophen; and Codeine Review of Systems HENT: Negative. Eyes: Negative. Respiratory: Negative. Cardiovascular: Negative. Gastrointestinal: Positive for abdominal pain. Endocrine: Negative. Genitourinary: Negative. Musculoskeletal: Positive for arthralgias. Skin: Negative. Allergic/Immunologic: Negative. Neurological: Negative. Hematological: Negative. Psychiatric/Behavioral: Negative. Vitals:  
 09/21/20 3991 BP: (!) 159/79 Pulse: (!) 110 Resp: 18 Temp: 100 °F (37.8 °C) SpO2: 90% Physical Exam 
Vitals signs and nursing note reviewed. Constitutional:   
   Appearance: He is ill-appearing. HENT:  
   Head: Normocephalic and atraumatic. Neck: Musculoskeletal: Normal range of motion and neck supple. Cardiovascular:  
   Rate and Rhythm: Normal rate and regular rhythm. Pulses: Normal pulses. Heart sounds: Normal heart sounds. Pulmonary:  
   Effort: Pulmonary effort is normal.  
   Breath sounds: Normal breath sounds. Abdominal:  
   General: Bowel sounds are normal.  
   Palpations: Abdomen is soft. Tenderness: There is generalized abdominal tenderness. Musculoskeletal: Normal range of motion. Skin: 
   General: Skin is warm and dry. Neurological:  
   General: No focal deficit present. Mental Status: He is oriented to person, place, and time. Mental status is at baseline. Psychiatric:     
   Mood and Affect: Mood normal.     
   Behavior: Behavior normal.  
 
  
 
MDM Number of Diagnoses or Management Options Risk of Complications, Morbidity, and/or Mortality Presenting problems: moderate Diagnostic procedures: moderate General comments: EKG- sinus tachycardia 105, LVH. Nonspecific ST changes. Discussed with Dr Adelaide Darnell and he will admit patient for dialysis Patient Progress Patient progress: improved ED Course as of Sep 21 0402 Mon Sep 21, 2020  
0325 WBC(!): 15.4 [BH] 4580 Lactic acid: 1.4 [BH] 0210 reviewed Amylase: 83 [BH] 8708 Sodium(!): 131 [BH] 0337 Troponin-I, Qt.(!): 0.51 [BH] 0337 WBC(!): 15.4 [BH] 2217 reviewed HGB(!): 9.6 [BH] ED Course User Index [BH] Chirag Jaime MD  
 
 
Intubation Date/Time: 9/25/2020 7:34 PM 
Performed by: Lynsey Roberts MD 
Authorized by: Lynsey Roberts MD  
 
Placement assessment: ETT to lip:  17 CXR findings:  ETT in proper place

## 2020-09-21 NOTE — ED TRIAGE NOTES
Pt arrived via ems from home c/o abdominal pain. As per EMS their initial call was for shortness of breath, however when they arrived on scene they found the pt groaning in pain with abdominal pain. Ems reported that a neighbor had heard him screaming for a while and called ems.  Pt reports that he is having generalized abdominal pain with no n/v/d.

## 2020-09-21 NOTE — ASSESSMENT & PLAN NOTE
-Patient presents with extreme abdominal pain and patient has a history of gastritis CT of the abdomen pelvis shows perinephric stranding possibility of gastritis/pancreatitis patient has had a history  
-Patient does have chronic pain issues and is on twice daily OxyContin 15 mg as well as immediate release oxycodone 5 mg every 4 hours as needed 
-Did place patient on as needed morphine 2 mg which was decreased to 1mg  
-Lipase levels normal range 
-Also started on Pepcid 20 mg IV twice daily 
-Patient's home pantoprazole 40 mg oral daily 
-Patient also on carafate 1 g oral prior to prior  Meals but was recommended to be used cautiosly so will dc  
-GI consultation appreciated. Recommended to treat for possible infectious source as CT abdomen showed diverituclosis but patient pain is more gastric in nature but start metrondiazole and levaqquin dose as per pharmacy  
-Patient KUB done on 9/22 shows extensive constipation and was given Colace 100 mg oral twice daily MiraLAX dose as well as a bisacodyl suppository and nurse notes the patient did have bowel movement this morning and also will get a soapsuds enema. Patient's abdominal pain has improved follow-up in a.m. we will most likely discontinue all antibiotics and will advance patient's diet

## 2020-09-21 NOTE — CONSULTS
Gastroenterology Consult Patient: Treasure Navarro MRN: [id number]  SSN: [id number] YOB: 1950  Age: 78 y.o. Sex: male Assessment:  
 
1. Generalized abdominal pain The elevation in white blood cell count consider an infection this is can be seen with diverticulitis and even without obvious CT scan evidence of diverticulitis. Agree with obtaining pancreatic enzymes, however from normal place it makes it less likely. 2.  Leukocytosis Highly suggestive of an infectious cause for elevation and possibly for the pain. 3.  End-stage renal diseasedialysis dependent 4. Fluid overload 5. Chronic pain syndrome 6. Anemia of chronic disease 7. Constipation Plan: 1. Plan work-up for possible sources of infection which would include blood cultures, x-ray (done) urine culture if patient makes enough urine still. 2.  Continue the daily PPI therapy. 3.  Would avoid use of the Carafate on patient with end-stage renal disease. 4.  Agree with obtaining a pancreatic enzymes, however CT scans are more consistent with renal issues then with the pancreas. 5.  Consider starting patient on antibiotic therapy after cultures have been obtained, that will cover the GI tract. This will include metronidazole and ciprofloxacin or levofloxacin. 6.  Agree with the stool softener, may consider laxative therapy if there is no significant results, particularly patient being on narcotic pain medications Subjective:  
 
Reason for consultation: Abdominal pain History: 67-year-old male with history of hypertension, hyperlipidemia, end-stage renal diseasedialysis dependent, chronic anemia, and chronic pain syndrome who was admitted with shortness of breath abdominal pain. Patient was found to have evidence of fluid overload leukocytosis, macrocytic anemia. He had a CT scan done which showed some chronic.   Chronic stranding, diverticulosis without diverticulitis, bilateral pleural effusions. Patient states that the abdominal pain is generalized but most prominent in the upper abdomen. He states he has nausea and vomiting on eating. No new medications. He states symptoms have been present for greater than 1 week. Patient states she has a lot of constipation. No gross GI bleeding. Patient states his dialysis today did help relieve pain for small period of time. Hospital Problems  Date Reviewed: 2020 Codes Class Noted POA  
 ESRD (end stage renal disease) on dialysis ([facility] ICD-10-CM: N18.6, Z99.2 ICD-9-CM: 585.6, V45.11  2020 Unknown Fluid overload ICD-10-CM: E87.70 ICD-9-CM: 276.69  2020 Unknown Abdominal pain ICD-10-CM: R10.9 ICD-9-CM: 789.00  2020 Unknown Anemia ICD-10-CM: D64.9 ICD-9-CM: 285.9  2020 Unknown Chronic pain ICD-10-CM: G89.29 ICD-9-CM: 338.29  2020 Unknown Gastritis ICD-10-CM: K29.70 ICD-9-CM: 535.50  2020 Unknown Kidney disease, chronic, end stage on dialysis ([facility] ICD-10-CM: N18.6, Z99.2 ICD-9-CM: 585.6, V45.11  2020 Yes Hypertension ICD-10-CM: I10 
ICD-9-CM: 401.9  2020 Yes Hypercholesterolemia ICD-10-CM: E78.00 ICD-9-CM: 272.0  2020 Yes Past Medical History:  
Diagnosis Date  Anemia  Chronic kidney disease  Chronic pain   
 back and neck  Hypercholesterolemia  Hypertension Past Surgical History:  
Procedure Laterality Date  HX ARTERIOVENOUS FISTULA  2020  HX CHOLECYSTECTOMY  HX UROLOGICAL    
 bladder surgery Family History Problem Relation Age of Onset  Diabetes Mother Social History Tobacco Use  Smoking status: Former Smoker Packs/day: 0.50 Years: 10.00 Pack years: 5.00 Last attempt to quit:  Years since quittin.7  Smokeless tobacco: Never Used Substance Use Topics  Alcohol use: Not Currently Current Facility-Administered Medications Medication Dose Route Frequency Provider Last Rate Last Dose  sodium chloride (NS) flush 5-40 mL  5-40 mL IntraVENous Q8H Romel Hernandez MD   10 mL at 09/21/20 1436  sodium chloride (NS) flush 5-40 mL  5-40 mL IntraVENous PRN Romel Hernandez MD      
 oxyCODONE IR (ROXICODONE) tablet 5 mg  5 mg Oral Q8H PRN Eliu Spencer MD      
 oxyCODONE ER (OxyCONTIN) tablet 10 mg  10 mg Oral Q12H Jadiel Sharif MD   10 mg at 09/21/20 1436  famotidine (PF) (PEPCID) 20 mg in 0.9% sodium chloride 10 mL injection  20 mg IntraVENous Q12H To Sharif MD      
 sucralfate (CARAFATE) tablet 1 g  1 g Oral AC&HS To Sharif MD      
 ferrous sulfate tablet 325 mg  1 Tab Oral BID WITH MEALS Eliu Spencer MD   325 mg at 09/21/20 1801  [START ON 9/22/2020] pantoprazole (PROTONIX) tablet 40 mg  40 mg Oral ACB Jadiel Sharif MD      
 [START ON 9/22/2020] amLODIPine (NORVASC) tablet 10 mg  10 mg Oral DAILY Jadiel Sharif MD      
 atorvastatin (LIPITOR) tablet 40 mg  40 mg Oral QHS Jadiel Sharif MD      
 carvediloL (COREG) tablet 25 mg  25 mg Oral BID WITH MEALS Jadiel Sharif MD      
 [START ON 9/22/2020] docusate sodium (COLACE) capsule 100 mg  100 mg Oral DAILY Jadiel Sharif MD      
 sevelamer carbonate (RENVELA) tab 1,600 mg  1,600 mg Oral TID WITH MEALS Jadiel Sharif MD      
 hydrALAZINE (APRESOLINE) tablet 25 mg  25 mg Oral TID Jadiel Sharif MD      
 sodium bicarbonate tablet 1,300 mg  1,300 mg Oral TID Jadiel Sharif MD      
 mirtazapine (REMERON) tablet 15 mg  15 mg Oral QHS To Sharif MD      
 morphine injection 2 mg  2 mg IntraVENous Q4H PRN Eliu Spencer MD      
  
 
Allergies Allergen Reactions  Penicillins Itching  Acetaminophen Itching  Codeine Itching Review of Systems: The review of systems was negative except for those that were presented in the history of present illness Objective:  
 
Vitals:  
 09/21/20 1109 09/21/20 1200 09/21/20 1224 09/21/20 1603 BP: (!) 137/59   (!) 148/69 Pulse: 98 (!) 105 (!) 103 (!) 105 Resp: 20 22 Temp: 98.3 °F (36.8 °C)   99.3 °F (37.4 °C) SpO2: 97%   94% Weight:      
Height:      
  
 
Physical Exam: 
GENERAL: Well developed well nourished male who appeared to be in acute pain HEENT: Atraumatic normocephalic, pupils equal and reactive to light and accommodation. LUNGS: Bibasilar rales HEART: S1-S2 regular, ABDOMEN: soft, non-tender, liver spleen are not palpable. There is no ascites that is obvious. Generalized tenderness with guarding, no rebound, bowel sounds present. EXTREMITIES: No edema or tenderness NEURO: grossly intact CBC w/Diff Recent Labs  
  09/21/20 
0215 WBC 15.4*  
RBC 2.82* HGB 9.6* HCT 28.9*  
 GRANS 93* LYMPH 2*  
EOS 1 Chemistry Recent Labs  
  09/21/20 
0600 09/21/20 
0215 GLU  --  142* NA  --  131* K  --  5.1 CL  --  97  
CO2  --  20* BUN  --  64* CREA  --  6.99* CA  --  10.5* PHOS 7.4*  --   
AGAP  --  14  
BUCR  --  9* AP  --  211* TP  --  8.0 ALB  --  2.8*  
GLOB  --  5.2* AGRAT  --  0.5* Lactic Acid Lactic acid Date Value Ref Range Status 09/21/2020 1.4 0.4 - 2.0 mmol/L Final  
 
Recent Labs  
  09/21/20 
0230 LAC 1.4 Micro  No results for input(s): SDES, CULT in the last 72 hours. No results for input(s): CULT in the last 72 hours. Liver Enzymes Protein, total  
Date Value Ref Range Status 09/21/2020 8.0 6.4 - 8.2 g/dL Final  
 
Albumin Date Value Ref Range Status 09/21/2020 2.8 (L) 3.5 - 5.0 g/dL Final  
 
Globulin Date Value Ref Range Status 09/21/2020 5.2 (H) 2.0 - 4.0 g/dL Final  
 
A-G Ratio Date Value Ref Range Status 09/21/2020 0.5 (L) 1.1 - 2.2   Final  
 
Alk. phosphatase Date Value Ref Range Status 09/21/2020 211 (H) 45 - 117 U/L Final  
 
Recent Labs  
  09/21/20 0215  
TP 8.0 ALB 2.8*  
GLOB 5.2* AGRAT 0.5* * Cardiac Enzymes Lab Results Component Value Date/Time TROIQ 0.65 (H) 09/21/2020 05:50 AM  
 TROIQ 0.51 (H) 09/21/2020 02:15 AM  
  
 
BNP No results found for: BNP, BNPP, XBNPT Coagulation Recent Labs  
  09/21/20 
0215 PTP 10.3 INR 1.0 APTT 25.8 Thyroid  No results found for: T4, T3U, TSH, TSHEXT Lipid Panel No results found for: CHOL, CHOLPOCT, CHOLX, CHLST, CHOLV, 763375, HDL, HDLP, LDL, LDLC, DLDLP, 388172, VLDLC, VLDL, TGLX, TRIGL, TRIGP, TGLPOCT, CHHD, CHHDX Urinalysis No results found for: COLOR, APPRN, SPGRU, REFSG, LEANN, PROTU, GLUCU, KETU, BILU, UROU, JEROME, LEUKU, GLUKE, EPSU, BACTU, WBCU, RBCU, CASTS, UCRY  
 
XR (Most Recent). CXR reviewed by me and compared with previous CXR Results from [facility] [place] on 09/20/20 XR CHEST PORT Narrative HISTORY:  Chest pain TECHNIQUE: Frontal view. COMPARISON: August 5, 2020 LIMITATIONS: None TUBES/LINES: Dialysis catheter tip projects over SVC. HEART AND PULMONARY VESSELS: Heart size is normal. The vascularity is congested 
and somewhat ill-defined. LUNG PARENCHYMA: Underexpanded lungs with perihilar subsegmental atelectasis on 
the left and lower lung zone streaky airspace disease bilaterally. PLEURA: No effusion or pneumothorax. MEDIASTINUM: Atherosclerotic aorta. BONE/SOFT TISSUES: Bony demineralization. OTHER: None Impression IMPRESSION: Vascular congestion suggests an element of fluid overload. Streaky 
airspace disease in both lower lung zones and at the left hilum may represent 
subsegmental atelectasis versus early infiltrate. No pneumothorax. CT (Most Recent) Results from [facility] [place] [facility] encounter on 09/21/20 CT ABD PELV WO CONT Narrative PROCEDURE: CT ABD PELV WO CONT 
 
HISTORY:Abdominal pain, fever COMPARISON:Abdominal pelvis CT July 18, 2020 LIMITATIONS: None TECHNIQUE: Axial images with multiplanar reconstruction. No IV contrast. 
 
CHEST: Heart remains mildly enlarged. Again present are bilateral pleural 
effusions with dependent changes in the lungs. LIVER: Normal 
GALLBLADDER: Removed BILIARY TREE: Normal 
PANCREAS: Fat planes around the pancreas are less distinct. No discrete. Pancreatic fluid collection. There are traces of fluid in the retroperitoneum. SPLEEN: Normal 
ADRENAL GLANDS: Normal 
KIDNEYS/URETERS: There are calcifications in both renal dannielle areas. These appear 
to be vascular. Both kidneys show minimal perinephric stranding similar to the 
prior study. No evidence of hydronephrosis or ureteral stone. Bladder is 
unremarkable. RETROPERITONEUM/AORTA: Atherosclerotic changes again noted knee aorta and its 
branches. No aneurysm or periaortic pathology BOWEL/MESENTERY: Stomach and small bowel show no active process. There is 
extensive colonic diverticulosis in the sigmoid region with scattered 
diverticula elsewhere. No definite diverticulitis. APPENDIX: Not clearly seen PERITONEAL CAVITY: Small amount of free fluid in the posterior pelvis REPRODUCTIVE: Normal 
BONE/TISSUES: No acute abnormality. OTHER: None Impression IMPRESSION: Cardiomegaly with bilateral pleural effusions and dependent changes 
in the lungs. Chronic or recurrent CHF? Indistinctness of the fat planes around the pancreas. There is chronic mild 
perinephric stranding, a nonspecific finding. The changes around the pancreas 
may be secondary to renal process. Correlate with laboratory data to exclude 
low-grade pancreatitis. Diverticulosis without evidence of diverticulitis. Extensive atherosclerotic vascular disease. Small amount of free fluid in the pelvis. This is an abnormal finding in a male 
but nonspecific. It could be secondary to any of the process is discussed above. Vicky Acuna MD 
9/21/2020 
7:55 PM.

## 2020-09-21 NOTE — H&P
History and Physical 
 
 
Chief Complaints: Chief Complaint Patient presents with  Abdominal Pain Subjective:  
 
Meagan Bishop is a 78 y.o. male followed by the [place] and  has a past medical history of Anemia, Chronic kidney disease, Chronic pain, Hypercholesterolemia, and Hypertension. Presents with abdominal pain also noted to be in fluid overload. patient was a poor historian as continually complained about pain. Patient does have a history of chronic pain and opiates restarted. CT scan of the abdomen pelvis was negative did show some evidence of chronic perinephric stranding. With patient's elevated BNP and pleural effusions noted as well as mild hypoxia and leukocytosis patient was referred for admission for fluid overload, end-stage renal disease and abdominal pain Past Medical History:  
Diagnosis Date  Anemia  Chronic kidney disease  Chronic pain   
 back and neck  Hypercholesterolemia  Hypertension Past Surgical History:  
Procedure Laterality Date  HX ARTERIOVENOUS FISTULA  2020  HX CHOLECYSTECTOMY  HX UROLOGICAL    
 bladder surgery Family History Problem Relation Age of Onset  Diabetes Mother Social History Tobacco Use  Smoking status: Former Smoker Packs/day: 0.50 Years: 10.00 Pack years: 5.00 Last attempt to quit:  Years since quittin.7  Smokeless tobacco: Never Used Substance Use Topics  Alcohol use: Not Currently Prior to Admission medications Medication Sig Start Date End Date Taking? Authorizing Provider  
oxyCODONE IR (ROXICODONE) 5 mg immediate release tablet Take 5 mg by mouth every eight (8) hours as needed for Pain. Yes Provider, Historical  
oxyCODONE ER (OxyCONTIN) 15 mg ER tablet Take 15 mg by mouth every twelve (12) hours. Yes Provider, Historical  
 
Allergies Allergen Reactions  Penicillins Itching  Acetaminophen Itching  Codeine Itching Review of Systems: 
Review of Systems Constitutional: Negative for chills, diaphoresis, fatigue and fever. HENT: Negative for congestion, ear pain, postnasal drip, sinus pain and sore throat. Eyes: Negative for pain, discharge and redness. Respiratory: Negative for cough, shortness of breath and wheezing. Cardiovascular: Negative for chest pain and palpitations. Gastrointestinal: Positive for abdominal pain. Negative for constipation, diarrhea, nausea and vomiting. Genitourinary: Negative for dysuria, flank pain, frequency and urgency. Musculoskeletal: Negative for arthralgias and myalgias. Neurological: Negative for dizziness, weakness and headaches. Psychiatric/Behavioral: Negative for agitation and hallucinations. The patient is not nervous/anxious. All other systems reviewed and are negative. Objective:  
 
Vitals: 
Visit Vitals BP (!) 148/69 (BP 1 Location: Left arm, BP Patient Position: At rest;Lying left side) Pulse (!) 105 Temp 99.3 °F (37.4 °C) Resp 22 Ht 5' 9\" (1.753 m) Wt 70.2 kg (154 lb 12.8 oz) SpO2 94% BMI 22.86 kg/m² Physical Exam: 
General: Alert, cooperative, no distress. Head:  Normocephalic, without obvious abnormality, atraumatic. Eyes:  Conjunctivae/corneas clear. Pupils equal, round, reactive to light. Extraocular movements intact. Lungs:  Clear to auscultation bilaterally, no wheezes, crackles Chest wall: No tenderness or deformity. Heart:  Regular rate and rhythm, S1, S2 normal, no murmur, click, rub, or gallop. Abdomen:   Soft, non-tender. Bowel sounds normal.epigastric tenderness Back:  No spine tenderness to palpation Extremities: Extremities normal, atraumatic, no cyanosis or edema. Pulses: Symmetric all extremities. Skin: Skin color, texture, turgor normal.  
Lymph nodes: Cervical nodes normal. 
Neurologic: CNII-XII intact. Normal strength, sensation, and reflexes throughout. Labs: Recent Results (from the past 24 hour(s)) CBC WITH AUTOMATED DIFF Collection Time: 09/21/20  2:15 AM  
Result Value Ref Range WBC 15.4 (H) 4.4 - 11.3 K/uL  
 RBC 2.82 (L) 4.50 - 5.90 M/uL HGB 9.6 (L) 13.5 - 17.5 % HCT 28.9 (L) 41 - 53 % .4 (H) 80 - 100 FL  
 MCH 33.9 31 - 34 PG  
 MCHC 33.1 31.0 - 36.0 g/dL  
 RDW 18.1 (H) 11.5 - 14.5 % PLATELET 266 K/uL MPV 9.6 6.5 - 11.5 FL  
 NRBC 0.0  WBC ABSOLUTE NRBC 0.00 K/uL NEUTROPHILS 93 (H) 42 - 75 % LYMPHOCYTES 2 (L) 20.5 - 51.1 % MONOCYTES 4 1.7 - 9.3 % EOSINOPHILS 1 0.9 - 2.9 % BASOPHILS 0 0.0 - 2.5 % ABS. NEUTROPHILS 14.4 (H) 1.8 - 7.7 K/UL  
 ABS. LYMPHOCYTES 0.3 (L) 1.0 - 4.8 K/UL  
 ABS. MONOCYTES 0.6 0.2 - 2.4 K/UL  
 ABS. EOSINOPHILS 0.1 0.0 - 0.7 K/UL  
 ABS. BASOPHILS 0.0 0.0 - 0.2 K/UL METABOLIC PANEL, COMPREHENSIVE Collection Time: 09/21/20  2:15 AM  
Result Value Ref Range Sodium 131 (L) 136 - 145 mmol/L Potassium 5.1 3.5 - 5.1 mmol/L Chloride 97 97 - 108 mmol/L  
 CO2 20 (L) 21 - 32 mmol/L Anion gap 14 5 - 15 mmol/L Glucose 142 (H) 65 - 100 mg/dL BUN 64 (H) 6 - 20 mg/dL Creatinine 6.99 (H) 0.70 - 1.30 mg/dL BUN/Creatinine ratio 9 (L) 12 - 20 GFR est AA 9 (L) >60 ml/min/1.73m2 GFR est non-AA 8 (L) >60 ml/min/1.73m2 Calcium 10.5 (H) 8.5 - 10.1 mg/dL Bilirubin, total 0.7 0.2 - 1.0 mg/dL AST (SGOT) 33 15 - 37 U/L  
 ALT (SGPT) 28 12 - 78 U/L Alk. phosphatase 211 (H) 45 - 117 U/L Protein, total 8.0 6.4 - 8.2 g/dL Albumin 2.8 (L) 3.5 - 5.0 g/dL Globulin 5.2 (H) 2.0 - 4.0 g/dL A-G Ratio 0.5 (L) 1.1 - 2.2    
TROPONIN I Collection Time: 09/21/20  2:15 AM  
Result Value Ref Range Troponin-I, Qt. 0.51 (H) <0.05 ng/mL PROTHROMBIN TIME + INR Collection Time: 09/21/20  2:15 AM  
Result Value Ref Range Prothrombin time 10.3 9.0 - 11.1 sec INR 1.0 0.9 - 1.1 PTT Collection Time: 09/21/20  2:15 AM  
Result Value Ref Range aPTT 25.8 25.0 - 37.1 sec  
 aPTT, therapeutic range   68 - 109 sec BNP Collection Time: 09/21/20  2:15 AM  
Result Value Ref Range NT pro-BNP 13,151 (H) <125 pg/mL AMYLASE Collection Time: 09/21/20  2:30 AM  
Result Value Ref Range Amylase 83 25 - 115 U/L  
LACTIC ACID Collection Time: 09/21/20  2:30 AM  
Result Value Ref Range Lactic acid 1.4 0.4 - 2.0 mmol/L  
EKG, 12 LEAD, INITIAL Collection Time: 09/21/20  3:55 AM  
Result Value Ref Range Ventricular Rate 105 BPM  
 Atrial Rate 105 BPM  
 P-R Interval 156 ms QRS Duration 103 ms Q-T Interval 342 ms QTC Calculation (Bezet) 453 ms Calculated P Axis 59 degrees Calculated R Axis 2 degrees Calculated T Axis 75 degrees Diagnosis Sinus tachycardia Probable LVH with secondary repol abnrm Confirmed by [facility], [address] ([id number]) on 9/21/2020 4:10:25 PM 
  
TROPONIN I Collection Time: 09/21/20  5:50 AM  
Result Value Ref Range Troponin-I, Qt. 0.65 (H) <0.05 ng/mL IRON PROFILE Collection Time: 09/21/20  6:00 AM  
Result Value Ref Range Iron 18 (L) 35 - 150 ug/dL TIBC 323 250 - 450 ug/dL Iron % saturation 6 (L) 20 - 50 % PHOSPHORUS Collection Time: 09/21/20  6:00 AM  
Result Value Ref Range Phosphorus 7.4 (H) 2.6 - 4.7 mg/dL HEP B SURFACE AB Collection Time: 09/21/20  6:00 AM  
Result Value Ref Range Hepatitis B surface Ab <3.10 mIU/mL Hep B surface Ab Interp. NONREACTIVE NONREACTIVE Imaging: 
Ct Abd Pelv Wo Cont Result Date: 9/21/2020 IMPRESSION: Cardiomegaly with bilateral pleural effusions and dependent changes in the lungs. Chronic or recurrent CHF? Indistinctness of the fat planes around the pancreas. There is chronic mild perinephric stranding, a nonspecific finding. The changes around the pancreas may be secondary to renal process. Correlate with laboratory data to exclude low-grade pancreatitis. Diverticulosis without evidence of diverticulitis.  Extensive atherosclerotic vascular disease. Small amount of free fluid in the pelvis. This is an abnormal finding in a male but nonspecific. It could be secondary to any of the process is discussed above. Xr Chest [facility] Result Date: 9/20/2020 IMPRESSION: Vascular congestion suggests an element of fluid overload. Streaky airspace disease in both lower lung zones and at the left hilum may represent subsegmental atelectasis versus early infiltrate. No pneumothorax. Assessment & Plan:  
 
Kidney disease, chronic, end stage on dialysis ([facility] - ESRD on HD M,W, F 
- nephrology consulted  
- continue dialysis today as patient is noted to be in fluid overload. - restart patient home meds of [place]. TID WM Hypercholesterolemia 
- continue dose of atorvastatin 40mg oral QHS Hypertension 
- monitor vitals q4hrs. - reconcile home meds from the [place] 
- most recent discharge patient was on amlodipine 10 mg, hydralazine as well as Coreg twice daily dosing Fluid overload 
-Patient noted to be have elevated BNP as well as effusions noted on CT and patient states he has done his scheduled dialysis -Nephrology consulted to do dialysis today 
-Monitor closely and fluid restriction Abdominal pain 
-Patient presents with extreme abdominal pain and patient has a history of gastritis CT of the abdomen pelvis shows perinephric stranding possibility of gastritis/pancreatitis patient has had a history  
-Patient does have chronic pain issues and is on twice daily OxyContin 15 mg as well as immediate release oxycodone 5 mg every 4 hours as needed 
-Did place patient on as needed morphine 2 mg for possibility of pancreatitis 
-Lipase levels are pending 
-Also started on Pepcid 20 mg IV twice daily 
-Patient's home pantoprazole 40 mg oral daily 
-Patient also on her fate 1 g oral prior to 2 meals -GI consultation Chronic pain 
-Restarted patient's home oxycodone release and OxyContin Anemia -Hemoglobin is stable and iron profile shows iron deficiency with iron level of 18 
-Patient on ferrous sulfate 305 mg oral twice daily with meals Gastritis 
- history of gastritis in the past and patient out of ordinary pain with negative CT scan 
-Restart patient's home pantoprazole 40 mg daily 
-Carafate 1 g oral 3 times daily with meals 
-Pepcid 20 mg IV twice daily 
-Consultation GI 
  
DVT prophylaxis 
-SCD CODE STATUS: Full code Spent 35 minutes evaluating and coordinating patient's history and physical 
Estimate patient requiring at least a 2 midnight acute care stay Electronically signed by Jose Barba MD on 9/21/2020 at 6:21 PM

## 2020-09-21 NOTE — ED NOTES
Pt has had some intermittent confusion, and has not been answering questions. Pt continues to be very tearful, and will be alert and oriented one minute, then seem to get confused in the next. Pt resting in bed right now.

## 2020-09-21 NOTE — CONSULTS
Discussed lab findings and Troponin results with Dr Angela Fay - cardiologist and he advised that findings are due to CHF and advised dialysis. Discussed with Dr Soledad Tineo- Nephrologist and she advised admission or transfer for dialysis

## 2020-09-21 NOTE — ASSESSMENT & PLAN NOTE
-Patient noted to be have elevated BNP as well as effusions noted on CT and patient states he has done his scheduled dialysis -Nephrology consulted to do dialysis today 
-Monitor closely and fluid restriction

## 2020-09-21 NOTE — ASSESSMENT & PLAN NOTE
- monitor vitals q4hrs. - reconcile home meds from the [place] 
- most recent discharge patient was on amlodipine 10 mg, hydralazine as well as Coreg twice daily dosing - 9/22 vitals signs stable with current meds. Continue to monitor

## 2020-09-21 NOTE — CONSULTS
Nephrology Consult Date: 2020 Consults Subjective 78 y M w/ pmhx remarkable for ESRD on HD, hx of HTN, CHF BIBEMS due to complaints of progressive SOB and HILL. No additional reports such as CP, N/v/D, fever, chills, palpitations, lightheadiness, or LOC. On evaluation, AOX3, NAD and speaking full sentences. Routine labs showed no evidence of hyperkalemia or acid base disturbances. Nephrology consulted of ESRD Past Medical History:  
Diagnosis Date  Anemia  Chronic kidney disease  Chronic pain   
 back and neck  Hypercholesterolemia  Hypertension Past Surgical History:  
Procedure Laterality Date  HX ARTERIOVENOUS FISTULA  2020  HX UROLOGICAL    
 bladder surgery Family History Problem Relation Age of Onset  Diabetes Mother Social History Tobacco Use  Smoking status: Former Smoker Packs/day: 0.50 Years: 10.00 Pack years: 5.00 Last attempt to quit:  Years since quittin.7  Smokeless tobacco: Never Used Substance Use Topics  Alcohol use: Not Currently Current Facility-Administered Medications Medication Dose Route Frequency Provider Last Rate Last Dose  sodium chloride (NS) flush 5-40 mL  5-40 mL IntraVENous Q8H Domingo Justice MD      
 sodium chloride (NS) flush 5-40 mL  5-40 mL IntraVENous PRN Domingo Justice MD      
  
 
Review of Systems Constitutional: Negative. HENT: Negative. Eyes: Negative. Respiratory: Positive for shortness of breath. Cardiovascular: Negative. Genitourinary: Negative. Allergic/Immunologic: Negative. Neurological: Negative. Objective Vital signs for last 24 hours: 
Visit Vitals /61 (BP 1 Location: Right arm, BP Patient Position: At rest;Lying right side) Pulse 99 Temp 100 °F (37.8 °C) Resp 19 SpO2 96% Intake/Output this shift: 
Current Shift: No intake/output data recorded. Last 3 Shifts: No intake/output data recorded. Data Review:  
Recent Results (from the past 24 hour(s)) CBC WITH AUTOMATED DIFF Collection Time: 09/21/20  2:15 AM  
Result Value Ref Range WBC 15.4 (H) 4.4 - 11.3 K/uL  
 RBC 2.82 (L) 4.50 - 5.90 M/uL HGB 9.6 (L) 13.5 - 17.5 % HCT 28.9 (L) 41 - 53 % .4 (H) 80 - 100 FL  
 MCH 33.9 31 - 34 PG  
 MCHC 33.1 31.0 - 36.0 g/dL  
 RDW 18.1 (H) 11.5 - 14.5 % PLATELET 558 K/uL MPV 9.6 6.5 - 11.5 FL  
 NRBC 0.0  WBC ABSOLUTE NRBC 0.00 K/uL NEUTROPHILS 93 (H) 42 - 75 % LYMPHOCYTES 2 (L) 20.5 - 51.1 % MONOCYTES 4 1.7 - 9.3 % EOSINOPHILS 1 0.9 - 2.9 % BASOPHILS 0 0.0 - 2.5 % ABS. NEUTROPHILS 14.4 (H) 1.8 - 7.7 K/UL  
 ABS. LYMPHOCYTES 0.3 (L) 1.0 - 4.8 K/UL  
 ABS. MONOCYTES 0.6 0.2 - 2.4 K/UL  
 ABS. EOSINOPHILS 0.1 0.0 - 0.7 K/UL  
 ABS. BASOPHILS 0.0 0.0 - 0.2 K/UL METABOLIC PANEL, COMPREHENSIVE Collection Time: 09/21/20  2:15 AM  
Result Value Ref Range Sodium 131 (L) 136 - 145 mmol/L Potassium 5.1 3.5 - 5.1 mmol/L Chloride 97 97 - 108 mmol/L  
 CO2 20 (L) 21 - 32 mmol/L Anion gap 14 5 - 15 mmol/L Glucose 142 (H) 65 - 100 mg/dL BUN 64 (H) 6 - 20 mg/dL Creatinine 6.99 (H) 0.70 - 1.30 mg/dL BUN/Creatinine ratio 9 (L) 12 - 20 GFR est AA 9 (L) >60 ml/min/1.73m2 GFR est non-AA 8 (L) >60 ml/min/1.73m2 Calcium 10.5 (H) 8.5 - 10.1 mg/dL Bilirubin, total 0.7 0.2 - 1.0 mg/dL AST (SGOT) 33 15 - 37 U/L  
 ALT (SGPT) 28 12 - 78 U/L Alk. phosphatase 211 (H) 45 - 117 U/L Protein, total 8.0 6.4 - 8.2 g/dL Albumin 2.8 (L) 3.5 - 5.0 g/dL Globulin 5.2 (H) 2.0 - 4.0 g/dL A-G Ratio 0.5 (L) 1.1 - 2.2    
TROPONIN I Collection Time: 09/21/20  2:15 AM  
Result Value Ref Range Troponin-I, Qt. 0.51 (H) <0.05 ng/mL PROTHROMBIN TIME + INR Collection Time: 09/21/20  2:15 AM  
Result Value Ref Range Prothrombin time 10.3 9.0 - 11.1 sec INR 1.0 0.9 - 1.1 PTT Collection Time: 09/21/20  2:15 AM  
Result Value Ref Range aPTT 25.8 25.0 - 37.1 sec  
 aPTT, therapeutic range   68 - 109 sec BNP Collection Time: 09/21/20  2:15 AM  
Result Value Ref Range NT pro-BNP 13,151 (H) <125 pg/mL AMYLASE Collection Time: 09/21/20  2:30 AM  
Result Value Ref Range Amylase 83 25 - 115 U/L  
LACTIC ACID Collection Time: 09/21/20  2:30 AM  
Result Value Ref Range Lactic acid 1.4 0.4 - 2.0 mmol/L  
EKG, 12 LEAD, INITIAL Collection Time: 09/21/20  3:55 AM  
Result Value Ref Range Ventricular Rate 105 BPM  
 Atrial Rate 105 BPM  
 P-R Interval 156 ms QRS Duration 103 ms Q-T Interval 342 ms QTC Calculation (Bezet) 453 ms Calculated P Axis 59 degrees Calculated R Axis 2 degrees Calculated T Axis 75 degrees Diagnosis Sinus tachycardia Probable LVH with secondary repol abnrm TROPONIN I Collection Time: 09/21/20  5:50 AM  
Result Value Ref Range Troponin-I, Qt. 0.65 (H) <0.05 ng/mL Physical Exam 
HENT:  
   Head: Atraumatic. Neck: Musculoskeletal: Neck supple. Cardiovascular:  
   Rate and Rhythm: Normal rate. Pulses: Normal pulses. Heart sounds: Normal heart sounds. Pulmonary:  
   Effort: No respiratory distress. Breath sounds: Normal breath sounds. Skin: 
   General: Skin is warm. Neurological:  
   Mental Status: He is alert and oriented to person, place, and time. Mental status is at baseline. Current Facility-Administered Medications:  
  sodium chloride (NS) flush 5-40 mL, 5-40 mL, IntraVENous, Q8H, Zunilda Cristobal MD 
  sodium chloride (NS) flush 5-40 mL, 5-40 mL, IntraVENous, PRN, Zunilda Cristobal MD 
 
Assessment Plan 1. ESRD on HD - dialysis indicated today for metabolic clearance and volume management 2. CHF c/b Fluid overload - will max'd UF during dialysis 3. Secondary hyperparathryroidism - new phosp levels requested 4. Anemia of chronic disease - new iron studies requested 5. Hep B serologies non reactive 6. COVID-19 PUI  - as per hospital protocol and management Signed By: Angela Etienne MD   
 September 21, 2020

## 2020-09-21 NOTE — ED NOTES
Dialysis acces cleaned and dressed using sterile technique. No dressing was applied prior to arrival to ED.

## 2020-09-21 NOTE — ROUTINE PROCESS
TRANSFER - IN REPORT: 
 
Verbal report received from [address] [place] [address] on Allan Higginbotham  being received from EMERGENCY for routine progression of care Report consisted of patients Situation, Background, Assessment and  
Recommendations(SBAR). Information from the following report(s) SBAR was reviewed with the receiving nurse. Opportunity for questions and clarification was provided. Assessment completed upon patients arrival to unit and care assumed.

## 2020-09-21 NOTE — ASSESSMENT & PLAN NOTE
- history of gastritis in the past and patient out of ordinary pain with negative CT scan 
-Restart patient's home pantoprazole 40 mg daily 
-Pepcid 20 mg IV twice daily 
-Consultation GI appreciated - 9/22 patient had a vomiting episode small, does have some abdominal pain but will obtain kub and evaluate  
-/23 KUB did show constipation but patient did have bowel movement will advance diet

## 2020-09-21 NOTE — ED NOTES
Report called to [facility]. Nurse unavailable at time. Will attempt report again. Nurse received SBAR prior, no further questions. Care assumed by nurse.

## 2020-09-22 NOTE — DIALYSIS
[facility]  [phone] Vitals   Pre   Post   Assessment   Pre   Post    
Temp  99.3  99.5 LOC  Oriented to self Oriented to self HR   103 108 Lungs   Diminished on room air  diminished on room air B/P   152/67 134/65 Cardiac   ST, b/p wnl  ST, b/p wnl Resp   22 20 Skin   intact  intact Pain level  Neck pain, nurse giving pain meds Neck pain, nurse made aware Edema  +1 generalized +1 generalized Orders: Duration:   Start:    1539 End:   1910 Total:   3.5hrs Dialyzer:   Dialyzer/Set Up Inspection: Sharath Moeller (09/21/20 1539) K Bath:   Dialysate K (mEq/L): 3 (09/21/20 1539) Ca Bath:   Dialysate CA (mEq/L): 3.0 (09/21/20 1539) Na/Bicarb:   Dialysate NA (mEq/L): 137 (09/21/20 1539) Target Fluid Removal:   Goal/Amount of Fluid to Remove (mL): 3000 mL (09/21/20 1539) Access Type & Location:   RT Subclavian perma-cath. Each catheter limb disinfected per p&p, caps removed, hubs disinfected per p&p. Blood aspirated and lines flushed without any issues. Good blood flow, no s/s of infection noted Labs Obtained/Reviewed Critical Results Called   Date when labs were drawn- 
Hgb-   
HGB Date Value Ref Range Status 09/21/2020 9.6 (L) 13.5 - 17.5 % Final  
 
K-   
Potassium Date Value Ref Range Status 09/21/2020 5.1 3.5 - 5.1 mmol/L Final  
 
Ca-  
Calcium Date Value Ref Range Status 09/21/2020 10.5 (H) 8.5 - 10.1 mg/dL Final  
 
Bun-  
BUN Date Value Ref Range Status 09/21/2020 64 (H) 6 - 20 mg/dL Final  
 
Creat-  
Creatinine Date Value Ref Range Status 09/21/2020 6.99 (H) 0.70 - 1.30 mg/dL Final  
 
  
Medications/ Blood Products Given Name   Dose   Route and Time None ordered Blood Volume Processed (BVP):    66.6 Net Fluid Removed:  3kg Comments Time Out Done: 1430 Primary Nurse Rpt Pre:Estefania Lilton Raúl lpn Primary Nurse Rpt Post:Estefania [employer] Pt Education:ESRD Care Plan:ESRD Tx Summary: 1539 dialysis started after consent obtained 1910 Dialysis completed and blood rinsed back. Each dialysis catheter limb disinfected per p&p, blood returned per p&p, each dialysis hub disinfected per p&p, post dialysis catheter dwell instilled per order, and caps applied. Pt stable Admiting Diagnosis:Fluid overload Pt's previous clinic- 
Consent signed - Informed Consent Verified: Yes (09/21/20 6568) Hepatitis Status-  NEG AG 9/21/20 Machine #- Machine Number: [id number] (09/21/20 1539) Telemetry status-ST Pre-dialysis wt. -

## 2020-09-22 NOTE — PROGRESS NOTES
Physical Therapy Screening: 
Physical Therapy consult is not indicated at this time. A screening was performed on this patient with on call provider, who states patient does not present with any deficits which would call for further physical therapy screening or evaluation. Please consult physical therapy if any therapy needs arise. Thank you.

## 2020-09-22 NOTE — PROGRESS NOTES
Progress Note Date:9/22/2020       KUKV:018/92 Patient Jatinder Yin     YOB: 1950     Age:69 y.o. Subjective Leonela Perdomo is a 78 y.o. male followed by the [place] and  has a past medical history of Anemia, Chronic kidney disease, Chronic pain, Hypercholesterolemia, and Hypertension. Presents with abdominal pain also noted to be in fluid overload. patient was a poor historian as continually complained about pain. Patient does have a history of chronic pain and opiates restarted. CT scan of the abdomen pelvis was negative did show some evidence of chronic perinephric stranding. With patient's elevated BNP and pleural effusions noted as well as mild hypoxia and leukocytosis patient was referred for admission for fluid overload, end-stage renal disease and abdominal pain. Patient seen and evalauted. Sitting in side of bed. Patient had small vomiting this AM but later was able to tolerate diet. GI recommended to start IV abx for possible infectious cause of abdominal pain. Lipase level is normal.  
 
Review of Systems Objective Vitals Last 24 Hours: 
Patient Vitals for the past 24 hrs: 
 Temp Pulse Resp BP SpO2  
09/22/20 1243 100 °F (37.8 °C) 86 18 (!) 105/48 94 % 09/22/20 0821 (!) 100.5 °F (38.1 °C) 91 18 (!) 132/45 98 %  
09/22/20 0415 98.1 °F (36.7 °C) 99 18 130/61 97 % 09/22/20 0400  99     
09/22/20 0000  (!) 101     
09/21/20 2337 97.6 °F (36.4 °C) (!) 108 18 125/75 97 % 09/21/20 2000  (!) 110     
09/21/20 1958 99.5 °F (37.5 °C) (!) 110 20 (!) 135/52 97 % 09/21/20 1910 99.5 °F (37.5 °C) (!) 108 20 134/65   
09/21/20 1900  (!) 101 20 130/62   
09/21/20 1845  (!) 101 20 (!) 136/56   
09/21/20 1830  (!) 101 20 137/62   
09/21/20 1815  (!) 111 22 (!) 159/61   
09/21/20 1800  (!) 106 22 (!) 141/59   
09/21/20 1745  (!) 107 22 (!) 153/61   
09/21/20 1730  (!) 107 20 136/64   
09/21/20 1715  (!) 106 22 (!) 146/59  09/21/20 1700  (!) 107 20 (!) 132/50   
09/21/20 1645  (!) 108 20 (!) 133/56   
09/21/20 1630  (!) 108 22 (!) 132/50   
09/21/20 1615  100 20 (!) 128/53   
09/21/20 1603 99.3 °F (37.4 °C) (!) 105 22 (!) 148/69 94 % 09/21/20 1600  (!) 108 20 (!) 152/63  I/O (24Hr): Intake/Output Summary (Last 24 hours) at 9/22/2020 1551 Last data filed at 9/21/2020 1910 Gross per 24 hour Intake  Output 3000 ml Net -3000 ml Physical Exam: 
General: Alert, cooperative, no distress, appears stated age. Head:  Normocephalic, without obvious abnormality, atraumatic. Eyes:  Conjunctivae/corneas clear. Pupils equal, round, reactive to light. Extraocular movements intact. Lungs:  Clear to auscultation bilaterally. no wheeze, rales, crackles, rhonchi Chest wall: No tenderness or deformity. Heart:  Regular rate and rhythm, S1, S2 normal, no murmur, click, rub or gallop. Abdomen:  Soft, minimal -tender. Bowel sounds normal. No masses,  No organomegaly. Extremities: Extremities normal, atraumatic, no cyanosis or edema. Pulses: 2+ and symmetric all extremities. Skin: Skin color, texture, turgor normal. No rashes or lesions Neurologic: Awake, Alert, oriented. No obvious gross sensory or motor deficits Medications Current Facility-Administered Medications Medication Dose Route Frequency  metroNIDAZOLE (FLAGYL) IVPB premix 500 mg  500 mg IntraVENous Q8H  
 [START ON 9/23/2020] levoFLOXacin (LEVAQUIN) 500 mg in D5W IVPB  500 mg IntraVENous Q48H  
 [START ON 9/23/2020] famotidine (PF) (PEPCID) 20 mg in 0.9% sodium chloride 10 mL injection  20 mg IntraVENous DAILY  morphine injection 1 mg  1 mg IntraVENous Q4H PRN  
 sodium chloride (NS) flush 5-40 mL  5-40 mL IntraVENous Q8H  
 sodium chloride (NS) flush 5-40 mL  5-40 mL IntraVENous PRN  
 oxyCODONE IR (ROXICODONE) tablet 5 mg  5 mg Oral Q8H PRN  
 oxyCODONE ER (OxyCONTIN) tablet 10 mg  10 mg Oral Q12H  ferrous sulfate tablet 325 mg  1 Tab Oral BID WITH MEALS  pantoprazole (PROTONIX) tablet 40 mg  40 mg Oral ACB  amLODIPine (NORVASC) tablet 10 mg  10 mg Oral DAILY  atorvastatin (LIPITOR) tablet 40 mg  40 mg Oral QHS  carvediloL (COREG) tablet 25 mg  25 mg Oral BID WITH MEALS  docusate sodium (COLACE) capsule 100 mg  100 mg Oral DAILY  sevelamer carbonate (RENVELA) tab 1,600 mg  1,600 mg Oral TID WITH MEALS  
 hydrALAZINE (APRESOLINE) tablet 25 mg  25 mg Oral TID  sodium bicarbonate tablet 1,300 mg  1,300 mg Oral TID  mirtazapine (REMERON) tablet 15 mg  15 mg Oral QHS Allergies Penicillins; Acetaminophen; and Codeine Labs/Imaging/Diagnostics Labs: 
Recent Results (from the past 48 hour(s)) CBC WITH AUTOMATED DIFF Collection Time: 09/21/20  2:15 AM  
Result Value Ref Range WBC 15.4 (H) 4.4 - 11.3 K/uL  
 RBC 2.82 (L) 4.50 - 5.90 M/uL HGB 9.6 (L) 13.5 - 17.5 % HCT 28.9 (L) 41 - 53 % .4 (H) 80 - 100 FL  
 MCH 33.9 31 - 34 PG  
 MCHC 33.1 31.0 - 36.0 g/dL  
 RDW 18.1 (H) 11.5 - 14.5 % PLATELET 055 K/uL MPV 9.6 6.5 - 11.5 FL  
 NRBC 0.0  WBC ABSOLUTE NRBC 0.00 K/uL NEUTROPHILS 93 (H) 42 - 75 % LYMPHOCYTES 2 (L) 20.5 - 51.1 % MONOCYTES 4 1.7 - 9.3 % EOSINOPHILS 1 0.9 - 2.9 % BASOPHILS 0 0.0 - 2.5 % ABS. NEUTROPHILS 14.4 (H) 1.8 - 7.7 K/UL  
 ABS. LYMPHOCYTES 0.3 (L) 1.0 - 4.8 K/UL  
 ABS. MONOCYTES 0.6 0.2 - 2.4 K/UL  
 ABS. EOSINOPHILS 0.1 0.0 - 0.7 K/UL  
 ABS. BASOPHILS 0.0 0.0 - 0.2 K/UL METABOLIC PANEL, COMPREHENSIVE Collection Time: 09/21/20  2:15 AM  
Result Value Ref Range Sodium 131 (L) 136 - 145 mmol/L Potassium 5.1 3.5 - 5.1 mmol/L Chloride 97 97 - 108 mmol/L  
 CO2 20 (L) 21 - 32 mmol/L Anion gap 14 5 - 15 mmol/L Glucose 142 (H) 65 - 100 mg/dL BUN 64 (H) 6 - 20 mg/dL Creatinine 6.99 (H) 0.70 - 1.30 mg/dL  BUN/Creatinine ratio 9 (L) 12 - 20    
 GFR est AA 9 (L) >60 ml/min/1.73m2 GFR est non-AA 8 (L) >60 ml/min/1.73m2 Calcium 10.5 (H) 8.5 - 10.1 mg/dL Bilirubin, total 0.7 0.2 - 1.0 mg/dL AST (SGOT) 33 15 - 37 U/L  
 ALT (SGPT) 28 12 - 78 U/L Alk. phosphatase 211 (H) 45 - 117 U/L Protein, total 8.0 6.4 - 8.2 g/dL Albumin 2.8 (L) 3.5 - 5.0 g/dL Globulin 5.2 (H) 2.0 - 4.0 g/dL A-G Ratio 0.5 (L) 1.1 - 2.2    
TROPONIN I Collection Time: 09/21/20  2:15 AM  
Result Value Ref Range Troponin-I, Qt. 0.51 (H) <0.05 ng/mL PROTHROMBIN TIME + INR Collection Time: 09/21/20  2:15 AM  
Result Value Ref Range Prothrombin time 10.3 9.0 - 11.1 sec INR 1.0 0.9 - 1.1 PTT Collection Time: 09/21/20  2:15 AM  
Result Value Ref Range aPTT 25.8 25.0 - 37.1 sec  
 aPTT, therapeutic range   68 - 109 sec BNP Collection Time: 09/21/20  2:15 AM  
Result Value Ref Range NT pro-BNP 13,151 (H) <125 pg/mL AMYLASE Collection Time: 09/21/20  2:30 AM  
Result Value Ref Range Amylase 83 25 - 115 U/L  
LACTIC ACID Collection Time: 09/21/20  2:30 AM  
Result Value Ref Range Lactic acid 1.4 0.4 - 2.0 mmol/L  
EKG, 12 LEAD, INITIAL Collection Time: 09/21/20  3:55 AM  
Result Value Ref Range Ventricular Rate 105 BPM  
 Atrial Rate 105 BPM  
 P-R Interval 156 ms QRS Duration 103 ms Q-T Interval 342 ms QTC Calculation (Bezet) 453 ms Calculated P Axis 59 degrees Calculated R Axis 2 degrees Calculated T Axis 75 degrees Diagnosis Sinus tachycardia Probable LVH with secondary repol abnrm Confirmed by [facility], [address] ([id number]) on 9/21/2020 4:10:25 PM 
  
TROPONIN I Collection Time: 09/21/20  5:50 AM  
Result Value Ref Range Troponin-I, Qt. 0.65 (H) <0.05 ng/mL LIPASE Collection Time: 09/21/20  5:50 AM  
Result Value Ref Range Lipase 59 (L) 73 - 393 U/L  
IRON PROFILE Collection Time: 09/21/20  6:00 AM  
Result Value Ref Range Iron 18 (L) 35 - 150 ug/dL TIBC 323 250 - 450 ug/dL Iron % saturation 6 (L) 20 - 50 % PHOSPHORUS Collection Time: 09/21/20  6:00 AM  
Result Value Ref Range Phosphorus 7.4 (H) 2.6 - 4.7 mg/dL HEP B SURFACE AB Collection Time: 09/21/20  6:00 AM  
Result Value Ref Range Hepatitis B surface Ab <3.10 mIU/mL Hep B surface Ab Interp. NONREACTIVE NONREACTIVE  
LIPASE Collection Time: 09/22/20  6:02 AM  
Result Value Ref Range Lipase 48 (L) 73 - 523 U/L  
METABOLIC PANEL, COMPREHENSIVE Collection Time: 09/22/20  6:02 AM  
Result Value Ref Range Sodium 135 (L) 136 - 145 mmol/L Potassium 4.9 3.5 - 5.1 mmol/L Chloride 99 97 - 108 mmol/L  
 CO2 26 21 - 32 mmol/L Anion gap 10 5 - 15 mmol/L Glucose 121 (H) 65 - 100 mg/dL BUN 44 (H) 6 - 20 mg/dL Creatinine 5.29 (H) 0.70 - 1.30 mg/dL BUN/Creatinine ratio 8 (L) 12 - 20 GFR est AA 13 (L) >60 ml/min/1.73m2 GFR est non-AA 11 (L) >60 ml/min/1.73m2 Calcium 9.8 8.5 - 10.1 mg/dL Bilirubin, total 0.7 0.2 - 1.0 mg/dL AST (SGOT) 28 15 - 37 U/L  
 ALT (SGPT) 21 12 - 78 U/L Alk. phosphatase 154 (H) 45 - 117 U/L Protein, total 7.4 6.4 - 8.2 g/dL Albumin 2.5 (L) 3.5 - 5.0 g/dL Globulin 4.9 (H) 2.0 - 4.0 g/dL A-G Ratio 0.5 (L) 1.1 - 2.2    
CBC WITH AUTOMATED DIFF Collection Time: 09/22/20  6:02 AM  
Result Value Ref Range WBC 8.5 4.4 - 11.3 K/uL  
 RBC 2.73 (L) 4.50 - 5.90 M/uL HGB 9.2 (L) 13.5 - 17.5 % HCT 28.0 (L) 41 - 53 % .8 (H) 80 - 100 FL  
 MCH 33.7 31 - 34 PG  
 MCHC 32.8 31.0 - 36.0 g/dL  
 RDW 18.1 (H) 11.5 - 14.5 % PLATELET 035 K/uL MPV 9.8 6.5 - 11.5 FL  
 NRBC 0.0  WBC ABSOLUTE NRBC 0.00 K/uL NEUTROPHILS 85 (H) 42 - 75 % LYMPHOCYTES 3 (L) 20.5 - 51.1 % MONOCYTES 10 (H) 1.7 - 9.3 % EOSINOPHILS 1 0.9 - 2.9 % BASOPHILS 1 0.0 - 2.5 % ABS. NEUTROPHILS 7.3 1.8 - 7.7 K/UL  
 ABS. LYMPHOCYTES 0.3 (L) 1.0 - 4.8 K/UL  
 ABS. MONOCYTES 0.8 0.2 - 2.4 K/UL ABS. EOSINOPHILS 0.1 0.0 - 0.7 K/UL  
 ABS. BASOPHILS 0.1 0.0 - 0.2 K/UL Imaging: 
Ct Abd Pelv Wo Cont Result Date: 9/21/2020 IMPRESSION: Cardiomegaly with bilateral pleural effusions and dependent changes in the lungs. Chronic or recurrent CHF? Indistinctness of the fat planes around the pancreas. There is chronic mild perinephric stranding, a nonspecific finding. The changes around the pancreas may be secondary to renal process. Correlate with laboratory data to exclude low-grade pancreatitis. Diverticulosis without evidence of diverticulitis. Extensive atherosclerotic vascular disease. Small amount of free fluid in the pelvis. This is an abnormal finding in a male but nonspecific. It could be secondary to any of the process is discussed above. Xr Chest Leitha Sor Result Date: 9/20/2020 IMPRESSION: Vascular congestion suggests an element of fluid overload. Streaky airspace disease in both lower lung zones and at the left hilum may represent subsegmental atelectasis versus early infiltrate. No pneumothorax. Assessment//Plan Problem List: 
Hospital Problems  Date Reviewed: 8/21/2020 Codes Class Noted POA  
 ESRD (end stage renal disease) [facility] ICD-10-CM: N18.6, Z99.2 ICD-9-CM: 585.6, V45.11  9/21/2020 Unknown Fluid overload ICD-10-CM: E87.70 ICD-9-CM: 276.69  9/21/2020 Unknown Abdominal pain ICD-10-CM: R10.9 ICD-9-CM: 789.00  9/21/2020 Unknown Anemia ICD-10-CM: D64.9 ICD-9-CM: 285.9  9/21/2020 Unknown Chronic pain ICD-10-CM: G89.29 ICD-9-CM: 338.29  9/21/2020 Unknown Gastritis ICD-10-CM: K29.70 ICD-9-CM: 535.50  9/21/2020 Unknown Kidney disease, chronic, end stage on dialysis ([facility] ICD-10-CM: N18.6, Z99.2 ICD-9-CM: 585.6, V45.11  1/1/2020 Yes Hypertension ICD-10-CM: I10 
ICD-9-CM: 401.9  1/1/2020 Yes Hypercholesterolemia ICD-10-CM: E78.00 ICD-9-CM: 272.0  1/1/2020 Yes Kidney disease, chronic, end stage on dialysis ([facility] - ESRD on HD M,W, F 
- nephrology consulted  
- continue dialysis today as patient is noted to be in fluid overload. - restart patient home meds of Renvela. TID WM Hypercholesterolemia 
- continue dose of atorvastatin 40mg oral QHS Hypertension 
- monitor vitals q4hrs. - reconcile home meds from the [place] 
- most recent discharge patient was on amlodipine 10 mg, hydralazine as well as Coreg twice daily dosing Fluid overload 
-Patient noted to be have elevated BNP as well as effusions noted on CT and patient states he has done his scheduled dialysis -Nephrology consulted to do dialysis today 
-Monitor closely and fluid restriction Abdominal pain 
-Patient presents with extreme abdominal pain and patient has a history of gastritis CT of the abdomen pelvis shows perinephric stranding possibility of gastritis/pancreatitis patient has had a history  
-Patient does have chronic pain issues and is on twice daily OxyContin 15 mg as well as immediate release oxycodone 5 mg every 4 hours as needed 
-Did place patient on as needed morphine 2 mg which was decreased to 1mg  
-Lipase levels normal range 
-Also started on Pepcid 20 mg IV twice daily 
-Patient's home pantoprazole 40 mg oral daily 
-Patient also on carafate 1 g oral prior to prior  Meals but was recommended to be used cautiosly so will dc  
-GI consultation appreciated. Recommended to treat for possible infectious source as CT abdomen showed diverituclosis but patient pain is more gastric in nature but start metrondiazole and levaqquin dose as per pharmacy Chronic pain 
-Restarted patient's home oxycodone release and OxyContin Anemia 
-Hemoglobin is stable and iron profile shows iron deficiency with iron level of 18 
-Patient on ferrous sulfate 325 mg oral twice daily with meals Gastritis 
- history of gastritis in the past and patient out of ordinary pain with negative CT scan -Restart patient's home pantoprazole 40 mg daily 
-Pepcid 20 mg IV twice daily 
-Consultation GI appreciated - 9/22 patient had a vomiting episode small, does have some abdominal pain but will obtain kub and evaluate Electronically signed by Sandra Pollock MD on 9/22/2020 at 3:51 PM

## 2020-09-22 NOTE — PROGRESS NOTES
Pt is NPO for the current time due to c/o abdominal pain. MD made aware will follow-up regarding diet

## 2020-09-23 PROBLEM — K59.00 CONSTIPATION: Status: ACTIVE | Noted: 2020-01-01

## 2020-09-23 NOTE — CONSULTS
Nephrology Consult Date: 2020 Consults Subjective 78 y M w/ pmhx remarkable for ESRD on HD, hx of HTN, CHF BIBEMS due to complaints of progressive SOB and HILL. Underwent HD session on . Today, patient is noted to be agitated, though non verbal. He responds to name though repels me during exam attempts. His most recent KUB xray has shown severe constipation. His BP has been low. Being dialyzed today. Past Medical History:  
Diagnosis Date  Anemia  Chronic kidney disease  Chronic pain   
 back and neck  Hypercholesterolemia  Hypertension Past Surgical History:  
Procedure Laterality Date  HX ARTERIOVENOUS FISTULA  2020  HX CHOLECYSTECTOMY  HX UROLOGICAL    
 bladder surgery Family History Problem Relation Age of Onset  Diabetes Mother Social History Tobacco Use  Smoking status: Former Smoker Packs/day: 0.50 Years: 10.00 Pack years: 5.00 Last attempt to quit:  Years since quittin.7  Smokeless tobacco: Never Used Substance Use Topics  Alcohol use: Not Currently Current Facility-Administered Medications Medication Dose Route Frequency Provider Last Rate Last Dose  metroNIDAZOLE (FLAGYL) IVPB premix 500 mg  500 mg IntraVENous Q8H To Sharif  mL/hr at 20 0907 500 mg at 20 5454  famotidine (PF) (PEPCID) 20 mg in 0.9% sodium chloride 10 mL injection  20 mg IntraVENous DAILY Bob Mills MD   20 mg at 20 0849  brimonidine (ALPHAGAN) 0.2 % ophthalmic solution 1 Drop  1 Drop Both Eyes TID Bob Mills MD   1 Drop at 20 1565  dorzolamide (TRUSOPT) 2 % ophthalmic solution 1 Drop  1 Drop Both Eyes TID Bob Mills MD   1 Drop at 20 3194  docusate sodium (COLACE) capsule 100 mg  100 mg Oral BID Amy MUÑOZ MD   100 mg at 20 3233  polyethylene glycol (MIRALAX) packet 17 g  17 g Oral DAILY Chante Jocelyne Kamara MD      
 sodium chloride (NS) flush 5-40 mL  5-40 mL IntraVENous Q8H Myra Mcrae MD   10 mL at 09/23/20 7690  sodium chloride (NS) flush 5-40 mL  5-40 mL IntraVENous PRN Myra Mcrae MD   10 mL at 09/22/20 2129  
 oxyCODONE IR (ROXICODONE) tablet 5 mg  5 mg Oral Q8H PRN Tyrese Gutierrez MD   5 mg at 09/23/20 9629  oxyCODONE ER (OxyCONTIN) tablet 10 mg  10 mg Oral Q12H Tyrese Gutierrez MD   10 mg at 09/23/20 4878  ferrous sulfate tablet 325 mg  1 Tab Oral BID WITH MEALS Tyrese Gutierrez MD   325 mg at 09/23/20 8594  pantoprazole (PROTONIX) tablet 40 mg  40 mg Oral ACB To Sharif MD   40 mg at 09/22/20 1038  amLODIPine (NORVASC) tablet 10 mg  10 mg Oral DAILY Tyrese Gutierrez MD   10 mg at 09/23/20 5181  atorvastatin (LIPITOR) tablet 40 mg  40 mg Oral QHS Tyrese Gutierrez MD   40 mg at 09/22/20 2107  carvediloL (COREG) tablet 25 mg  25 mg Oral BID WITH MEALS Tyrese Gutierrez MD   25 mg at 09/23/20 9818  sevelamer carbonate (RENVELA) tab 1,600 mg  1,600 mg Oral TID WITH MEALS To Sharif MD   1,600 mg at 09/23/20 7493  hydrALAZINE (APRESOLINE) tablet 25 mg  25 mg Oral TID Tyrese Gutierrez MD   25 mg at 09/23/20 9098  sodium bicarbonate tablet 1,300 mg  1,300 mg Oral TID Tyrese Gutierrez MD   1,300 mg at 09/23/20 9701  mirtazapine (REMERON) tablet 15 mg  15 mg Oral QHS Tyrese Gutierrez MD   15 mg at 09/22/20 2108 Objective Vital signs for last 24 hours: 
Visit Vitals BP (!) 99/46 (BP 1 Location: Right arm, BP Patient Position: Other (comment)) Pulse 76 Temp 98.8 °F (37.1 °C) Resp 21 Ht 5' 9\" (1.753 m) Wt 70.2 kg (154 lb 12.8 oz) SpO2 94% BMI 22.86 kg/m² Intake/Output this shift: 
Current Shift: No intake/output data recorded. Last 3 Shifts: 09/21 1901 - 09/23 0700 In: -  
Out: 3000 Data Review:  
Recent Results (from the past 24 hour(s)) CBC WITH AUTOMATED DIFF  
 Collection Time: 09/23/20  6:12 AM  
Result Value Ref Range WBC 9.2 4.4 - 11.3 K/uL  
 RBC 2.75 (L) 4.50 - 5.90 M/uL HGB 9.3 (L) 13.5 - 17.5 % HCT 28.0 (L) 41 - 53 % .7 (H) 80 - 100 FL  
 MCH 33.7 31 - 34 PG  
 MCHC 33.1 31.0 - 36.0 g/dL  
 RDW 17.7 (H) 11.5 - 14.5 % PLATELET 365 K/uL MPV 9.3 6.5 - 11.5 FL  
 NRBC 0.0  WBC ABSOLUTE NRBC 0.00 K/uL NEUTROPHILS 90 (H) 42 - 75 % LYMPHOCYTES 2 (L) 20.5 - 51.1 % MONOCYTES 8 1.7 - 9.3 % EOSINOPHILS 0 (L) 0.9 - 2.9 % BASOPHILS 0 0.0 - 2.5 % ABS. NEUTROPHILS 8.2 (H) 1.8 - 7.7 K/UL  
 ABS. LYMPHOCYTES 0.2 (L) 1.0 - 4.8 K/UL  
 ABS. MONOCYTES 0.8 0.2 - 2.4 K/UL  
 ABS. EOSINOPHILS 0.0 0.0 - 0.7 K/UL  
 ABS. BASOPHILS 0.0 0.0 - 0.2 K/UL METABOLIC PANEL, BASIC Collection Time: 09/23/20  6:12 AM  
Result Value Ref Range Sodium 136 136 - 145 mmol/L Potassium 5.5 (H) 3.5 - 5.1 mmol/L Chloride 99 97 - 108 mmol/L  
 CO2 25 21 - 32 mmol/L Anion gap 12 5 - 15 mmol/L Glucose 146 (H) 65 - 100 mg/dL BUN 76 (H) 6 - 20 mg/dL Creatinine 7.52 (H) 0.70 - 1.30 mg/dL BUN/Creatinine ratio 10 (L) 12 - 20 GFR est AA 9 (L) >60 ml/min/1.73m2 GFR est non-AA 7 (L) >60 ml/min/1.73m2 Calcium 10.2 (H) 8.5 - 10.1 mg/dL Physical Exam 
HENT:  
   Head: Atraumatic. Neck: Musculoskeletal: Neck supple. Cardiovascular:  
   Rate and Rhythm: Normal rate. Pulses: Normal pulses. Heart sounds: Normal heart sounds. Pulmonary:  
   Effort: No respiratory distress. Breath sounds: Normal breath sounds. Abdominal:  
   Tenderness: There is abdominal tenderness. There is guarding. Skin: 
   General: Skin is warm. Neurological:  
   Mental Status: He is alert and oriented to person, place, and time. Mental status is at baseline.   
 
 
 
Current Facility-Administered Medications:  
  metroNIDAZOLE (FLAGYL) IVPB premix 500 mg, 500 mg, IntraVENous, Q8H, Edilson Brambila MD, Last Rate: 100 mL/hr at 09/23/20 0907, 500 mg at 09/23/20 5966   famotidine (PF) (PEPCID) 20 mg in 0.9% sodium chloride 10 mL injection, 20 mg, IntraVENous, DAILY, To Sharif MD, 20 mg at 09/23/20 8885   brimonidine (ALPHAGAN) 0.2 % ophthalmic solution 1 Drop, 1 Drop, Both Eyes, TID, Petra Sharif MD, 1 Drop at 09/23/20 4048   dorzolamide (TRUSOPT) 2 % ophthalmic solution 1 Drop, 1 Drop, Both Eyes, TID, Petra Sharif MD, 1 Drop at 09/23/20 3368   docusate sodium (COLACE) capsule 100 mg, 100 mg, Oral, BID, To Sharif MD, 100 mg at 09/23/20 1706   polyethylene glycol (MIRALAX) packet 17 g, 17 g, Oral, DAILY, To Sharif MD 
  sodium chloride (NS) flush 5-40 mL, 5-40 mL, IntraVENous, Q8H, Chirag Jaime MD, 10 mL at 09/23/20 1602   sodium chloride (NS) flush 5-40 mL, 5-40 mL, IntraVENous, PRN, Chirag Jaime MD, 10 mL at 09/22/20 2129 
  oxyCODONE IR (ROXICODONE) tablet 5 mg, 5 mg, Oral, Q8H PRN, Edilson Brambila MD, 5 mg at 09/23/20 9489   oxyCODONE ER (OxyCONTIN) tablet 10 mg, 10 mg, Oral, Q12H, To Sharif MD, 10 mg at 09/23/20 5790   ferrous sulfate tablet 325 mg, 1 Tab, Oral, BID WITH MEALS, To Sharif MD, 325 mg at 09/23/20 3042   pantoprazole (PROTONIX) tablet 40 mg, 40 mg, Oral, ACB, To Sharif MD, 40 mg at 09/22/20 1038   amLODIPine (NORVASC) tablet 10 mg, 10 mg, Oral, DAILY, To Sharif MD, 10 mg at 09/23/20 9014   atorvastatin (LIPITOR) tablet 40 mg, 40 mg, Oral, QHS, To Sharif MD, 40 mg at 09/22/20 2107   carvediloL (COREG) tablet 25 mg, 25 mg, Oral, BID WITH MEALS, To Sharif MD, 25 mg at 09/23/20 1750   sevelamer carbonate (RENVELA) tab 1,600 mg, 1,600 mg, Oral, TID WITH MEALS, To Sharif MD, 1,600 mg at 09/23/20 0146   hydrALAZINE (APRESOLINE) tablet 25 mg, 25 mg, Oral, TID, To Sharif MD, 25 mg at 09/23/20 5784   sodium bicarbonate tablet 1,300 mg, 1,300 mg, Oral, TID, To Sharif MD, 1,300 mg at 09/23/20 2906   mirtazapine (REMERON) tablet 15 mg, 15 mg, Oral, QHS, To Sharif MD, 15 mg at 09/22/20 2103 Assessment Plan 1. ESRD on HD - Dialysis to be continued today; given hypotension, No fluid removed today; continued monitoring for hemodynamics 2. CHF c/b Fluid overload - Complicated by hypotension today; patient has not been eating today; on exam, does not appear to be fluid overload 3. Secondary hyperparathryroidism - new phosp levels requested 4. Anemia of chronic disease - new iron studies requested 5. Hep B serologies non reactive 6. COVID-19 PUI  - as per hospital protocol and management Signed By: Katie Pressley MD   
 September 23, 2020

## 2020-09-23 NOTE — PROGRESS NOTES
KUB reviewed and showed severe constipation and is most likely cause of patient abdominal pain. Will dc patient morphine IV and also increase patient colace to bid and give dose of miralax x 1 now and also give a biscoydal 10mg suppository x 1 now. Continue to monitor and if no improvement may need enema.

## 2020-09-23 NOTE — PROGRESS NOTES
When placing the patient's suppository, pt was observed to have large amount of stool. Pt did not tolerate nurse stimulating the area to try to break up some of the the stool to remove. Pt encouraged to drink fluids and to move around to help movement.

## 2020-09-23 NOTE — DIALYSIS
[facility] Dialysis Team [place]  [phone] Vitals   Pre   Post   Assessment   Pre   Post    
Temp  98.8  98.5 LOC  oriented to self  Oriented to self HR   76 78 Lungs   Diminished on room air Diminished on room air B/P   101/43 121/41 Cardiac   B/p wnl bp wnl Resp   20 22 Skin   intact  intact Pain level  Yelling for abd pain, nurse aware Yelling for abd pain, nurse aware Edema  none 
 
 none Orders: Duration:   Start:    1200 End:   1535 Total:   3.5hrs Dialyzer:   Dialyzer/Set Up Inspection: Ronold Pallas (09/23/20 1200) K Bath:   Dialysate K (mEq/L): 3 (09/23/20 1200) Ca Bath:   Dialysate CA (mEq/L): 3.0 (09/23/20 1200) Na/Bicarb:   Dialysate NA (mEq/L): 137 (09/23/20 1200) Target Fluid Removal:   Goal/Amount of Fluid to Remove (mL): 0 mL (09/23/20 1200) Access Type & Location:   Rt subclavian perma-cath. Each catheter limb disinfected per p&p, caps removed, hubs disinfected per p&p. Blood aspirated and lines flushed without any issues. Good blood flow. No signs of infection noted Labs Obtained/Reviewed Critical Results Called   Date when labs were drawn- 
Hgb-   
HGB Date Value Ref Range Status 09/23/2020 9.3 (L) 13.5 - 17.5 % Final  
 
K-   
Potassium Date Value Ref Range Status 09/23/2020 5.5 (H) 3.5 - 5.1 mmol/L Final  
 
Ca-  
Calcium Date Value Ref Range Status 09/23/2020 10.2 (H) 8.5 - 10.1 mg/dL Final  
 
Bun-  
BUN Date Value Ref Range Status 09/23/2020 76 (H) 6 - 20 mg/dL Final  
 
Creat-  
Creatinine Date Value Ref Range Status 09/23/2020 7.52 (H) 0.70 - 1.30 mg/dL Final  
 
  
Medications/ Blood Products Given Name   Dose   Route and Time None ordered Blood Volume Processed (BVP):    69.3 Net Fluid Removed:  +700ml of saline due to bot pulling off prime and rinse back fluid and giving 200ml bolus to help with b/p Comments Time Out Done: 1130 Primary Nurse Rpt Pre: Romeo Carter RN 
 Primary Nurse Rpt Post:Diana Carbajal RN 
Pt Education:Unable to educate pt due to alerted mental status Care Plan:ESRD Tx Summary: 
1200 dialysis started. Dr [place] Hanson called and informed of low b/p for dialysis fluid removal, orders given to not pull fluid during treatment 1245 b/p trending down, UF turned off and 100ml NS bolus given 1300 b/p low again 87/44, another 100ml NS bolus given. Dr Licha Holder at bedside and informed of fluid boluses given to help with low b/p 
1535 Dialysis completed and blood rinsed back. Each dialysis catheter limb disinfected per p&p, blood returned per p&p, each dialysis hub disinfected per p&p, post dialysis catheter dwell with saline instilled per order, and caps applied. Admiting Diagnosis:Fluid overload Pt's previous clinic- 
Consent signed - Informed Consent Verified: Yes (09/23/20 1200) Hepatitis Status- NEG AG 9/21/20 Machine #- Machine Number: [id number] (09/23/20 1200) Telemetry status- 
Pre-dialysis wt. -

## 2020-09-23 NOTE — PROGRESS NOTES
Pt educated on the findings of constipation and the importance of taking his medication to aid in the production of a bowel movement. Pt verbalized understanding but continues to need verbal prompting and encouragement to take prescribed medications for the constipation.

## 2020-09-23 NOTE — PROGRESS NOTES
Progress Note Date:9/23/2020       Room:226[facility] Patient Lexa Muhammad     YOB: 1950     Age:69 y.o. Subjective Allan Higginbotham is a 78 y.o. male followed by the [place] and  has a past medical history of Anemia, Chronic kidney disease, Chronic pain, Hypercholesterolemia, and Hypertension. Presents with abdominal pain also noted to be in fluid overload. patient was a poor historian as continually complained about pain. Patient does have a history of chronic pain and opiates restarted. CT scan of the abdomen pelvis was negative did show some evidence of chronic perinephric stranding. With patient's elevated BNP and pleural effusions noted as well as mild hypoxia and leukocytosis patient was referred for admission for fluid overload, end-stage renal disease and abdominal pain. Patient seen and evalauted. Laying in bed about to start dialysis does not really answer questions soon as I asked him about pain started to have moans. Was noted to have a bowel movement this morning while it was noted that patient spit up his medications no vomiting Review of Systems Constitutional: Negative for chills, diaphoresis, fatigue and fever. HENT: Negative for congestion, ear pain, postnasal drip, sinus pain and sore throat. Eyes: Negative for pain, discharge and redness. Respiratory: Negative for cough, shortness of breath and wheezing. Cardiovascular: Negative for chest pain and palpitations. Gastrointestinal: Positive for abdominal pain. Negative for constipation, diarrhea, nausea and vomiting. Genitourinary: Negative for dysuria, flank pain, frequency and urgency. Musculoskeletal: Negative for arthralgias and myalgias. Neurological: Negative for dizziness, weakness and headaches. Psychiatric/Behavioral: Negative for agitation and hallucinations. The patient is not nervous/anxious. Objective Vitals Last 24 Hours: 
Patient Vitals for the past 24 hrs: Temp Pulse Resp BP SpO2  
09/23/20 1530 98.5 °F (36.9 °C) 75 18 (!) 121/41   
09/23/20 1515  75 20 (!) 110/46   
09/23/20 1500  75 20 (!) 109/43   
09/23/20 1445  72 20 (!) 103/43   
09/23/20 1430  70 18 (!) 99/41   
09/23/20 1415  71 20 106/71   
09/23/20 1400  75 20 (!) 104/51   
09/23/20 1345  78 18 (!) 101/40   
09/23/20 1330  73 20 (!) 106/40   
09/23/20 1315  70 20 (!) 100/44   
09/23/20 1300  73 20 (!) 87/44   
09/23/20 1245  72 20 118/67   
09/23/20 1244 98.8 °F (37.1 °C) 76 21 (!) 99/46 94 % 09/23/20 1230  72 20 (!) 99/56   
09/23/20 1215  77 20 (!) 100/40   
09/23/20 1200 98.8 °F (37.1 °C) 76 22 (!) 101/43   
09/23/20 0704 98.5 °F (36.9 °C) 81 20 (!) 131/58 96 %  
09/23/20 0310 99.2 °F (37.3 °C) 91 20 (!) 124/53 95 % 09/22/20 2006 (!) 101.9 °F (38.8 °C) 90 20 (!) 120/47 96 % I/O (24Hr): Intake/Output Summary (Last 24 hours) at 9/23/2020 1727 Last data filed at 9/23/2020 1530 Gross per 24 hour Intake  Output 0 ml Net 0 ml Physical Exam: 
General: Alert, cooperative, no distress, appears stated age. Head:  Normocephalic, without obvious abnormality, atraumatic. Eyes:  Conjunctivae/corneas clear. Pupils equal, round, reactive to light. Extraocular movements intact. Lungs:  Clear to auscultation bilaterally. no wheeze, rales, crackles, rhonchi Chest wall: No tenderness or deformity. Heart:  Regular rate and rhythm, S1, S2 normal, no murmur, click, rub or gallop. Abdomen:  Soft, minimal -tender. Bowel sounds normal. No masses,  No organomegaly. Extremities: Extremities normal, atraumatic, no cyanosis or edema. Pulses: 2+ and symmetric all extremities. Skin: Skin color, texture, turgor normal. No rashes or lesions Neurologic: Awake, Alert, oriented. No obvious gross sensory or motor deficits Medications Current Facility-Administered Medications Medication Dose Route Frequency  metroNIDAZOLE (FLAGYL) IVPB premix 500 mg  500 mg IntraVENous Q8H  
 famotidine (PF) (PEPCID) 20 mg in 0.9% sodium chloride 10 mL injection  20 mg IntraVENous DAILY  brimonidine (ALPHAGAN) 0.2 % ophthalmic solution 1 Drop  1 Drop Both Eyes TID  dorzolamide (TRUSOPT) 2 % ophthalmic solution 1 Drop  1 Drop Both Eyes TID  docusate sodium (COLACE) capsule 100 mg  100 mg Oral BID  polyethylene glycol (MIRALAX) packet 17 g  17 g Oral DAILY  sodium chloride (NS) flush 5-40 mL  5-40 mL IntraVENous Q8H  
 sodium chloride (NS) flush 5-40 mL  5-40 mL IntraVENous PRN  
 oxyCODONE IR (ROXICODONE) tablet 5 mg  5 mg Oral Q8H PRN  
 oxyCODONE ER (OxyCONTIN) tablet 10 mg  10 mg Oral Q12H  ferrous sulfate tablet 325 mg  1 Tab Oral BID WITH MEALS  pantoprazole (PROTONIX) tablet 40 mg  40 mg Oral ACB  amLODIPine (NORVASC) tablet 10 mg  10 mg Oral DAILY  atorvastatin (LIPITOR) tablet 40 mg  40 mg Oral QHS  carvediloL (COREG) tablet 25 mg  25 mg Oral BID WITH MEALS  sevelamer carbonate (RENVELA) tab 1,600 mg  1,600 mg Oral TID WITH MEALS  
 hydrALAZINE (APRESOLINE) tablet 25 mg  25 mg Oral TID  sodium bicarbonate tablet 1,300 mg  1,300 mg Oral TID  mirtazapine (REMERON) tablet 15 mg  15 mg Oral QHS Allergies Penicillins; Acetaminophen; and Codeine Labs/Imaging/Diagnostics Labs: 
Recent Results (from the past 48 hour(s)) LIPASE Collection Time: 09/22/20  6:02 AM  
Result Value Ref Range Lipase 48 (L) 73 - 305 U/L  
METABOLIC PANEL, COMPREHENSIVE Collection Time: 09/22/20  6:02 AM  
Result Value Ref Range Sodium 135 (L) 136 - 145 mmol/L Potassium 4.9 3.5 - 5.1 mmol/L Chloride 99 97 - 108 mmol/L  
 CO2 26 21 - 32 mmol/L Anion gap 10 5 - 15 mmol/L Glucose 121 (H) 65 - 100 mg/dL BUN 44 (H) 6 - 20 mg/dL Creatinine 5.29 (H) 0.70 - 1.30 mg/dL BUN/Creatinine ratio 8 (L) 12 - 20 GFR est AA 13 (L) >60 ml/min/1.73m2 GFR est non-AA 11 (L) >60 ml/min/1.73m2 Calcium 9.8 8.5 - 10.1 mg/dL Bilirubin, total 0.7 0.2 - 1.0 mg/dL AST (SGOT) 28 15 - 37 U/L  
 ALT (SGPT) 21 12 - 78 U/L Alk. phosphatase 154 (H) 45 - 117 U/L Protein, total 7.4 6.4 - 8.2 g/dL Albumin 2.5 (L) 3.5 - 5.0 g/dL Globulin 4.9 (H) 2.0 - 4.0 g/dL A-G Ratio 0.5 (L) 1.1 - 2.2    
CBC WITH AUTOMATED DIFF Collection Time: 09/22/20  6:02 AM  
Result Value Ref Range WBC 8.5 4.4 - 11.3 K/uL  
 RBC 2.73 (L) 4.50 - 5.90 M/uL HGB 9.2 (L) 13.5 - 17.5 % HCT 28.0 (L) 41 - 53 % .8 (H) 80 - 100 FL  
 MCH 33.7 31 - 34 PG  
 MCHC 32.8 31.0 - 36.0 g/dL  
 RDW 18.1 (H) 11.5 - 14.5 % PLATELET 592 K/uL MPV 9.8 6.5 - 11.5 FL  
 NRBC 0.0  WBC ABSOLUTE NRBC 0.00 K/uL NEUTROPHILS 85 (H) 42 - 75 % LYMPHOCYTES 3 (L) 20.5 - 51.1 % MONOCYTES 10 (H) 1.7 - 9.3 % EOSINOPHILS 1 0.9 - 2.9 % BASOPHILS 1 0.0 - 2.5 % ABS. NEUTROPHILS 7.3 1.8 - 7.7 K/UL  
 ABS. LYMPHOCYTES 0.3 (L) 1.0 - 4.8 K/UL  
 ABS. MONOCYTES 0.8 0.2 - 2.4 K/UL  
 ABS. EOSINOPHILS 0.1 0.0 - 0.7 K/UL  
 ABS. BASOPHILS 0.1 0.0 - 0.2 K/UL  
CBC WITH AUTOMATED DIFF Collection Time: 09/23/20  6:12 AM  
Result Value Ref Range WBC 9.2 4.4 - 11.3 K/uL  
 RBC 2.75 (L) 4.50 - 5.90 M/uL HGB 9.3 (L) 13.5 - 17.5 % HCT 28.0 (L) 41 - 53 % .7 (H) 80 - 100 FL  
 MCH 33.7 31 - 34 PG  
 MCHC 33.1 31.0 - 36.0 g/dL  
 RDW 17.7 (H) 11.5 - 14.5 % PLATELET 459 K/uL MPV 9.3 6.5 - 11.5 FL  
 NRBC 0.0  WBC ABSOLUTE NRBC 0.00 K/uL NEUTROPHILS 90 (H) 42 - 75 % LYMPHOCYTES 2 (L) 20.5 - 51.1 % MONOCYTES 8 1.7 - 9.3 % EOSINOPHILS 0 (L) 0.9 - 2.9 % BASOPHILS 0 0.0 - 2.5 % ABS. NEUTROPHILS 8.2 (H) 1.8 - 7.7 K/UL  
 ABS. LYMPHOCYTES 0.2 (L) 1.0 - 4.8 K/UL  
 ABS. MONOCYTES 0.8 0.2 - 2.4 K/UL  
 ABS. EOSINOPHILS 0.0 0.0 - 0.7 K/UL  
 ABS. BASOPHILS 0.0 0.0 - 0.2 K/UL METABOLIC PANEL, BASIC  Collection Time: 09/23/20  6:12 AM  
 Result Value Ref Range Sodium 136 136 - 145 mmol/L Potassium 5.5 (H) 3.5 - 5.1 mmol/L Chloride 99 97 - 108 mmol/L  
 CO2 25 21 - 32 mmol/L Anion gap 12 5 - 15 mmol/L Glucose 146 (H) 65 - 100 mg/dL BUN 76 (H) 6 - 20 mg/dL Creatinine 7.52 (H) 0.70 - 1.30 mg/dL BUN/Creatinine ratio 10 (L) 12 - 20 GFR est AA 9 (L) >60 ml/min/1.73m2 GFR est non-AA 7 (L) >60 ml/min/1.73m2 Calcium 10.2 (H) 8.5 - 10.1 mg/dL Imaging: Xr Abd (kub) Result Date: 9/22/2020 Impression: Moderate to large burden of pancolonic stool with fecal rectal distention may indicate constipation if clinically correlated. Ct Abd Pelv Wo Cont Result Date: 9/21/2020 IMPRESSION: Cardiomegaly with bilateral pleural effusions and dependent changes in the lungs. Chronic or recurrent CHF? Indistinctness of the fat planes around the pancreas. There is chronic mild perinephric stranding, a nonspecific finding. The changes around the pancreas may be secondary to renal process. Correlate with laboratory data to exclude low-grade pancreatitis. Diverticulosis without evidence of diverticulitis. Extensive atherosclerotic vascular disease. Small amount of free fluid in the pelvis. This is an abnormal finding in a male but nonspecific. It could be secondary to any of the process is discussed above. Xr Chest [facility] Result Date: 9/20/2020 IMPRESSION: Vascular congestion suggests an element of fluid overload. Streaky airspace disease in both lower lung zones and at the left hilum may represent subsegmental atelectasis versus early infiltrate. No pneumothorax. Assessment//Plan Problem List: 
Hospital Problems  Date Reviewed: 8/21/2020 Codes Class Noted POA Constipation ICD-10-CM: K59.00 ICD-9-CM: 564.00  9/23/2020 Unknown ESRD (end stage renal disease) on dialysis ([facility] [address] ICD-10-CM: N18.6, Z99.2 ICD-9-CM: 585.6, V45.11  9/21/2020 Unknown  Fluid overload ICD-10-CM: E87.70 
 ICD-9-CM: 276.69  9/21/2020 Unknown Abdominal pain ICD-10-CM: R10.9 ICD-9-CM: 789.00  9/21/2020 Unknown Anemia ICD-10-CM: D64.9 ICD-9-CM: 285.9  9/21/2020 Unknown Chronic pain ICD-10-CM: G89.29 ICD-9-CM: 338.29  9/21/2020 Unknown Gastritis ICD-10-CM: K29.70 ICD-9-CM: 535.50  9/21/2020 Unknown Kid[facility] disease, chronic, end stage on dialysis [facility] ICD-10-CM: N18.6, Z99.2 ICD-9-CM: 585.6, V45.11  1/1/2020 Yes Hypertension ICD-10-CM: I10 
ICD-9-CM: 401.9  1/1/2020 Yes Hypercholesterolemia ICD-10-CM: E78.00 ICD-9-CM: 272.0  1/1/2020 Yes Kidney disease, chronic, end stage on dialysis ([facility] - ESRD on HD M,W, F 
- nephrology consulted  
- continue dialysis as scheduled by nephrology . - restart patient home meds of Renvela. TID WM Hypercholesterolemia 
- continue dose of atorvastatin 40mg oral QHS Hypertension 
- monitor vitals q4hrs. - reconcile home meds from the [place] 
- most recent discharge patient was on amlodipine 10 mg, hydralazine as well as Coreg twice daily dosing - 9/22 vitals signs stable with current meds. Continue to monitor Fluid overload 
-Patient noted to be have elevated BNP as well as effusions noted on CT and patient states he has done his scheduled dialysis -Nephrology consulted to do dialysis today 
-Monitor closely and fluid restriction Abdominal pain 
-Patient presents with extreme abdominal pain and patient has a history of gastritis CT of the abdomen pelvis shows perinephric stranding possibility of gastritis/pancreatitis patient has had a history  
-Patient does have chronic pain issues and is on twice daily OxyContin 15 mg as well as immediate release oxycodone 5 mg every 4 hours as needed 
-Did place patient on as needed morphine 2 mg which was decreased to 1mg  
-Lipase levels normal range 
-Also started on Pepcid 20 mg IV twice daily 
-Patient's home pantoprazole 40 mg oral daily -Patient also on carafate 1 g oral prior to prior  Meals but was recommended to be used cautiosly so will dc  
-GI consultation appreciated. Recommended to treat for possible infectious source as CT abdomen showed diverituclosis but patient pain is more gastric in nature but start metrondiazole and levaqquin dose as per pharmacy  
-Patient KUB done on 9/22 shows extensive constipation and was given Colace 100 mg oral twice daily MiraLAX dose as well as a bisacodyl suppository and nurse notes the patient did have bowel movement this morning and also will get a soapsuds enema. Patient's abdominal pain has improved follow-up in a.m. we will most likely discontinue all antibiotics and will advance patient's diet Chronic pain 
-Restarted patient's home oxycodone release and OxyContin Anemia 
-Hemoglobin is stable and iron profile shows iron deficiency with iron level of 18 
-Patient on ferrous sulfate 325 mg oral twice daily with meals Gastritis 
- history of gastritis in the past and patient out of ordinary pain with negative CT scan 
-Restart patient's home pantoprazole 40 mg daily 
-Pepcid 20 mg IV twice daily 
-Consultation GI appreciated - 9/22 patient had a vomiting episode small, does have some abdominal pain but will obtain kub and evaluate  
-/23 KUB did show constipation but patient did have bowel movement will advance diet DVT prophylaxis -Heparin twice daily Electronically signed by Mick Diamond MD on 9/23/2020 at 3:51 PM

## 2020-09-23 NOTE — PROGRESS NOTES
Pt tolerated PO medications this morning but still not taking Liquid Diet. Pt encouraged to take his suppository. Reported from PM nurse that the Pt refused. Pt agreed to allow me to administer the suppository.

## 2020-09-24 NOTE — PROGRESS NOTES
Progress Note Patient: Suhas Baer MRN: [id number]  SSN: [id number] YOB: 1950  Age: 78 y.o. Sex: male Admit Date: 9/21/2020 LOS: 3 days Subjective:  
 
Patient presents with abdominal pain, noted some constipation but patient states he has been moving bowels. Eating well without acute complaint. Objective:  
 
Vitals:  
 09/24/20 0100 09/24/20 0500 09/24/20 0745 09/24/20 1215 BP: (!) 135/103 (!) 89/57 (!) 98/56 (!) 111/42 Pulse: 92 100 (!) 115 (!) 111 Resp: 18 16 20 20 Temp: 98.5 °F (36.9 °C) 97.9 °F (36.6 °C) 98.2 °F (36.8 °C) 96.8 °F (36 °C) SpO2: 95% 100% 95% 100% Weight:      
Height:      
  
 
Intake and Output: 
Current Shift: No intake/output data recorded. Last three shifts: No intake/output data recorded. Physical Exam:  
GENERAL: alert, cooperative, no distress, appears stated age THROAT & NECK: normal and no erythema or exudates noted. LUNG: clear to auscultation bilaterally HEART: regular rate and rhythm, S1, S2 normal, no murmur, click, rub or gallop ABDOMEN: soft, non-tender. Bowel sounds normal. No masses,  no organomegaly EXTREMITIES:  extremities normal, atraumatic, no cyanosis or edema SKIN: no rash or abnormalities NEUROLOGIC: AOx3. Gait normal. Reflexes and motor strength normal and symmetric. Cranial nerves 2-12 and sensation grossly intact. PSYCHIATRIC: non focal 
 
Lab/Data Review: All lab results for the last 24 hours reviewed. Recent Results (from the past 24 hour(s)) CBC WITH AUTOMATED DIFF Collection Time: 09/24/20  5:38 AM  
Result Value Ref Range WBC 9.9 4.4 - 11.3 K/uL  
 RBC 2.75 (L) 4.50 - 5.90 M/uL HGB 9.3 (L) 13.5 - 17.5 % HCT 28.5 (L) 41 - 53 % .6 (H) 80 - 100 FL  
 MCH 33.9 31 - 34 PG  
 MCHC 32.7 31.0 - 36.0 g/dL  
 RDW 18.2 (H) 11.5 - 14.5 % PLATELET 026 K/uL MPV 10.7 6.5 - 11.5 FL  
 NRBC 10.0  WBC ABSOLUTE NRBC 0.01 K/uL NEUTROPHILS 87 (H) 42 - 75 % LYMPHOCYTES 4 (L) 20.5 - 51.1 % MONOCYTES 9 1.7 - 9.3 % EOSINOPHILS 0 (L) 0.9 - 2.9 % BASOPHILS 0 0.0 - 2.5 % ABS. NEUTROPHILS 8.6 (H) 1.8 - 7.7 K/UL  
 ABS. LYMPHOCYTES 0.4 (L) 1.0 - 4.8 K/UL  
 ABS. MONOCYTES 0.9 0.2 - 2.4 K/UL  
 ABS. EOSINOPHILS 0.0 0.0 - 0.7 K/UL  
 ABS. BASOPHILS 0.0 0.0 - 0.2 K/UL RENAL FUNCTION PANEL Collection Time: 09/24/20  5:38 AM  
Result Value Ref Range Sodium 137 136 - 145 mmol/L Potassium 4.8 3.5 - 5.1 mmol/L Chloride 100 97 - 108 mmol/L  
 CO2 27 21 - 32 mmol/L Anion gap 10 5 - 15 mmol/L Glucose 152 (H) 65 - 100 mg/dL BUN 52 (H) 6 - 20 mg/dL Creatinine 5.33 (H) 0.70 - 1.30 mg/dL BUN/Creatinine ratio 10 (L) 12 - 20 GFR est AA 13 (L) >60 ml/min/1.73m2 GFR est non-AA 11 (L) >60 ml/min/1.73m2 Calcium 10.1 8.5 - 10.1 mg/dL Phosphorus 8.3 (H) 2.6 - 4.7 mg/dL Albumin 2.4 (L) 3.5 - 5.0 g/dL Imaging: No results found. Assessment and Plan: Abdominal pain 
-Likely from constipation, now seems to have resolved 
-Continue management of constipation Leukocytosis 
-Clear etiology, initially with concern of intra-abdominal infection 
-PET/CT scan of the abdomen and pelvis does not show any acute pathology 
-Discontinue antibiotics and observe End-stage renal disease 
-On hemodialysis Monday Wednesday and Friday 
-Nephrology following Hypertension 
-Blood pressure low this a.m. 
-Holding antihypertensives Dyslipidemia 
-Continue statin Chronic pain 
-On chronic pain management with OxyContin Anemia 
-Anemia of chronic kidney disease and iron deficiency 
-Continue iron supplements 
-Monitor H&H Fall 
-Patient was seen crawling on the floor earlier today and code Brittnee was called 
-No apparent injury 
-PT to evaluate DVT Prophylaxis -Heparin and SCDs Signed By: Ana Maria Agrawal MD   
 September 24, 2020

## 2020-09-24 NOTE — PROGRESS NOTES
Attempted to speak with the patient regarding home care. When asked about home situation, who he lives with, pt kept saying \" they know\" continued to try and get information from patient but he stated \" leave me alone, you are annoying me\" informed nurse and Dr. Kathy Ibarra this.

## 2020-09-24 NOTE — ROUTINE PROCESS
Rocco Diehl NP MADE AWARE PT. DONT HAVE IV SITE. CHANGED IV MEDICATION TO PO. PT. B/P STILL LOW HOLD BLOOD PRESSURE MEDICATION.

## 2020-09-24 NOTE — ROUTINE PROCESS
Bedside shift change report given to [facility] SEA(oncoming nurse) by Conor Gomez nurse). Report included the following information SBAR.

## 2020-09-24 NOTE — PROGRESS NOTES
Renal Daily Progress Note Admit Date: 9/21/2020 Hospital day 3 Subjective:  
 
He dialyzed yesterday with hypotension. Blood pressure dipped into the 70V systolic. He is still complaining of abdominal pain though better. Current Facility-Administered Medications Medication Dose Route Frequency  heparin (porcine) injection 5,000 Units  5,000 Units SubCUTAneous Q12H  
 metroNIDAZOLE (FLAGYL) IVPB premix 500 mg  500 mg IntraVENous Q8H  
 famotidine (PF) (PEPCID) 20 mg in 0.9% sodium chloride 10 mL injection  20 mg IntraVENous DAILY  brimonidine (ALPHAGAN) 0.2 % ophthalmic solution 1 Drop  1 Drop Both Eyes TID  dorzolamide (TRUSOPT) 2 % ophthalmic solution 1 Drop  1 Drop Both Eyes TID  docusate sodium (COLACE) capsule 100 mg  100 mg Oral BID  polyethylene glycol (MIRALAX) packet 17 g  17 g Oral DAILY  sodium chloride (NS) flush 5-40 mL  5-40 mL IntraVENous Q8H  
 sodium chloride (NS) flush 5-40 mL  5-40 mL IntraVENous PRN  
 oxyCODONE IR (ROXICODONE) tablet 5 mg  5 mg Oral Q8H PRN  
 oxyCODONE ER (OxyCONTIN) tablet 10 mg  10 mg Oral Q12H  ferrous sulfate tablet 325 mg  1 Tab Oral BID WITH MEALS  pantoprazole (PROTONIX) tablet 40 mg  40 mg Oral ACB  amLODIPine (NORVASC) tablet 10 mg  10 mg Oral DAILY  atorvastatin (LIPITOR) tablet 40 mg  40 mg Oral QHS  carvediloL (COREG) tablet 25 mg  25 mg Oral BID WITH MEALS  sevelamer carbonate (RENVELA) tab 1,600 mg  1,600 mg Oral TID WITH MEALS  
 hydrALAZINE (APRESOLINE) tablet 25 mg  25 mg Oral TID  sodium bicarbonate tablet 1,300 mg  1,300 mg Oral TID  mirtazapine (REMERON) tablet 15 mg  15 mg Oral QHS Review of Systems Review of Systems Respiratory: Negative for cough and shortness of breath. Cardiovascular: Negative for chest pain. Gastrointestinal: Positive for abdominal pain. Negative for nausea and vomiting. Neurological: Negative for dizziness and headaches. Objective: Patient Vitals for the past 8 hrs: 
 BP Temp Pulse Resp SpO2  
09/24/20 0745 (!) 98/56 98.2 °F (36.8 °C) (!) 115 20 95 % 09/24/20 0500 (!) 89/57 97.9 °F (36.6 °C) 100 16 100 % No intake/output data recorded. No intake/output data recorded. Physical Exam:  
Physical Exam  
Cardiovascular: Regular rhythm. Pulmonary/Chest: No respiratory distress. He has no rales. Abdominal: Soft. There is abdominal tenderness. Musculoskeletal:     
   General: No edema. Skin: Skin is warm and dry. Vitals reviewed. XR ABD (KUB) Final Result Impression: Moderate to large burden of pancolonic stool with fecal rectal  
distention may indicate constipation if clinically correlated. CT ABD PELV WO CONT Final Result IMPRESSION: Cardiomegaly with bilateral pleural effusions and dependent changes  
in the lungs. Chronic or recurrent CHF? Indistinctness of the fat planes around the pancreas. There is chronic mild  
perinephric stranding, a nonspecific finding. The changes around the pancreas  
may be secondary to renal process. Correlate with laboratory data to exclude  
low-grade pancreatitis. Diverticulosis without evidence of diverticulitis. Extensive atherosclerotic vascular disease. Small amount of free fluid in the pelvis. This is an abnormal finding in a male  
but nonspecific. It could be secondary to any of the process is discussed above. Data Review Recent Labs  
  09/24/20 
0538 09/23/20 
0612 09/22/20 
0602 WBC 9.9 9.2 8.5 HGB 9.3* 9.3* 9.2* HCT 28.5* 28.0* 28.0*  
 128 143 Recent Labs  
  09/24/20 
0538 09/23/20 
0612 09/22/20 
0602  136 135* K 4.8 5.5* 4.9  
 99 99 CO2 27 25 26 * 146* 121* BUN 52* 76* 44* CREA 5.33* 7.52* 5.29* CA 10.1 10.2* 9.8 PHOS 8.3*  --   --   
ALB 2.4*  --  2.5* ALT  --   --  21 No components found for: [place] No results for input(s): PH, PCO2, PO2, HCO3, FIO2 in the last 72 hours. No results for input(s): INR, INREXT, INREXT in the last 72 hours. Assessment:  
 
 
 
 
Active Problems: 
  Kidney disease, chronic, end stage on dialysis ([facility] [address] (1/1/2020) Hypertension (1/1/2020) Hypercholesterolemia (1/1/2020) ESRD (end stage renal disease) on dialysis ([facility] [address] (9/21/2020) Fluid overload (9/21/2020) Abdominal pain (9/21/2020) Anemia (9/21/2020) Chronic pain (9/21/2020) Gastritis (9/21/2020) Constipation (9/23/2020) Abdominal pain is better Plan:  
 
Hemodialysis tomorrow morning. He has pleural effusions. Will decrease dry weight as his blood pressure permits. Will hold amlodipine if systolic blood pressures less than 140. Increase Renvela to help lower the phosphorus levels.

## 2020-09-25 PROBLEM — I46.9 CARDIAC ARREST (HCC): Status: ACTIVE | Noted: 2020-01-01

## 2020-09-25 NOTE — PROGRESS NOTES
Pt Evaluation was attempted today. Upon entering room patient was yelling incoherently. When questioned he would juts continue to yell incoherently. Pt appeared restless and confused and as such is not appropriate for therapy at this time. MD and RN assigned to the patient were both notified as was the .

## 2020-09-25 NOTE — ROUTINE PROCESS
Patient's BP 60's/30's at 1400. Dr. Minerva Stauffer called to the bedside. Attempted several times to obtain IV access without success. ED called to assist with ultrasound. Several unsuccessful attempts for access. BP remained 21'S-00'U systolic over 98'V. Patient remains responsive. EJ access eventually obtained and fluid bolus started. Pressures remained in the 60's/30's. Oxygen saturations began dropping and patient was placed on O2 via NC (placed by ED RN, I was not present) Physician aware. At approximately 1700 the patient was transferred to the ED to be started on Levophed as his pressures were not increasing. Upon arrival to the ED the patient was placed on a non-rebreather at 15L O2 and care taken over by the ED RN.

## 2020-09-25 NOTE — PROGRESS NOTES
Progress Note Patient: Eugenia Goldstein MRN: [id number]  SSN: [id number] YOB: 1950  Age: 78 y.o. Sex: male Admit Date: 9/21/2020 LOS: 4 days Subjective:  
 
Patient presents with abdominal pain, noted some constipation but patient states he has been moving bowels. Eating well without acute complaint. Objective:  
 
Vitals:  
 09/24/20 2100 09/25/20 0100 09/25/20 0400 09/25/20 5974 BP: 94/63 (!) 99/54 (!) 88/59 (!) 104/57 Pulse: (!) 130 (!) 105 (!) 113 (!) 126 Resp: 18 18 18 20 Temp: 97.5 °F (36.4 °C) 97.9 °F (36.6 °C) 98.8 °F (37.1 °C) 97.5 °F (36.4 °C) SpO2: 98% 97% 97% 93% Weight:      
Height:      
  
 
Intake and Output: 
Current Shift: No intake/output data recorded. Last three shifts: 09/23 1901 - 09/25 0700 In: 240 [P.O.:240] Out: 0 Physical Exam:  
GENERAL: alert, cooperative, no distress, appears stated age THROAT & NECK: normal and no erythema or exudates noted. LUNG: clear to auscultation bilaterally HEART: regular rate and rhythm, S1, S2 normal, no murmur, click, rub or gallop ABDOMEN: soft, non-tender. Bowel sounds normal. No masses,  no organomegaly EXTREMITIES:  extremities normal, atraumatic, no cyanosis or edema SKIN: no rash or abnormalities NEUROLOGIC: AOx3. Gait normal. Reflexes and motor strength normal and symmetric. Cranial nerves 2-12 and sensation grossly intact. PSYCHIATRIC: non focal 
 
Lab/Data Review: All lab results for the last 24 hours reviewed. Recent Results (from the past 24 hour(s)) METABOLIC PANEL, BASIC Collection Time: 09/25/20  5:38 AM  
Result Value Ref Range Sodium 137 136 - 145 mmol/L Potassium 5.6 (H) 3.5 - 5.1 mmol/L Chloride 99 97 - 108 mmol/L  
 CO2 24 21 - 32 mmol/L Anion gap 14 5 - 15 mmol/L Glucose 178 (H) 65 - 100 mg/dL BUN 93 (H) 6 - 20 mg/dL Creatinine 7.49 (H) 0.70 - 1.30 mg/dL BUN/Creatinine ratio 12 12 - 20 GFR est AA 9 (L) >60 ml/min/1.73m2 GFR est non-AA 7 (L) >60 ml/min/1.73m2 Calcium 10.3 (H) 8.5 - 10.1 mg/dL Imaging: No results found. Assessment and Plan: Abdominal pain 
-Likely from constipation, now seems to have resolved 
-Continue management of constipation Leukocytosis 
-Clear etiology, initially with concern of intra-abdominal infection 
-PET/CT scan of the abdomen and pelvis does not show any acute pathology 
-Discontinue antibiotics and observe End-stage renal disease 
-On hemodialysis Monday Wednesday and Friday 
-Nephrology following Hypertension 
-Blood pressure low this a.m. 
-Holding antihypertensives Dyslipidemia 
-Continue statin Chronic pain 
-On chronic pain management with OxyContin Anemia 
-Anemia of chronic kidney disease and iron deficiency 
-Continue iron supplements 
-Monitor H&H Fall 
-Patient was seen crawling on the floor earlier today and code Autumn was called 
-No apparent injury 
-Patient refusing to participate with PT 
 
DVT Prophylaxis -Heparin and SCDs Signed By: Ana Maria Agrawal MD   
 September 25, 2020

## 2020-09-25 NOTE — PROGRESS NOTES
Renal Daily Progress Note Admit Date: 9/21/2020 Hospital day 5 Subjective:  
 
Continues to be in agony and not entirely communicative. Ate well today. BP continues to low Current Facility-Administered Medications Medication Dose Route Frequency  sodium bicarbonate tablet 650 mg  650 mg Oral TID  sevelamer carbonate (RENVELA) tab 2,400 mg  2,400 mg Oral TID WITH MEALS  famotidine (PEPCID) tablet 20 mg  20 mg Oral DAILY  metroNIDAZOLE (FLAGYL) tablet 500 mg  500 mg Oral Q8H  
 heparin (porcine) injection 5,000 Units  5,000 Units SubCUTAneous Q12H  
 brimonidine (ALPHAGAN) 0.2 % ophthalmic solution 1 Drop  1 Drop Both Eyes TID  dorzolamide (TRUSOPT) 2 % ophthalmic solution 1 Drop  1 Drop Both Eyes TID  docusate sodium (COLACE) capsule 100 mg  100 mg Oral BID  polyethylene glycol (MIRALAX) packet 17 g  17 g Oral DAILY  sodium chloride (NS) flush 5-40 mL  5-40 mL IntraVENous Q8H  
 sodium chloride (NS) flush 5-40 mL  5-40 mL IntraVENous PRN  
 oxyCODONE IR (ROXICODONE) tablet 5 mg  5 mg Oral Q8H PRN  
 oxyCODONE ER (OxyCONTIN) tablet 10 mg  10 mg Oral Q12H  ferrous sulfate tablet 325 mg  1 Tab Oral BID WITH MEALS  pantoprazole (PROTONIX) tablet 40 mg  40 mg Oral ACB  atorvastatin (LIPITOR) tablet 40 mg  40 mg Oral QHS  carvediloL (COREG) tablet 25 mg  25 mg Oral BID WITH MEALS  
 hydrALAZINE (APRESOLINE) tablet 25 mg  25 mg Oral TID  mirtazapine (REMERON) tablet 15 mg  15 mg Oral QHS Review of Systems Review of Systems Respiratory: Negative for cough and shortness of breath. Cardiovascular: Negative for chest pain. Gastrointestinal: Positive for abdominal pain. Negative for nausea and vomiting. Neurological: Negative for dizziness and headaches. Objective:  
 
Patient Vitals for the past 8 hrs: 
 BP Temp Pulse Resp SpO2  
09/25/20 0904 (!) 104/57 97.5 °F (36.4 °C) (!) 126 20 93 % No intake/output data recorded. 09/23 1901 - 09/25 0700 In: 240 [P.O.:240] Out: 0 Physical Exam:  
Physical Exam  
Cardiovascular: Regular rhythm. Pulmonary/Chest: No respiratory distress. He has no rales. Abdominal: Soft. There is abdominal tenderness. Musculoskeletal:     
   General: No edema. Skin: Skin is warm and dry. Vitals reviewed. XR ABD (KUB) Final Result Impression: Moderate to large burden of pancolonic stool with fecal rectal  
distention may indicate constipation if clinically correlated. CT ABD PELV WO CONT Final Result IMPRESSION: Cardiomegaly with bilateral pleural effusions and dependent changes  
in the lungs. Chronic or recurrent CHF? Indistinctness of the fat planes around the pancreas. There is chronic mild  
perinephric stranding, a nonspecific finding. The changes around the pancreas  
may be secondary to renal process. Correlate with laboratory data to exclude  
low-grade pancreatitis. Diverticulosis without evidence of diverticulitis. Extensive atherosclerotic vascular disease. Small amount of free fluid in the pelvis. This is an abnormal finding in a male  
but nonspecific. It could be secondary to any of the process is discussed above. Data Review Recent Labs  
  09/24/20 
0538 09/23/20 
0289 WBC 9.9 9.2 HGB 9.3* 9.3* HCT 28.5* 28.0*  
 128 Recent Labs  
  09/25/20 
7181 09/24/20 
7350 09/23/20 
8900  137 136  
K 5.6* 4.8 5.5* CL 99 100 99 CO2 24 27 25 * 152* 146* BUN 93* 52* 76* CREA 7.49* 5.33* 7.52* CA 10.3* 10.1 10.2* PHOS  --  8.3*  --   
ALB  --  2.4*  -- No components found for: [place] No results for input(s): PH, PCO2, PO2, HCO3, FIO2 in the last 72 hours. No results for input(s): INR, INREXT, INREXT, INREXT in the last 72 hours. Assessment: #ESRD on HD-MWF # Hypotension # Severe constipation # Metabolic bone disease Plan:  
Hold hydralazine and amlodipine Hold sodium bicarbonate. Eating better today; can continue sevelamer Dialysis today without significant UF given hypotension

## 2020-09-25 NOTE — ED NOTES
EMERGENCY DEPARTMENT HISTORY AND PHYSICAL EXAM 
 
 
Date: 9/21/2020 Patient Name: Sandie Lorenz History of Presenting Illness Chief Complaint Patient presents with  Abdominal Pain History Provided By: Conrad Gonzalez 
 
HPI: Sandie Lorenz, 78 y.o. male with a past medical history significant RENAL FAILURE, DIALYSIS presents to the ED with cc of HYPOTENSION There are no other complaints, changes, or physical findings at this time. PCP: None Current Facility-Administered Medications Medication Dose Route Frequency Provider Last Rate Last Dose  albumin human 25% (BUMINATE) solution 25 g  25 g IntraVENous ONCE Breana Luciano NP      
 NOREPINephrine (LEVOPHED) 4,000 mcg in dextrose 5% 250 mL infusion  2 mcg/min IntraVENous TITRATE Breana Luciano NP      
 NOREPINephrine (LEVOPHED) 1 mg/mL injection  insulin regular (NOVOLIN R, HUMULIN R) 100 unit/mL injection  midazolam (PF) (VERSED) 1 mg/mL injection  sevelamer carbonate (RENVELA) tab 2,400 mg  2,400 mg Oral TID WITH MEALS Rich Burks MD   2,400 mg at 09/25/20 9596  famotidine (PEPCID) tablet 20 mg  20 mg Oral DAILY Breana Luciano NP      
 metroNIDAZOLE (FLAGYL) tablet 500 mg  500 mg Oral Q8H Ann Marie Luciano NP   500 mg at 09/25/20 1410  
 heparin (porcine) injection 5,000 Units  5,000 Units SubCUTAneous Q12H Raymundo Sharif MD   5,000 Units at 09/25/20 0546  
 brimonidine (ALPHAGAN) 0.2 % ophthalmic solution 1 Drop  1 Drop Both Eyes TID Marycarmen Torrez MD   1 Drop at 09/24/20 2228  dorzolamide (TRUSOPT) 2 % ophthalmic solution 1 Drop  1 Drop Both Eyes TID Marycarmen Torrez MD   1 Drop at 09/24/20 2228  docusate sodium (COLACE) capsule 100 mg  100 mg Oral BID Mohamud MUÑOZ MD   100 mg at 09/24/20 1801  polyethylene glycol (MIRALAX) packet 17 g  17 g Oral DAILY Marycarmen Torrez MD   17 g at 09/25/20 4090  sodium chloride (NS) flush 5-40 mL  5-40 mL IntraVENous Q8H Pedro Pablo Schultz MD   5 mL at 20 1416  sodium chloride (NS) flush 5-40 mL  5-40 mL IntraVENous PRN Pedro Pablo Schultz MD   10 mL at 20 2129  
 oxyCODONE IR (ROXICODONE) tablet 5 mg  5 mg Oral Q8H PRN Adina Payne MD   5 mg at 20 4481  oxyCODONE ER (OxyCONTIN) tablet 10 mg  10 mg Oral Q12H Aida Sharif MD   10 mg at 20 2227  ferrous sulfate tablet 325 mg  1 Tab Oral BID WITH MEALS Adina Payne MD   325 mg at 20 1801  pantoprazole (PROTONIX) tablet 40 mg  40 mg Oral ACB Adina Payne MD   40 mg at 20 7579  atorvastatin (LIPITOR) tablet 40 mg  40 mg Oral QHS Adina Payne MD   40 mg at 20 2227  carvediloL (COREG) tablet 25 mg  25 mg Oral BID WITH MEALS Adina Payne MD   Stopped at 20 0800  mirtazapine (REMERON) tablet 15 mg  15 mg Oral QHS Adina Payne MD   15 mg at 20 2227 Current Outpatient Medications Medication Sig Dispense Refill  oxyCODONE IR (ROXICODONE) 5 mg immediate release tablet Take 5 mg by mouth every eight (8) hours as needed for Pain.  oxyCODONE ER (OxyCONTIN) 15 mg ER tablet Take 15 mg by mouth every twelve (12) hours. Past History Past Medical History: 
Past Medical History:  
Diagnosis Date  Anemia  Chronic kidney disease  Chronic pain   
 back and neck  Hypercholesterolemia  Hypertension Past Surgical History: 
Past Surgical History:  
Procedure Laterality Date  HX ARTERIOVENOUS FISTULA  2020  HX CHOLECYSTECTOMY  HX UROLOGICAL    
 bladder surgery Family History: 
Family History Problem Relation Age of Onset  Diabetes Mother Social History: 
Social History Tobacco Use  Smoking status: Former Smoker Packs/day: 0.50 Years: 10.00 Pack years: 5.00 Last attempt to quit:  Years since quittin.7  Smokeless tobacco: Never Used Substance Use Topics  Alcohol use: Not Currently  Drug use: Not Currently Allergies: Allergies Allergen Reactions  Penicillins Itching  Acetaminophen Itching  Codeine Itching Review of Systems Review of Systems Physical Exam  
 
Physical Exam 
Skin: 
   General: Skin is dry. Comments: COLD EXTREMITIES Diagnostic Study Results Labs - No results found for this or any previous visit (from the past 12 hour(s)). Radiologic Studies -  
[unfilled] CT Results  (Last 48 hours) None CXR Results  (Last 48 hours) None Medical Decision Making and ED Course I am the first provider for this patient. I reviewed the vital signs, available nursing notes, past medical history, past surgical history, family history and social history. Vital Signs-Reviewed the patient's vital signs. Patient Vitals for the past 12 hrs: 
 Temp Pulse Resp BP SpO2  
09/25/20 0904 97.5 °F (36.4 °C) (!) 126 20 (!) 104/57 93 % Records Reviewed: The patient presents with HYPOTENSION with a differential diagnosis of HYPOVOLEMIC, TACHYARRHYTHMIA, SEPSIS Provider Notes (Medical Decision Making): [facility]  
 
 
ED Course:  
Initial assessment performed. The patients presenting problems have been discussed, and they are in agreement with the care plan formulated and outlined with them. I have encouraged them to ask questions as they arise throughout their visit. ED Course as of Sep 25 1756 Mon Sep 21, 2020  
0325 WBC(!): 15.4 [BH] 3797 Lactic acid: 1.4 [BH] 4554 reviewed Amylase: 83 [BH] 9142 Sodium(!): 131 [BH] 0337 Troponin-I, Qt.(!): 0.51 [BH] 0337 WBC(!): 15.4 [BH] 3555 reviewed HGB(!): 9.6 [BH] ED Course User Index [BH] Hung Ruano MD  
 
 
 
Procedures Graydon Osgood, MD 
Intubation Date/Time: 9/25/2020 5:48 PM 
Performed by: Concepcion Moses MD 
 Authorized by: Natan Frederick MD  
 
Consent:  
  Consent obtained:  Emergent situation Pre-procedure details:  
  Patient status:  Unresponsive Pretreatment medications:  Midazolam 
  Paralytics:  Succinylcholine Procedure details:  
  Preoxygenation:  Nonrebreather mask CPR in progress: yes Intubation method:  Oral 
  Oral intubation technique:  Video-assisted and lighted stylet Laryngoscope blade: Mac 4 Tube size (mm):  7.5 Tube type:  Cuffed Number of attempts:  1 Cricoid pressure: no   
  Tube visualized through cords: yes Placement assessment: ETT to lip:  17 Tube secured with: Adhesive tape Breath sounds:  Equal 
  Placement verification: equal breath sounds and ETCO2 detector CXR findings:  ETT in proper place Post-procedure details:  
  Patient tolerance of procedure: Tolerated well, no immediate complications CRITICAL CARE NOTE : 
5:56 PM 
Amount of Critical Care Time: ___90_(minutes)__ IMPENDING DETERIORATION -Airway ASSOCIATED RISK FACTORS - Hypotension and Hypoxia MANAGEMENT- Bedside Assessment INTERPRETATION -  Xrays, Blood Gases, ECG and Blood Pressure INTERVENTIONS - FEMORAL LINE BY NURSE PRACTIONER, SEE HIS NOTE 
CASE REVIEW -  
TREATMENT RESPONSE -2 PRESSORS AND IVF PERFORMED BY - DR ESPINOZA 
 
NOTES   : 
I have spent critical care time involved in lab review, consultations with specialist, family decision- making, bedside attention and documentation. This time excludes time spent in any separate billed procedures. During this entire length of time I was immediately available to the patient . Omar Montero MD 
 
 
 
Disposition Admitted DISCHARGE PLAN: 
1. Current Discharge Medication List  
  
CONTINUE these medications which have NOT CHANGED Details  
oxyCODONE IR (ROXICODONE) 5 mg immediate release tablet Take 5 mg by mouth every eight (8) hours as needed for Pain. oxyCODONE ER (OxyCONTIN) 15 mg ER tablet Take 15 mg by mouth every twelve (12) hours. 2.  
Follow-up Information None 3. Return to ED if worse Diagnosis Clinical Impression: 1. Acute combined systolic and diastolic congestive heart failure ([address] 2. Chronic renal failure, unspecified CKD stage 3. Generalized abdominal pain 4. Anemia due to chronic kidney disease, on chronic dialysis ([facility] [address] 5. Chronic pain syndrome 6. Gastritis without bleeding, unspecified chronicity, unspecified gastritis type Attestations: 
 
Chris Lea MD 
 
Please note that this dictation was completed with [employer], the computer voice recognition software. Quite often unanticipated grammatical, syntax, homophones, and other interpretive errors are inadvertently transcribed by the computer software. Please disregard these errors. Please excuse any errors that have escaped final proofreading. Thank you.

## 2020-09-25 NOTE — DISCHARGE SUMMARY
Physician Discharge Summary Patient: Ankush Huitron MRN: [id number] YOB: 1950  Age: 78 y.o. Sex: male PCP: None Allergies: Penicillins; Acetaminophen; and Codeine Admit date: 9/21/2020 Admitting Provider: Selma Nguyen MD 
 
Discharge date: 9/25/2020 Discharging Provider: Lázaro Dalton NP 
 
* Admission Diagnoses:  
Pre-Operative Risk Assessment Using 2014 ACC/AHA Guidelines Fluid overload [E87.70] ESRD (end stage renal disease) on dialysis ([facility] [N18.6, Z99.2] * Discharge Diagnoses:   
Hospital Problems as of 9/25/2020 Date Reviewed: 8/21/2020 Codes Class Noted - Resolved POA Constipation ICD-10-CM: K59.00 ICD-9-CM: 564.00  9/23/2020 - Present Unknown ESRD (end stage renal disease) on dialysis ([facility] ICD-10-CM: N18.6, Z99.2 ICD-9-CM: 585.6, V45.11  9/21/2020 - Present Unknown Fluid overload ICD-10-CM: E87.70 ICD-9-CM: 276.69  9/21/2020 - Present Unknown Abdominal pain ICD-10-CM: R10.9 ICD-9-CM: 789.00  9/21/2020 - Present Unknown Anemia ICD-10-CM: D64.9 ICD-9-CM: 285.9  9/21/2020 - Present Unknown Chronic pain ICD-10-CM: G89.29 ICD-9-CM: 338.29  9/21/2020 - Present Unknown Gastritis ICD-10-CM: K29.70 ICD-9-CM: 535.50  9/21/2020 - Present Unknown Kidney disease, chronic, end stage on dialysis ([facility] ICD-10-CM: N18.6, Z99.2 ICD-9-CM: 585.6, V45.11  1/1/2020 - Present Yes Hypertension ICD-10-CM: I10 
ICD-9-CM: 401.9  1/1/2020 - Present Yes Hypercholesterolemia ICD-10-CM: E78.00 ICD-9-CM: 272.0  1/1/2020 - Present Yes * Hospital Course:  
Abiola Amy is a 68yo AAM with PMH of ESRD on dialysis M-W-F, anemia, hypertension, chronic pain and hyperlilpidemia who was admitted 4 days ago with reports of abdominal pain. Initially w leukocytosis, and low grade fever. Initial CXR shows concern for mild pulmonary edema.  CT ABD showed no acute process other than constipation. Pt treated w empiric Metronidazole and Levaquin. Nephrology was consulted and pt w HD on Wednesday. However, during his stay, pt has been experiencing low blood pressures, and anti-hypertensives were held. He has intermittently been confused, and uncooperative. Removing IVs and refusing medications, with exception of his opioids. Today, patient is altered upon my arrival, and BP is 50's/30's. IV access was obtained after multiple attempts, and 1L NSS given. BP remained low, 50's/30's, so albumin and Levophed gtt ordered, and pt transferred to ED for monitoring. Once down in ED, pt w bradycardia, progressing to asystole. CPR was initiated and Epi x 2 given. Pt had ROSC and Levophed gtt was infusing, titrating for BP. He was given Sodium Bicarb x 1 amp, D50 x 1amp, Calcium Chloride, and Insulin 10 units. He again had bradicardia and loss of pulses. CPR initiated again and pt given Epi IV ROSC achieved. He was sedated and intubated per ER MD. Confirmed placement via CXR. Vascular team present, and Left Femoral TLC placed, again confirmed w Xray. BP remained low, so Epi gtt initiated. Unfortunately, pt again lost pulses, required CPR and addition of Dopamine gtt. Initial labs show elevated WBCs and rectal temp is 93. Jim hugger initiated and Vanc and Levaquin ordered, as well as BLD CX x2, and lazar placed and UA obtained. Suspect septic shock, with likely source, RACW hemodialysis catheter. [facility] reMail contacted, and Dr Fer Edouard at [facility] has kindly accepted patient. He will be transferred to bed 273 in the ICU. I attempted to contact his sister Rere Zuniga [phone], but was unsuccessful. No other family contact information found. * Procedures: * No surgery found * Consults:  
Nephrology GI Discharge Exam: 
General: Sedated, intubated Head:  Normocephalic, without obvious abnormality, atraumatic. Eyes:  Conjunctivae/corneas clear. Pupils equal, round, reactive to light. Lungs:  Rhonchi bilaterally. No wheeze, rales, crackles. 7.5 ETT in place w ventilator Chest wall: No tenderness or deformity. Heart:  Irregularly irregular - 110's-140's Abdomen:  Soft, Bowel sounds normal. No masses,  No organomegaly. Extremities: Extremities cool. No edema Skin: Skin color, texture, turgor normal. No rashes or lesions Neurologic: Sedated, intubated pt Labs: 
Recent Results (from the past 24 hour(s)) METABOLIC PANEL, BASIC Collection Time: 09/25/20  5:38 AM  
Result Value Ref Range Sodium 137 136 - 145 mmol/L Potassium 5.6 (H) 3.5 - 5.1 mmol/L Chloride 99 97 - 108 mmol/L  
 CO2 24 21 - 32 mmol/L Anion gap 14 5 - 15 mmol/L Glucose 178 (H) 65 - 100 mg/dL BUN 93 (H) 6 - 20 mg/dL Creatinine 7.49 (H) 0.70 - 1.30 mg/dL BUN/Creatinine ratio 12 12 - 20 GFR est AA 9 (L) >60 ml/min/1.73m2 GFR est non-AA 7 (L) >60 ml/min/1.73m2 Calcium 10.3 (H) 8.5 - 10.1 mg/dL Imaging: Xr Abd (kub) Result Date: 9/22/2020 Impression: Moderate to large burden of pancolonic stool with fecal rectal distention may indicate constipation if clinically correlated. Ct Abd Pelv Wo Cont Result Date: 9/21/2020 IMPRESSION: Cardiomegaly with bilateral pleural effusions and dependent changes in the lungs. Chronic or recurrent CHF? Indistinctness of the fat planes around the pancreas. There is chronic mild perinephric stranding, a nonspecific finding. The changes around the pancreas may be secondary to renal process. Correlate with laboratory data to exclude low-grade pancreatitis. Diverticulosis without evidence of diverticulitis. Extensive atherosclerotic vascular disease. Small amount of free fluid in the pelvis. This is an abnormal finding in a male but nonspecific. It could be secondary to any of the process is discussed above. Xr Chest [facility] Result Date: 9/20/2020 IMPRESSION: Vascular congestion suggests an element of fluid overload. Streaky airspace disease in both lower lung zones and at the left hilum may represent subsegmental atelectasis versus early infiltrate. No pneumothorax. * Discharge Condition: critical 
* Disposition: Lori Esqu[address] Transfer to acute care facility [facility] Discharge Medications: 
Current Discharge Medication List  
  
Continue Levophed, Dopamine, Epinephrine gtt Defer care of patient enroute to [facility] - Dr Willis Jiménez Pt is a FULL CODE * Follow-up Care/Patient Instructions: Activity: Bedrest 
Diet:NPO Follow-up Information None Signed: 
Sylvia Rodriguez NP 
9/25/2020 
5:52 PM

## 2020-09-25 NOTE — ED NOTES
5:15- patient went into cardiac arrest. Pulse not present. Compressions started by [facility] RN and code initiated. RT, Mustapha NP at bedside 5:17- 1 mg epi given at this time 5:19- pulse check, heart rate 97 
 
5:28 1 amp calcium chloride given by Mars Penn RN 
 
5:28 BG 98 
 
5:30 1 amp bicarb given by Mars Penn RN 
 
5:31 EKG being done, heart rate 131 
 
5:32 1 amp D50 given 5:33- levophed drip initiated at 20mg 5:36- 10 units IV insulin given 5:41 Compressions restarted 5:44 1 mg epi given 5:45 pulse check, heart rate 109 
 
5:46 5 mg versed and 100mg succinylcholine given by Mars Penn RN 
 
5:48 Patient intubated at this time by Dr. Bina Morfin. Tube placement verified by bilateral breath sounds, positive color change 5:50 NG tube placed at this time 5:59 PM 
Cardiac arrest. KALLIE Penn starting compressions. RT ventilating patient via BVM. 5:59 PM 
1mg epi given at this time. 6:06 PM 
Epi drip started at this time at 4mcg/min per Femoral line placed by Ester Swain. 5 FR tripple lumen left femoral artery. Levophed switched to femoral line. 6:14 PM 
Compressions restarted at this time 6:15 PM 
1 mg epi given by Mars Penn RN 
 
6:17 PM Heart rate 132 
 
6:19 PM 
1L NS started 6:22 PM 
[place] catheter being inserted by Monica Patel

## 2020-09-25 NOTE — PROGRESS NOTES
09/25/20 1945 Section 1: Provider Certification Patient Condition Stable for transfer Reason for transfer Patient examined and risks explained (Provider Certification) Qualifying Reason(s) for Transfer Service(s) needed unavailable. Benefits of Transfer Services available at receiving facility;Staff of receiving facility are capable of providing the level of care needed Risks of Transfer Time delay;Deterioration;Cardiac arrest;Death; Loss of IV; Accidents or delays; Worsening condition; Lower blood pressure Accepting Provider Dr Quan Lebron Sending Provider Maame Toro Physician to Physician Communication Yes (include both names in comments) Transport Mode ALS Ambulance;Critical care Acceptance Date 09/25/20 Acceptance Time 1910

## 2020-09-26 NOTE — PROGRESS NOTES
Vancomycin Note    Admission date [id number]   Attending Daniel Rayo   Indication         Height Ht Readings from Last 1 Encounters:   09/21/20 175.3 cm (69\")       Weight Wt Readings from Last 1 Encounters:   09/25/20 70.3 kg (154 lb 15.7 oz)      IBW Ideal body weight: 70.7 kg (155 lb 13.8 oz)    SCr Creatinine   Date Value Ref Range Status   09/25/2020 8.77 (H) 0.70 - 1.30 mg/dL Final   09/25/2020 7.49 (H) 0.70 - 1.30 mg/dL Final   09/24/2020 5.33 (H) 0.70 - 1.30 mg/dL Final      CrCl (based on IBW) 7.8 ml/min       Load/ER dose Vancomycin 1000 mg x 1   Current regimen Dosing per random levels   Random Scheduled for 9/26/20 @ 0400         Current Antimicrobial Therapy (168h ago, onward)      Ordered     Start Stop    09/26/20 0015  VANCOMYCIN INFORMATION NOTE  Other,   ONCE      09/26/20 0400 09/26/20 1559    09/26/20 0015  VANCOMYCIN INFORMATION NOTE 1 Each  1 Each,   Other,   RX DOSING/MONITORING      09/26/20 0015 --          HD patient (Mon/Wed/Fri) transferred from [address] [place] [address] to [facility] yesterday. He did receive an initial Vancomycin dose of 1000 mg x 1 dose prior to transfer. Per patient's RN, he missed his scheduled dialysis session yesterday and so he may be scheduled to have a make up session today. So will order a Pre-HD level this AM and redose if level is < 20 mcg/ml. Pharmacy will continue to monitor.       Submitted by: Dana Alfonso, [facility]

## 2020-09-26 NOTE — PROGRESS NOTES
Vancomycin Note 09/26/2020    Admission date [id number]   Attending Ramya Corporal   Indication         Height Ht Readings from Last 1 Encounters:   09/21/20 175.3 cm (69\")       Weight Wt Readings from Last 1 Encounters:   09/25/20 70.3 kg (154 lb 15.7 oz)      IBW Ideal body weight: 70.7 kg (155 lb 13.8 oz)    SCr Creatinine   Date Value Ref Range Status   09/26/2020 7.86 (H) 0.70 - 1.30 mg/dL Final   09/25/2020 8.77 (H) 0.70 - 1.30 mg/dL Final   09/25/2020 7.49 (H) 0.70 - 1.30 mg/dL Final      CrCl (based on IBW) 8.7 ml/min       Load/ER dose 1000 mg 09/25 @ 2000   Current regimen Pulse dosing - 500mg today   Trough Scheduled for 09/28 prior to dialysis         Current Antimicrobial Therapy (168h ago, onward)      Ordered     Start Stop    09/26/20 0852  vancomycin (VANCOCIN) 500 mg in 0.9% sodium chloride (MBP/ADV) 100 mL MBP  500 mg,   IntraVENous,   ONCE      09/26/20 1200 09/26/20 2359    09/26/20 0705  cefepime (MAXIPIME) 2 g in 0.9% sodium chloride (MBP/ADV) 100 mL MBP  2 g,   IntraVENous,   EVERY 24 HOURS      09/26/20 0800 --    09/26/20 0015  VANCOMYCIN INFORMATION NOTE 1 Each  1 Each,   Other,   RX DOSING/MONITORING      09/26/20 0015 --          Patient is MWF HD. 1000mg ov IV vancomycin was administered 09/25 @ 2000 which resulted in a vancomycin level of 17.9 in AM 09/26. Additional 500mg IV vancomycin has been ordered to be administered 09/26 @ 1200. This dose will raise the vancomycin level to approximately 25. Next lab ordered for 09/28 prior to scheduled HD. Pharmacy will continue to monitor daily and make necessary adjustments based on clinical status    Submitted by:  Nicki Page, [facility]

## 2020-09-26 NOTE — H&P
Internal Medicine  History and Physical    Jani Conroy  [id number]  1950     PCP:None  Other Specialists:    No chief complaint on file. HPI     78 y.o.male with a history of CKD on HD, anemia, HLD, hypertension admitted with who had initially presented to [facility] 2 days ago complaining of abdominal pain and found to be in fluid overload. While there he was mildly hypoxic with some leukocytosis but mainly treated with hemodialysis treatments she apparently did fine with until earlier today initially lost IV access, was unable to receive some of his medications, as the day progressed he became more more confused and agitated until early in the evening when he went into cardiac arrest.  A CODE BLUE was called and he was found to be in pulseless electrical activity, was was resuscitated with numerous medications and compressions achieved [place] then coded again the cycle continued for about an hour or so. Short time later he was again found to be hypotensive and transferred to the ED there where he again coded and was resuscitated. Acute arrested and was resuscitated with a total of about 5 times prior to his transfer here. Upon his arrival here he was sedated, intubated on norepinephrine, epinephrine and dopamine drip to maintain blood pressure.     Past Medical History:   Diagnosis Date    Anemia     Chronic kidney disease     Chronic pain     back and neck    Hypercholesterolemia     Hypertension         Past Surgical History:   Procedure Laterality Date    HX ARTERIOVENOUS FISTULA  07/01/2020    HX CHOLECYSTECTOMY      HX UROLOGICAL      bladder surgery          Current Facility-Administered Medications:     propofol (DIPRIVAN) 10 mg/mL infusion, 5 mcg/kg/min, IntraVENous, TITRATE, Stefanie Ambrocio MD    DOPamine (INTROPIN) 1,600 mcg/mL infusion (STANDARD), , , ,     [START ON 9/26/2020] sodium bicarbonate 8.4 % (1 mEq/mL) injection 100 mEq, 100 mEq, Katherine, Sis Garrett MD     [unfilled]    Social History     Tobacco Use    Smoking status: Former Smoker     Packs/day: 0.50     Years: 10.00     Pack years: 5.00     Last attempt to quit:      Years since quittin.7    Smokeless tobacco: Never Used   Substance Use Topics    Alcohol use: Not Currently       Family History   Problem Relation Age of Onset    Diabetes Mother        Allergies   Allergen Reactions    Penicillins Itching    Acetaminophen Itching    Codeine Itching        Review of Systems :     10 point ROS performed and negative except where otherwise noted in the HPI. Physical Exam :       Patient Vitals for the past 8 hrs:   Temp Pulse Resp SpO2   20 2323 -- (!) 129 12 100 %   20 2320 -- (!) 108 -- 100 %   20 2317 (!) 95 °F (35 °C) -- -- --     General: Sedated, intubated on the ventilator no acute distress.   HEENT: perrla, eomi, sclera anicteric, oropharynx clear without lesions, mucous membranes moist  Neck: supple, no lymphadenopathy, no thyromegaly, no JVD  Cardiovascular: regular rate and rhythm, no murmurs, rubs or gallops  Lungs: clear to auscultation bilaterally, without wheezing, rhonchi, or rales  Abdomen: soft, non-tender, non-distended; no palpable masses, normoactive bowel sounds, no rebound or guarding  Extremities: no clubbing, cyanosis, or edema  Neuro:  cranial nerves grossly intact, strength 5/5 in upper and lower extremities, sensation intact  Skin: no rashes or lesions noted  Heme/Lymph - no cervical, supraclavicular LAD  Other: tissue perfusion assessment performed - no signs hypoperfusion    Results :     Laboratory:   Recent Results (from the past 24 hour(s))   METABOLIC PANEL, BASIC    Collection Time: 20  5:38 AM   Result Value Ref Range    Sodium 137 136 - 145 mmol/L    Potassium 5.6 (H) 3.5 - 5.1 mmol/L    Chloride 99 97 - 108 mmol/L    CO2 24 21 - 32 mmol/L    Anion gap 14 5 - 15 mmol/L    Glucose 178 (H) 65 - 100 mg/dL BUN 93 (H) 6 - 20 mg/dL    Creatinine 7.49 (H) 0.70 - 1.30 mg/dL    BUN/Creatinine ratio 12 12 - 20      GFR est AA 9 (L) >60 ml/min/1.73m2    GFR est non-AA 7 (L) >60 ml/min/1.73m2    Calcium 10.3 (H) 8.5 - 10.1 mg/dL   CBC W/O DIFF    Collection Time: 09/25/20  6:00 PM   Result Value Ref Range    WBC 12.7 (H) 4.4 - 11.3 K/uL    RBC 2.79 (L) 4.50 - 5.90 M/uL    HGB 9.2 (L) 13.5 - 17.5 %    HCT 30.3 (L) 41 - 53 %    .5 (H) 80 - 100 FL    MCH 33.1 31 - 34 PG    MCHC 30.5 (L) 31.0 - 36.0 g/dL    RDW 18.9 (H) 11.5 - 14.5 %    PLATELET 694 K/uL    MPV 11.7 (H) 6.5 - 11.5 FL    NRBC 10.0  WBC    ABSOLUTE NRBC 5.04 K/uL   METABOLIC PANEL, COMPREHENSIVE    Collection Time: 09/25/20  6:00 PM   Result Value Ref Range    Sodium 139 136 - 145 mmol/L    Potassium 5.9 (H) 3.5 - 5.1 mmol/L    Chloride 100 97 - 108 mmol/L    CO2 12 (LL) 21 - 32 mmol/L    Anion gap 27 (H) 5 - 15 mmol/L    Glucose 214 (H) 65 - 100 mg/dL     (H) 6 - 20 mg/dL    Creatinine 8.77 (H) 0.70 - 1.30 mg/dL    BUN/Creatinine ratio 12 12 - 20      GFR est AA 7 (L) >60 ml/min/1.73m2    GFR est non-AA 6 (L) >60 ml/min/1.73m2    Calcium 11.1 (H) 8.5 - 10.1 mg/dL    Bilirubin, total 0.7 0.2 - 1.0 mg/dL    AST (SGOT) 304 (H) 15 - 37 U/L    ALT (SGPT) 144 (H) 12 - 78 U/L    Alk.  phosphatase 134 (H) 45 - 117 U/L    Protein, total 6.4 6.4 - 8.2 g/dL    Albumin 2.1 (L) 3.5 - 5.0 g/dL    Globulin 4.3 (H) 2.0 - 4.0 g/dL    A-G Ratio 0.5 (L) 1.1 - 2.2     TROPONIN I    Collection Time: 09/25/20  6:00 PM   Result Value Ref Range    Troponin-I, Qt. 0.96 (H) <0.05 ng/mL   PROTHROMBIN TIME + INR    Collection Time: 09/25/20  6:00 PM   Result Value Ref Range    Prothrombin time 15.7 (H) 9.0 - 11.1 sec    INR 1.6 (H) 0.9 - 1.1     PTT    Collection Time: 09/25/20  6:00 PM   Result Value Ref Range    aPTT 37.8 (H) 25.0 - 37.1 sec    aPTT, therapeutic range   68 - 109 sec   BLOOD GAS, ARTERIAL    Collection Time: 09/25/20  7:35 PM   Result Value Ref Range pH 7.195 (LL) 7.35 - 7.45      PCO2 25 (L) 35 - 45 mmHg    PO2 470 (H) 70 - 100 mmHg    BICARBONATE 10 (LL) 22 - 26 mmol/L    BASE DEFICIT 17.0 (H) 0 - 2 mmol/L    O2 METHOD VENT      FIO2 100.0 %    MODE SIMV      Tidal volume 500      SET RATE 18      SPONTANEOUS RATE 20      PRESSURE SUPPORT 10      EPAP/CPAP/PEEP 5      Sample source Arterial      SITE Left Brachial      ESE'S TEST PASS      Carboxy-Hgb 0.3 0 - 5 %    Methemoglobin 0.1 0 - 5 %    tHb 11.2 (L) 13.5 - 17.5 g/dL    Oxyhemoglobin 99.3 95 - 100 %   BLOOD GAS, ARTERIAL    Collection Time: 09/25/20 11:34 PM   Result Value Ref Range    pH 7.43 7.35 - 7.45      PCO2 28 (L) 35 - 45 mmHg    PO2 161 (H) 75 - 100 mmHg    O2  >95 %    BICARBONATE 20 (L) 22 - 26 mmol/L    BASE DEFICIT 4.9 (H) 0 - 2 mmol/L    O2 METHOD Ventricular catheter tip      FIO2 35 %    Tidal volume 500      EPAP/CPAP/PEEP 5      SITE Right Radial      ESE'S TEST Passively acquired         Lab results reviewed and personally interpreted by myself    Imaging/Studies:  XR ABD (KUB)  Narrative: Femoral line. Comparison abdomen x-ray 9/22/2020. Impression: FINDINGS: Impression: 2 frontal views of the abdomen. Left femoral catheter distal tip IVC /RA junction region. Correlate clinically,  recommend pulling back. Called report to Dr Yazan Luis at 656pm 9/25/2020. Enteric tube distal tip at the GE junction, consider advancing. No gas dilated loops of bowel. Surgical staples right upper abdomen. Calcific atherosclerosis. Bony skeleton grossly intact. Lung bases clear. XR CHEST PORT  Narrative: Apnea. Comparison chest x-ray 9/20/2020. Impression: FINDINGS: Impression: Frontal single view chest.    ET tube distal tip 2 cm above lizeth. Enteric tube extends below left  hemidiaphragm, side-port is at the GE junction; consider advancing. Right  central catheter distal tip SVC/RA junction. Normal heart size.  Mild vascular congestion, and hydrostatic edema.    The lungs are hypoinflated. There is relative opacity in the right upper lung. No large effusion. No pneumothorax.        Chest x-ray personally reviewed and interpreted by myself    Hospital Problems  Reviewed: 8/21/2020 11:01 AM by Fany Hilario MD          Codes Priority Class Noted - Resolved POA    Cardiac arrest [facility]) ICD-10-CM: I46.9  ICD-9-CM: 427.5   9/25/2020 - Present Unknown     Entered by Jennifer Costa MD      Non-Hospital Problems  Reviewed: 8/21/2020 11:01 AM by Fany Hilario MD          Codes Priority Class Noted - Resolved    Kidney disease, chronic, end stage on dialysis [facility]) ICD-10-CM: N18.6, Z99.2  ICD-9-CM: 585.6, V45.11   1/1/2020 - Present     Entered by Kirsten Jean LPN    Hypertension [id number]: I10  ICD-9-CM: 401.9   1/1/2020 - Present     Entered by Kirsten Jean LPN    Hypercholesterolemia ICD-10-CM: E78.00  ICD-9-CM: 272.0   1/1/2020 - Present     Entered by Kirsten Jean LPN    AV fistula occlusion, sequela ICD-10-CM: T82.898S  ICD-9-CM: 909.3   7/28/2020 - Present     Entered by Fany Hilario MD    Dialysis AV fistula malfunction [facility]) ICD-10-CM: W35.839U  ICD-9-CM: 996.1   8/21/2020 - Present     Entered by Fany Hilario MD    ESRD (end stage renal disease) on dialysis [facility]) ICD-10-CM: N18.6, Z99.2  ICD-9-CM: 585.6, V45.11   9/21/2020 - Present     Entered by Marycarmen Torrez MD    Fluid overload ICD-10-CM: E87.70  ICD-9-CM: 276.69   9/21/2020 - Present     Entered by Marycarmen Torrez MD    Abdominal pain ICD-10-CM: R10.9  ICD-9-CM: 789.00   9/21/2020 - Present     Entered by Marycarmen Torrez MD    Anemia ICD-10-CM: D64.9  ICD-9-CM: 285.9   9/21/2020 - Present     Entered by Marycarmen Torrez MD    Chronic pain ICD-10-CM: Z38.16  ICD-9-CM: 338.29   9/21/2020 - Present     Entered by Marycarmen Torrez MD    Gastritis ICD-10-CM: K29.70  ICD-9-CM: 535.50   9/21/2020 - Present     Entered by Marycarmen Torrez MD    Constipation ICD-10-CM: K59.00  ICD-9-CM: 564.00   9/23/2020 - Present     Entered by Bina Miles MD           Assessment & Plan    #Cardiac arrest  Patient went to cardiac arrest while at the outside hospital, requiring numerous pressors, supportive medications on compressions were he arrested and was resuscitated total of 5 times prior to transfer. He is currently on the ventilator on AC/VC mode, with FiO2 of 35, PEEP of 5 tidal volume of 500 and is oxygenating well. Blood pressure is maintained, currently on norepinephrine, epinephrine and dopamine infusions. Propofol initiated for sedation. Initial ABG on admission with a pH of 7.19, CO2 20, bicarb 10. After being admitted here and placed on the ventilator, repeat ABG with significant improvement, pH 7.43, CO2 28, O2 161, bicarb 20. #Sepsis  #Pneumonia  Sepsis of unknown etiology. However chest x-ray with findings of a relative opacity in the right upper lung and mild vascular congestion with hydrostatic edema. Empirically started on broad-spectrum antibiotics to cover for both, with vancomycin and cefepime. #ESRD on HD  Will likely need to continue on HD here during/when he recovers. Nephrology consulted      DVT prophylaxis: VTE: Heparin    Nutrition: NPO   Code status: Full    Disposition:   1) Patient will be: admitted as an inpatient. I certify that the patient requires inpatient hospital services that is expected to include at least 2 midnights due to the following diagnoses: Cardiac arrest.  2) Patient will be admitted to the intensive care unit. 3) Evaluation/management as per above. 4) Disposition will be adjusted throughout the clinical encounter pending progression of clinical symptoms and of aforementioned evaluation. Patient's care discussed with:  Patient  ED Attending      Nursing       Electronic Signature:    Greater than 30 minutes of critical care time spent caring for the patient.     Signed:  Porter Funes MD, MD  Service: Hospitalist Service  9/25/2020  11:53 PM

## 2020-09-26 NOTE — PROGRESS NOTES
Comprehensive Nutrition Assessment    Type and Reason for Visit: Initial(Ventilated, TF)    Nutrition Recommendations/Plan:   As medically able with hemodynamic stability and enteral access, consider initiating the following:  TF of Nepro at 20ml/hr continuous  Advance by 10ml q4h to goal of 33ml/hr   Add 2 pkts prosource BID, 1 pkt in evening (5 pkt total; 300kcals, 75g pro)  Free water flushes of 200ml q4h. Goal feeds provide 1726kcals, 139 g pro, and 1776ml fluids (meets 98% EENs, 99% protein needs, and 100% fluid needs)  *Propofol provides 111kcals extra/day (104% of EENs)    Adjust insulin to promote euglycemia  Please document TF and water flushes in I/O    Nutrition Assessment:  Transfer[facility] d/t loss of IV access making pt unable to receive medications progressing into cardiac arrest with resuscitation x5, intubated and sedated on arrival. On x3 pressor. NGT placed, needs advancing per KUB, removed this AM. Per RN pt condition worsening, starting CRRT today. RD to leave TF recs as needed once enteral access placed, however RD recognizes pt unlikely to begin feeding within next 48 hrs.  Labs: H/H 9.2/7.9, K+ 5.5, , Cr 7.86, Glu 238, , , Alk Phos 125, Mg 2.6; Meds: LR at 100ml/hr, propofol,  Dopamine, Epinephrine, Norepinephrine, Cefepime, docusate, FeSu, heparine, MgSu, mirtazapine, PPI, renvela, Bicarb, vancomycin, PRN miralax,    Malnutrition Assessment:  Malnutrition Status:  Mild malnutrition    Context:  Acute illness     Findings of the 6 clinical characteristics of malnutrition:   Energy Intake:  7 - 50% or less of est energy requirements for 5 or more days  Weight Loss:  No significant weight loss     Body Fat Loss:  Unable to assess,     Muscle Mass Loss:  Unable to assess,    Fluid Accumulation:  No significant fluid accumulation,      Estimated Daily Nutrient Needs:  Energy (kcal):  1767kcals([id number])  Protein (g):  141g(2.0g/kg)  Per SCCM      Fluid (ml/day): 1767ml(1ml/kcal)    Needs while ventilated with ESRD now starting CRRT    Nutrition Related Findings:  No edema. Noted abd distended, likely d/t constipation, considering last BM 9/23. Wounds:    (BRITTANIE, no available wound assessment)       Current Nutrition Therapies:   DIET NPO  Current Tube Feeding (TF) Orders:   · Goal TF & Flush Orders Provides: rec'd TF of Nepro via NGT at goal 38ml/hr continuous, 2pkt prosource daily, 175ml water flush q4h; providing 1761kcals, 104 g pro, and 1713ml fluids    Anthropometric Measures:  · Height:  5' 9.02\" (175.3 cm)  · Current Body Wt:  70.3 kg (154 lb 15.7 oz)(9/25)   · Admission Body Wt:  154 lb 15.7 oz(9/25)    · Usual Body Wt:  ([facility])     · Ideal Body Wt:  160 lbs:  96.9 % (Geriatric IBW 76.8kg; 92% IBW)  · BMI Category:  Normal weight (BMI 22.0-24.9) age over 72     Wt hx: 68.6kg (9/24), 70.2kg (9/21)  Pt fairly wt stable x1 week    Nutrition Diagnosis:   · Inadequate oral intake related to impaired respiratory function as evidenced by intubation      Nutrition Interventions:   Food and/or Nutrient Delivery: Continue NPO, Start tube feeding(As pt becomes hemodynamically stable)  Nutrition Education and Counseling: No recommendations at this time  Coordination of Nutrition Care: Continued inpatient monitoring    Goals:  Intakes >/=75% of EENs via TF within 5 days, Wt maintenance within +/-0.5kg in 7 days       Nutrition Monitoring and Evaluation:   Food/Nutrient Intake Outcomes: Enteral nutrition intake/tolerance, Diet advancement/tolerance  Physical Signs/Symptoms Outcomes: Constipation, Weight, Hemodynamic status    Discharge Planning:     Too soon to determine     Electronically signed by Ellis Angela RD on 9/26/2020 at 9:19 AM    Contact: Ext [phone]

## 2020-09-26 NOTE — CONSULTS
Consult Date: 2020    Consults nephrology-for ESRD dependent on hemodialysis    Subjective  /History of presenting illness:  Patient is a 70-year-old -American male with a history of ESRD dependent on hemodialysis, diabetes mellitus, chronic CHF, chronic pain, history of noncompliance, history of access infections who is transferred from [address] [place] [address] [address] because of severe shock after several episodes of cardiac arrest requiring CPR. He is dialysis dependent and usually dialysis on Monday, Wednesday and Friday schedule. He did not receive his treatment on Friday. At [place] [id number], he became severely hypotensive and was resuscitated for cardiac arrest.  After ROSC, he was started on multiple drips and placed on the ventilator.   He is been started Ringer lactate IV fluid as well    Past Medical History:   Diagnosis Date    Anemia     Chronic kidney disease     Chronic pain     back and neck    Hypercholesterolemia     Hypertension    ESRD on hemodialysis,  and Friday schedule  Past Surgical History:   Procedure Laterality Date    HX ARTERIOVENOUS FISTULA  2020    HX CHOLECYSTECTOMY      HX UROLOGICAL      bladder surgery     Family History   Problem Relation Age of Onset    Diabetes Mother       Social History     Tobacco Use    Smoking status: Former Smoker     Packs/day: 0.50     Years: 10.00     Pack years: 5.00     Last attempt to quit:      Years since quittin.7    Smokeless tobacco: Never Used   Substance Use Topics    Alcohol use: Not Currently       Current Facility-Administered Medications   Medication Dose Route Frequency Provider Last Rate Last Dose    sodium chloride (NS) flush 5-40 mL  5-40 mL IntraVENous Q8H Eliud Beckford MD   10 mL at 20 0503    sodium chloride (NS) flush 5-40 mL  5-40 mL IntraVENous PRN Eliud Beckford MD        oxyCODONE IR (ROXICODONE) tablet 5 mg  5 mg Oral Q8H PRN Eliud Beckford MD        ferrous sulfate tablet 325 mg  1 Tab Oral BID WITH MEALS Sanjuanita Miles MD   Stopped at 09/26/20 0800    pantoprazole (PROTONIX) tablet 40 mg  40 mg Oral ACB Sanjuanita Miles MD   Stopped at 09/26/20 0730    atorvastatin (LIPITOR) tablet 40 mg  40 mg Oral QHS Sanjuanita Miles MD   Stopped at 09/26/20 0100    carvediloL (COREG) tablet 25 mg  25 mg Oral BID WITH MEALS Sanjuanita Miles MD   Stopped at 09/26/20 0800    mirtazapine (REMERON) tablet 15 mg  15 mg Oral QHS Sanjuanita Miles MD   Stopped at 09/26/20 0100    brimonidine (ALPHAGAN) 0.2 % ophthalmic solution 1 Drop  1 Drop Both Eyes TID Sanjuanita Miles MD   1 Drop at 09/26/20 0941    dorzolamide (TRUSOPT) 2 % ophthalmic solution 1 Drop  1 Drop Both Eyes TID Sanjuanita Miles MD   1 Drop at 09/26/20 2167    docusate sodium (COLACE) capsule 100 mg  100 mg Oral BID Sanjuanita Miles MD   Stopped at 09/26/20 0100    polyethylene glycol (MIRALAX) packet 17 g  17 g Oral DAILY Sanjuanita Miles MD   Stopped at 09/26/20 0900    sevelamer carbonate (RENVELA) tab 2,400 mg  2,400 mg Oral TID WITH MEALS Sanjuanita Miles MD   Stopped at 09/26/20 0800    VANCOMYCIN INFORMATION NOTE 1 Each  1 Each Other Rx Dosing/Monitoring Sanjuanita Miles MD        EPINEPHrine (ADRENALIN) 5 mg in 0.9% sodium chloride 250 mL infusion  0-10 mcg/min IntraVENous TITRATE Sanjuanita Miles MD 6 mL/hr at 09/26/20 0109 2 mcg/min at 09/26/20 0109    DOPamine (INTROPIN) 400 mg in dextrose 5% 250 mL infusion  0-50 mcg/kg/min IntraVENous TITRATE Sanjuanita Miles MD 51.5 mL/hr at 09/26/20 0944 20 mcg/kg/min at 09/26/20 0944    sodium chloride (NS) flush 5-40 mL  5-40 mL IntraVENous Q8H Sanjuanita Miles MD   10 mL at 09/26/20 0503    sodium chloride (NS) flush 5-40 mL  5-40 mL IntraVENous PRN Sanjuanita Miles MD        acetaminophen (TYLENOL) tablet 650 mg  650 mg Oral Q6H PRN Sanjuanita Miles MD        Or   Dannielle acetaminophen (TYLENOL) suppository 650 mg  650 mg Rectal Q6H PRN MD Dannielle Noble polyethylene glycol (MIRALAX) packet 17 g  17 g Oral DAILY PRN Rustam Turner MD        promethazine (PHENERGAN) tablet 12.5 mg  12.5 mg Oral Q6H PRN [place] Coma, MD        Or    ondansetron [facility]) injection 4 mg  4 mg IntraVENous Q6H PRN [place] Coma, MD        potassium chloride 10 mEq in 100 ml IVPB  20 mEq IntraVENous PRN [place] Coma, MD        magnesium sulfate 2 g/50 ml IVPB (premix or compounded)  2 g IntraVENous PRN [place] Coma, MD        albuterol-ipratropium (DUO-NEB) 2.5 MG-0.5 MG/3 ML  3 mL Nebulization Q6H RT Rustam Turner MD   3 mL at 09/26/20 0736    NOREPINephrine (LEVOPHED) 8 mg in 5% dextrose 250mL (32 mcg/mL) infusion  0.5-16 mcg/min IntraVENous TITRATE Rustam Turner MD 5.6 mL/hr at 09/26/20 1151 3 mcg/min at 09/26/20 1151    heparin porcine injection 5,000 Units  5,000 Units SubCUTAneous Q8H Rustam Turner MD   Stopped at 09/26/20 0950    lactated Ringers infusion  100 mL/hr IntraVENous CONTINUOUS Rustam Turner  mL/hr at 09/26/20 0913 100 mL/hr at 09/26/20 0913    cefepime (MAXIPIME) 2 g in 0.9% sodium chloride (MBP/ADV) 100 mL MBP  2 g IntraVENous Q24H Rustam Turner  mL/hr at 09/26/20 0954 2 g at 09/26/20 0954    vancomycin (VANCOCIN) 500 mg in 0.9% sodium chloride (MBP/ADV) 100 mL MBP  500 mg IntraVENous ONCE Bill Harness,  mL/hr at 09/26/20 1146 500 mg at 09/26/20 1146    bicarbonate dialysis (PRISMASOL) BG K 2/Ca 3.5 5000 ml solution   Extracorporeal DIALYSIS CONTINUOUS Zaire Fernandez MD        propofol (DIPRIVAN) 10 mg/mL infusion  5 mcg/kg/min IntraVENous TITRATE Rustam Turner MD 4.1 mL/hr at 09/26/20 0040 10 mcg/kg/min at 09/26/20 0040        Review of Systems    Objective     Vital signs for last 24 hours:  Visit Vitals  BP (!) 149/76   Pulse 98   Temp 99 °F (37.2 °C)   Resp 12   Ht 5' 9.02\" (1.753 m)   Wt 70.3 kg (154 lb 15.7 oz)   SpO2 100%   BMI 22.88 kg/m²       Intake/Output this shift:  Current Shift: No intake/output data recorded. Last 3 Shifts: No intake/output data recorded. Physical Exam     Data Review:   Recent Results (from the past 24 hour(s))   CBC W/O DIFF    Collection Time: 09/25/20  6:00 PM   Result Value Ref Range    WBC 12.7 (H) 4.4 - 11.3 K/uL    RBC 2.79 (L) 4.50 - 5.90 M/uL    HGB 9.2 (L) 13.5 - 17.5 %    HCT 30.3 (L) 41 - 53 %    .5 (H) 80 - 100 FL    MCH 33.1 31 - 34 PG    MCHC 30.5 (L) 31.0 - 36.0 g/dL    RDW 18.9 (H) 11.5 - 14.5 %    PLATELET 962 K/uL    MPV 11.7 (H) 6.5 - 11.5 FL    NRBC 10.0  WBC    ABSOLUTE NRBC 3.85 K/uL   METABOLIC PANEL, COMPREHENSIVE    Collection Time: 09/25/20  6:00 PM   Result Value Ref Range    Sodium 139 136 - 145 mmol/L    Potassium 5.9 (H) 3.5 - 5.1 mmol/L    Chloride 100 97 - 108 mmol/L    CO2 12 (LL) 21 - 32 mmol/L    Anion gap 27 (H) 5 - 15 mmol/L    Glucose 214 (H) 65 - 100 mg/dL     (H) 6 - 20 mg/dL    Creatinine 8.77 (H) 0.70 - 1.30 mg/dL    BUN/Creatinine ratio 12 12 - 20      GFR est AA 7 (L) >60 ml/min/1.73m2    GFR est non-AA 6 (L) >60 ml/min/1.73m2    Calcium 11.1 (H) 8.5 - 10.1 mg/dL    Bilirubin, total 0.7 0.2 - 1.0 mg/dL    AST (SGOT) 304 (H) 15 - 37 U/L    ALT (SGPT) 144 (H) 12 - 78 U/L    Alk.  phosphatase 134 (H) 45 - 117 U/L    Protein, total 6.4 6.4 - 8.2 g/dL    Albumin 2.1 (L) 3.5 - 5.0 g/dL    Globulin 4.3 (H) 2.0 - 4.0 g/dL    A-G Ratio 0.5 (L) 1.1 - 2.2     TROPONIN I    Collection Time: 09/25/20  6:00 PM   Result Value Ref Range    Troponin-I, Qt. 0.96 (H) <0.05 ng/mL   PROTHROMBIN TIME + INR    Collection Time: 09/25/20  6:00 PM   Result Value Ref Range    Prothrombin time 15.7 (H) 9.0 - 11.1 sec    INR 1.6 (H) 0.9 - 1.1     PTT    Collection Time: 09/25/20  6:00 PM   Result Value Ref Range    aPTT 37.8 (H) 25.0 - 37.1 sec    aPTT, therapeutic range   68 - 109 sec   BLOOD GAS, ARTERIAL    Collection Time: 09/25/20  7:35 PM   Result Value Ref Range    pH 7.195 (LL) 7.35 - 7.45      PCO2 25 (L) 35 - 45 mmHg    PO2 470 (H) 70 - 100 mmHg    BICARBONATE 10 (LL) 22 - 26 mmol/L    BASE DEFICIT 17.0 (H) 0 - 2 mmol/L    O2 METHOD VENT      FIO2 100.0 %    MODE SIMV      Tidal volume 500      SET RATE 18      SPONTANEOUS RATE 20      PRESSURE SUPPORT 10      EPAP/CPAP/PEEP 5      Sample source Arterial      SITE Left Brachial      ESE'S TEST PASS      Carboxy-Hgb 0.3 0 - 5 %    Methemoglobin 0.1 0 - 5 %    tHb 11.2 (L) 13.5 - 17.5 g/dL    Oxyhemoglobin 99.3 95 - 100 %   BLOOD GAS, ARTERIAL    Collection Time: 09/25/20 11:34 PM   Result Value Ref Range    pH 7.43 7.35 - 7.45      PCO2 28 (L) 35 - 45 mmHg    PO2 161 (H) 75 - 100 mmHg    O2  >95 %    BICARBONATE 20 (L) 22 - 26 mmol/L    BASE DEFICIT 4.9 (H) 0 - 2 mmol/L    O2 METHOD Ventricular catheter tip      FIO2 35 %    Tidal volume 500      EPAP/CPAP/PEEP 5      SITE Right Radial      ESE'S TEST Passively acquired     METABOLIC PANEL, COMPREHENSIVE    Collection Time: 09/26/20  4:26 AM   Result Value Ref Range    Sodium 137 136 - 145 mmol/L    Potassium 5.5 (H) 3.5 - 5.1 mmol/L    Chloride 100 97 - 108 mmol/L    CO2 23 21 - 32 mmol/L    Anion gap 14 5 - 15 mmol/L    Glucose 238 (H) 65 - 100 mg/dL     (H) 6 - 20 mg/dL    Creatinine 7.86 (H) 0.70 - 1.30 mg/dL    BUN/Creatinine ratio 14 12 - 20      GFR est AA 8 (L) >60 ml/min/1.73m2    GFR est non-AA 7 (L) >60 ml/min/1.73m2    Calcium 9.7 8.5 - 10.1 mg/dL    Bilirubin, total 0.9 0.2 - 1.0 mg/dL    AST (SGOT) 321 (H) 15 - 37 U/L    ALT (SGPT) 160 (H) 12 - 78 U/L    Alk.  phosphatase 125 (H) 45 - 117 U/L    Protein, total 6.7 6.4 - 8.2 g/dL    Albumin 2.1 (L) 3.5 - 5.0 g/dL    Globulin 4.6 (H) 2.0 - 4.0 g/dL    A-G Ratio 0.5 (L) 1.1 - 2.2     LACTIC ACID    Collection Time: 09/26/20  4:26 AM   Result Value Ref Range    Lactic acid 2.8 (HH) 0.4 - 2.0 mmol/L   MAGNESIUM    Collection Time: 09/26/20  4:26 AM   Result Value Ref Range    Magnesium 2.6 (H) 1.6 - 2.4 mg/dL   CBC W/O DIFF    Collection Time: 09/26/20  4:26 AM Result Value Ref Range    WBC 16.2 (H) 4.1 - 11.1 K/uL    RBC 2.90 (L) 4.10 - 5.70 M/uL    HGB 9.2 (L) 12.1 - 17.0 %    HCT 27.9 (L) 36.6 - 50.3 %    MCV 96.2 80.0 - 99.0 FL    MCH 31.7 26.0 - 34.0 PG    MCHC 33.0 30.0 - 36.5 g/dL    RDW 17.6 (H) 11.5 - 14.5 %    PLATELET 786 744 - 602 K/uL    MPV 13.3 (H) 8.9 - 12.9 FL   PROTHROMBIN TIME + INR    Collection Time: 09/26/20  4:26 AM   Result Value Ref Range    Prothrombin time 19.2 (H) 11.9 - 14.7 sec    INR 1.6 (H) 0.9 - 1.1     VANCOMYCIN, RANDOM    Collection Time: 09/26/20  4:26 AM   Result Value Ref Range    Vancomycin, random 17.9 ug/mL    Reported dose date 0      Reported dose: 0 Units   CULTURE, MRSA    Collection Time: 09/26/20  8:45 AM    Specimen: Nares; Nasal   Result Value Ref Range    Special Requests: No Special Requests      Culture result:        MRSA detected CALLED TO AND READ BACK BY Valentina Rivero R.N. 09/26/20 @ 1040 A. M. General: Unresponsive, eyes closed  Skin: No rash, no erythema  HEENT: Sclerae anicteric. NG tube, ET tube  Cardiovascular: Narrow complex QRS  Respiratory: Vent dependent with FiO2 40%  GI: Abdomen nondistended, soft, and nontender  Neurological: Unresponsive  Access: Right internal jugular tunneled venous catheter, some crusting noted at exit site. Dressing is intact.   Left femoral triple-lumen catheter placed  XR CHEST PORT    (Results Pending)        Patient Active Problem List   Diagnosis Code    Kidney disease, chronic, end stage on dialysis ([facility] [address] N18.6, Z99.2    Hypertension I10    Hypercholesterolemia E78.00    AV fistula occlusion, sequela T82.898S    Dialysis AV fistula malfunction (HCC) T82.590A    ESRD (end stage renal disease) on dialysis (HCC) N18.6, Z99.2    Fluid overload E87.70    Abdominal pain R10.9    Anemia D64.9    Chronic pain G89.29    Gastritis K29.70    Constipation K59.00    Cardiac arrest (HCC) I46.9   · end-stage renal disease dependent on hemodialysis  · Shock with multiorgan failure multiple pressor dependent  · Encephalopathy  · Ventilator dependent respiratory  · Status post cardiac arrest x4  · Nonadherence to treatment  · Septic shock  · Hyperkalemia  · Severe metabolic acidosis  Plan: We will attempt to start him on CRRT. This will help lower potassium and equalized bicarbonate. Discontinue Ringer lactate drip at this time. Attempt to remove no fluids while on dialysis. If blood pressure rises and we will wean him off his pressor drips. We will use his dialysis access for CRRT. Blood cultures from the dialysis catheter was sent. Patient is critically sick.   Consent for his treatments were obtained from his sister in [place] who is listed as next of kin  Thank you for consulting me

## 2020-09-26 NOTE — CONSULTS
PULMONARY CONSULT  [facility]    Name: Celeste Mayer MRN: [id number]   : 1950 Hospital: [facility]   Date: 2020  Admission date: 2020 Hospital Day: 2       HPI:     Hospital Problems  Date Reviewed: 2020          Codes Class Noted POA    Cardiac arrest [facility]) ICD-10-CM: I46.9  ICD-9-CM: 427.5  2020 Unknown                   [x] High complexity decision making was performed  [x] See my orders for details      Subjective/Initial History:     I was asked by Gil Francis MD to see Celeste Mayer  a 78 y.o.  male in consultation     Excerpts from admission 2020 or consult notes as follows:   77-year-old male transferred from [facility] as he was admitted there because of generalized weakness shortness of breath abdominal pain and fluid overload she was a renal failure getting hemodialysis.   Patient subsequently coded cardiac arrest ACLS protocol was done he was found to be in PEA patient revived intubated on ventilator he has cardiac arrest at least 4 times now he is on vasopressors dopamine levo fed 7 also on vancomycin Maxipime so critical care consult was called for further evaluation    Allergies   Allergen Reactions    Penicillins Itching    Acetaminophen Itching    Codeine Itching        MAR reviewed and pertinent medications noted or modified as needed     Current Facility-Administered Medications   Medication    sodium chloride (NS) flush 5-40 mL    sodium chloride (NS) flush 5-40 mL    oxyCODONE IR (ROXICODONE) tablet 5 mg    ferrous sulfate tablet 325 mg    pantoprazole (PROTONIX) tablet 40 mg    atorvastatin (LIPITOR) tablet 40 mg    carvediloL (COREG) tablet 25 mg    mirtazapine (REMERON) tablet 15 mg    brimonidine (ALPHAGAN) 0.2 % ophthalmic solution 1 Drop    dorzolamide (TRUSOPT) 2 % ophthalmic solution 1 Drop    docusate sodium (COLACE) capsule 100 mg    polyethylene glycol (MIRALAX) packet 17 g    sevelamer carbonate (RENVELA) tab 2,400 mg    VANCOMYCIN INFORMATION NOTE 1 Each    EPINEPHrine (ADRENALIN) 5 mg in 0.9% sodium chloride 250 mL infusion    DOPamine (INTROPIN) 400 mg in dextrose 5% 250 mL infusion    sodium chloride (NS) flush 5-40 mL    sodium chloride (NS) flush 5-40 mL    acetaminophen (TYLENOL) tablet 650 mg    Or    acetaminophen (TYLENOL) suppository 650 mg    polyethylene glycol (MIRALAX) packet 17 g    promethazine (PHENERGAN) tablet 12.5 mg    Or    ondansetron (ZOFRAN) injection 4 mg    potassium chloride 10 mEq in 100 ml IVPB    magnesium sulfate 2 g/50 ml IVPB (premix or compounded)    albuterol-ipratropium (DUO-NEB) 2.5 MG-0.5 MG/3 ML    NOREPINephrine (LEVOPHED) 8 mg in 5% dextrose 250mL (32 mcg/mL) infusion    heparin porcine injection 5,000 Units    lactated Ringers infusion    cefepime (MAXIPIME) 2 g in 0.9% sodium chloride (MBP/ADV) 100 mL MBP    vancomycin (VANCOCIN) 500 mg in 0.9% sodium chloride (MBP/ADV) 100 mL MBP    propofol (DIPRIVAN) 10 mg/mL infusion    sodium bicarbonate 8.4 % (1 mEq/mL) injection 100 mEq      Patient PCP: None  PMH:  has a past medical history of Anemia, Chronic kidney disease, Chronic pain, Hypercholesterolemia, and Hypertension. PSH:   has a past surgical history that includes hx urological; hx arteriovenous fistula (07/01/2020); and hx cholecystectomy. FHX: family history includes Diabetes in his mother. SHX:  reports that he quit smoking about 24 years ago. He has a 5.00 pack-year smoking history. He has never used smokeless tobacco. He reports previous alcohol use. He reports previous drug use.      ROS:  Unable to obtain as patient is intubated on ventilator      Objective:     Vital Signs: Telemetry:    normal sinus rhythm Intake/Output:   Visit Vitals  BP (!) 146/80   Pulse 97   Temp 99 °F (37.2 °C)   Resp 12   Ht 5' 9.02\" (1.753 m)   Wt 70.3 kg (154 lb 15.7 oz)   SpO2 100%   BMI 22.88 kg/m² Temp (24hrs), Av.4 °F (35.8 °C), Min:93.2 °F (34 °C), Max:100.2 °F (37.9 °C)        O2 Device: Ventilator         Wt Readings from Last 4 Encounters:   20 70.3 kg (154 lb 15.7 oz)   20 68.6 kg (151 lb 4.8 oz)   20 74.8 kg (165 lb)   20 74.4 kg (164 lb)        No intake or output data in the 24 hours ending 20 09    Last shift:      No intake/output data recorded. Last 3 shifts: No intake/output data recorded. Physical Exam:     Physical Exam   Constitutional: He appears distressed. HENT:   Head: Normocephalic and atraumatic. Eyes: Pupils are equal, round, and reactive to light. Conjunctivae are normal.   Neck: Normal range of motion. Cardiovascular: Normal rate, regular rhythm and normal heart sounds. Pulmonary/Chest: He is in respiratory distress. He has rales. Abdominal: Soft. Bowel sounds are normal.   Musculoskeletal: Normal range of motion. Skin: He is diaphoretic.    Intubated on ventilator    Labs:    Recent Labs     20  1800 20  0538   WBC 16.2* 12.7* 9.9   HGB 9.2* 9.2* 9.3*    119 125   INR 1.6* 1.6*  --    APTT  --  37.8*  --      Recent Labs     20  1800 20  0538 20  0538    139 137 137   K 5.5* 5.9* 5.6* 4.8    100 99 100   CO2 23 12* 24 27   * 214* 178* 152*   * 106* 93* 52*   CREA 7.86* 8.77* 7.49* 5.33*   CA 9.7 11.1* 10.3* 10.1   MG 2.6*  --   --   --    PHOS  --   --   --  8.3*   LAC 2.8*  --   --   --    ALB 2.1* 2.1*  --  2.4*   * 144*  --   --      Recent Labs     20  2334 20  1935   PH 7.43 7.195*   PCO2 28* 25*   PO2 161* 470*   HCO3 20* 10*   FIO2 35 100.0     Recent Labs     20  1800   TROIQ 0.96*     No results found for: BNPP, BNP   No results found for: CULTNo results found for: TSH, TSHEXT    Imaging:    CXR Results  (Last 48 hours)               20 1813  XR CHEST PORT Final result    Impression:  FINDINGS: Impression: Frontal single view chest.       ET tube distal tip 2 cm above lizeth. Enteric tube extends below left   hemidiaphragm, side-port is at the GE junction; consider advancing. Right   central catheter distal tip SVC/RA junction. Normal heart size. Mild vascular congestion, and hydrostatic edema. The lungs are hypoinflated. There is relative opacity in the right upper lung. No large effusion. No pneumothorax. Narrative:  Apnea. Comparison chest x-ray 9/20/2020. Results from [place] encounter on 09/21/20   XR ABD (KUB)    Narrative Femoral line. Comparison abdomen x-ray 9/22/2020. Impression FINDINGS: Impression: 2 frontal views of the abdomen. Left femoral catheter distal tip IVC /RA junction region. Correlate clinically,  recommend pulling back. Called report to Dr Carlene Aguilar at 656pm 9/25/2020. Enteric tube distal tip at the GE junction, consider advancing. No gas dilated loops of bowel. Surgical staples right upper abdomen. Calcific atherosclerosis. Bony skeleton grossly intact. Lung bases clear. XR CHEST PORT    Narrative Apnea. Comparison chest x-ray 9/20/2020. Impression FINDINGS: Impression: Frontal single view chest.    ET tube distal tip 2 cm above lizeth. Enteric tube extends below left  hemidiaphragm, side-port is at the GE junction; consider advancing. Right  central catheter distal tip SVC/RA junction. Normal heart size. Mild vascular congestion, and hydrostatic edema. The lungs are hypoinflated. There is relative opacity in the right upper lung. No large effusion. No pneumothorax. XR ABD (KUB)    Narrative Exam: Supine frontal abdominal radiograph    Comparison: 8/1/2020. 7/18/2020. History: Abdominal pain. Vomiting. Number of views: 1    Findings: Moderate to large burden of pancolonic stool with fecal rectal filling  may indicate constipation if clinically correlated.  Bowel gas pattern otherwise  unremarkable. In particular, negative for bowel dilatation, bowel wall  thickening, pneumatosis intestinalis, portal venous gas, or free  subdiaphragmatic air. Negative for mass effect. Calcific atherosclerosis. Surgical clips projected over the expected distribution of the gallbladder  fossa. Impression Impression: Moderate to large burden of pancolonic stool with fecal rectal  distention may indicate constipation if clinically correlated. Results from [place] encounter on 09/21/20   CT ABD PELV WO CONT    Narrative PROCEDURE: CT ABD PELV WO CONT    HISTORY:Abdominal pain, fever    COMPARISON:Abdominal pelvis CT July 18, 2020      LIMITATIONS: None    TECHNIQUE: Axial images with multiplanar reconstruction. No IV contrast.    CHEST: Heart remains mildly enlarged. Again present are bilateral pleural  effusions with dependent changes in the lungs. LIVER: Normal  GALLBLADDER: Removed  BILIARY TREE: Normal  PANCREAS: Fat planes around the pancreas are less distinct. No discrete. Pancreatic fluid collection. There are traces of fluid in the retroperitoneum. SPLEEN: Normal  ADRENAL GLANDS: Normal  KIDNEYS/URETERS: There are calcifications in both renal dannielle areas. These appear  to be vascular. Both kidneys show minimal perinephric stranding similar to the  prior study. No evidence of hydronephrosis or ureteral stone. Bladder is  unremarkable. RETROPERITONEUM/AORTA: Atherosclerotic changes again noted knee aorta and its  branches. No aneurysm or periaortic pathology  BOWEL/MESENTERY: Stomach and small bowel show no active process. There is  extensive colonic diverticulosis in the sigmoid region with scattered  diverticula elsewhere. No definite diverticulitis. APPENDIX: Not clearly seen  PERITONEAL CAVITY: Small amount of free fluid in the posterior pelvis  REPRODUCTIVE: Normal  BONE/TISSUES: No acute abnormality.     OTHER: None      Impression IMPRESSION: Cardiomegaly with bilateral pleural effusions and dependent changes  in the lungs. Chronic or recurrent CHF? Indistinctness of the fat planes around the pancreas. There is chronic mild  perinephric stranding, a nonspecific finding. The changes around the pancreas  may be secondary to renal process. Correlate with laboratory data to exclude  low-grade pancreatitis. Diverticulosis without evidence of diverticulitis. Extensive atherosclerotic vascular disease. Small amount of free fluid in the pelvis. This is an abnormal finding in a male  but nonspecific. It could be secondary to any of the process is discussed above. IMPRESSION:   1. Acute hypoxic respiratory failure  2. Cardiac arrest CODE BLUE x4  3. Aspiration pneumonia  4. Cardiogenic status post code versus septic shock  5. End-stage renal disease on hemodialysis  6. Chronic Obstructive Pulmonary Disease with Severe Acute Exacerbation requiring inpatient hospitalization and management; has very poor airway clearance. Increased work of breathing  7. Body mass index is 22.88 kg/m². 8. Multiorgan dysfunction as outlined above: Pt has one or more acute or chronic illnesses with severe exacerbation with progression or side effects of treatment that poses a threat to life or bodily function  9. Additional workup outlined below  10. Pt is requiring Drug therapy requiring intensive monitoring for toxicity  11. Pt is unstable, unpredictable needing inpatient monitoring; is acutely ill and at high risk of sudden decline and decompensation with severe consequenses and continued end organ dysfunction and failure  12. Prognosis guarded       RECOMMENDATIONS/PLAN:     1. Patient is intubated on ventilator arterial blood gases shows pH 7.1 PCO2 20 PO2 161 and severe metabolic acidosis   2. Aspiration pneumonia  3. Ventilator settings of assist-control rate of 12 tidal volume 500 PEEP of 5 FiO2 30%  4.  Agree with Empiric IV antibiotics vancomycin and Maxipime pending culture results   5. Follow culture results abdominal CAT scan and PET scan was negative  6. Hemodynamically unstable patient is on Levophed and dopamine  7. Supplemental O2 to keep sats > 93%  8. Aspiration precautions  9. Labs to follow electrolytes, renal function and and blood counts  10. Glucose monitoring and SSI  11. Bronchial hygiene with respiratory therapy techniques, bronchodilators  12. DVT, SUP prophylaxis  13. History of chronic pain on chronic OxyContin  14. Noncompliant patient was refusing treatment before  15. Pt needs IV fluids with additives and Drug therapy requiring intensive monitoring for toxicity  16. Prescription drug management with home med reconciliation reviewed       This care involved high complexity medical decision making: I personally:  · Reviewed the flowsheet and previous days notes  · Reviewed and summarized records or history from previous days note or discussions with staff, family  · High Risk Drug therapy requiring intensive monitoring for toxicity: eg steroids, pressors, antibiotics  · Reviewed and/or ordered Clinical lab tests  · Reviewed images and/or ordered Radiology tests  · Reviewed the patients ECG / Telemetry  · Reviewed and/or adjusted NiPPV settings  · Called and arranged for Radiologic procedures or interventions  · performed or ordered Diagnostic endoscopies with identified risk factors. · discussed my assessment/management with : Nursing, Hospitalist and Family for coordination of care    I have spent critical care time involved in lab review, consultations with specialist, family decision-making, and documentation. During this entire length of time I was immediately available to the patient. The reason for providing this level of medical care for this critically ill patient was due to a critical illness that impaired one or more vital organ systems such that there was a high probability of imminent or life threatening deterioration in the patients condition.  This care involved high complexity decision making to assess, manipulate, and support vital system functions, to treat this degreee vital organ system failure and to prevent further life threatening deterioration of the patients condition.  54 min      Flash Morales MD

## 2020-09-27 NOTE — PROGRESS NOTES
4 eyes skin assessment completed with Alexandria Mcneil RN. Skin warm, dry, and intact. Healed scars noted on bilat arms. Mepilex applied to protect sacrum.

## 2020-09-27 NOTE — PROGRESS NOTES
Problem: Ventilator Management  Goal: *Adequate oxygenation and ventilation  Outcome: Progressing Towards Goal  Goal: *Patient maintains clear airway/free of aspiration  Outcome: Progressing Towards Goal  Goal: *Absence of infection signs and symptoms  Outcome: Progressing Towards Goal  Goal: *Normal spontaneous ventilation  Outcome: Progressing Towards Goal     Problem: Patient Education: Go to Patient Education Activity  Goal: Patient/Family Education  Outcome: Not Progressing Towards Goal   Unable to educate due to sedation/ventilation/critical state currently.

## 2020-09-27 NOTE — PROGRESS NOTES
Progress Note    Patient: Jaimson Guillory MRN: [id number]  SSN: [id number]    YOB: 1950  Age: [address] y.o. Sex: male      Admit Date: 9/25/2020    LOS: 2 days     Subjective:     69M, H/o ESRD on HD and HLD and HTN with cardiac arreast and acute hypoxic respiratory failure. It appears that patient before he coded was experiencing low blood pressure and lethargic before he became asystole. presneted here in shock and intubated s/t possible septic shock s/t aspiration pneumona. Review of Systems:  Constitutional:  denies weight loss, no fever, no chills, no night sweats  Eye: No recent visual disturbances, no discharge, no double vision  Ear/nose/mouth/throat : No hearing disturbance, no ear pain, no nasal congestion, no sore throat, no trouble swallowing. Respiratory : No trouble breathing, no cough, no shortness of breath, no hemoptysis, no wheezing  Cardiovascular : No chest pain, no palpitation, no racing of heart, no orthopnea, no paroxysmal nocturnal dyspnea, no peripheral edema  Gastrointestinal : No nausea, no vomiting, no diarrhea, constipation, heartburn, abdominal pain  Genitourinary : No dysuria, no hematuria, no increased frequency, incontinence,  Lymphatics : No swollen glands -Neck, axillary, inguinal  Endocrine : No excessive thirst, no polyuria no cold intolerance, no heat intolerance. Immunologic : No hives, urticaria, no seasonal allergies,   Musculoskeletal : Left upper back pain.   No joint swelling, pain, effusion,  no neck pain,   Integumentary : No rash, no pruritus, no ecchymosis  Hematology : No petechiae, No easy bruising,  No tendency to bleed easy  Neurology : Denies change in mental status, no abnormal balance, no headache, no confusion, numbness, tingling,  Psychiatric : No mood swings, no anxiety, depression      Objective:     Vitals:    09/27/20 1128 09/27/20 1200 09/27/20 1242 09/27/20 1317   BP: (!) 118/59  (!) 149/67    Pulse: (!) 101 (!) 106 (!) 116 Resp: 12      Temp: (!) 95.7 °F (35.4 °C)      SpO2: 100%   100%   Weight:       Height:            Intake and Output:  Current Shift: 09/27 0701 - 09/27 1900  In: -   Out: 497   Last three shifts: 09/25 1901 - 09/27 0700  In: 150   Out: 578       Physical Exam:   General : Appearance:  no respiratory distress noted  HEENT : PERRLA, normal oral mucosa, atraumatic normocephalic, Normal ear and nose. Neck : Supple, no JVD, no masses noted, no carotid bruit  Lungs : normal breath sounds. No wheezing, no rales. He is splinting on deep breathing. CVS : Rhythm rate regular, S1+, S2+, no murmur or gallop  Abdomen : Soft, nontender, no organomegaly, bowel sounds active  Extremities : No edema noted,  pedal pulses palpable  Musculoskeletal : Fair range of motion, no joint swelling or effusion, muscle tone and power appears normal,   Skin : Moist, warm, skin turgor, no pathological rash  Lymphatic : No cervical lymphadenopathy. Neurological : Awake, alert, oriented to time place person. No neurological deficits. Psychiatric : Mood and affect appears appropriate to the situation.        Lab/Data Review:  Recent Results (from the past 24 hour(s))   BLOOD GAS, ARTERIAL    Collection Time: 09/27/20  2:15 AM   Result Value Ref Range    pH 7.48 (H) 7.35 - 7.45      PCO2 29 (L) 35 - 45 mmHg    PO2 107 (H) 75 - 100 mmHg    O2 SAT 99 >95 %    BICARBONATE 23 22 - 26 mmol/L    BASE DEFICIT 1.4 0 - 2 mmol/L    O2 METHOD VENT      FIO2 30 %    MODE Assist Control/Volume Control      Tidal volume 500      SET RATE 12      EPAP/CPAP/PEEP 5      SITE Right Radial      ESE'S TEST Positive     RENAL FUNCTION PANEL    Collection Time: 09/27/20  4:30 AM   Result Value Ref Range    Sodium 133 (L) 136 - 145 mmol/L    Potassium 4.4 3.5 - 5.1 mmol/L    Chloride 100 97 - 108 mmol/L    CO2 21 21 - 32 mmol/L    Anion gap 12 5 - 15 mmol/L    Glucose 191 (H) 65 - 100 mg/dL    BUN 70 (H) 6 - 20 mg/dL    Creatinine 4.87 (H) 0.70 - 1.30 mg/dL BUN/Creatinine ratio 14 12 - 20      GFR est AA 14 (L) >60 ml/min/1.73m2    GFR est non-AA 12 (L) >60 ml/min/1.73m2    Calcium 9.1 8.5 - 10.1 mg/dL    Phosphorus 4.5 2.6 - 4.7 mg/dL    Albumin 2.0 (L) 3.5 - 5.0 g/dL   PHOSPHORUS    Collection Time: 09/27/20  4:30 AM   Result Value Ref Range    Phosphorus 4.6 2.6 - 4.7 mg/dL   MAGNESIUM    Collection Time: 09/27/20  4:30 AM   Result Value Ref Range    Magnesium 2.2 1.6 - 2.4 mg/dL   CBC WITH AUTOMATED DIFF    Collection Time: 09/27/20  4:30 AM   Result Value Ref Range    WBC 11.0 4.1 - 11.1 K/uL    RBC 3.17 (L) 4.10 - 5.70 M/uL    HGB 9.9 (L) 12.1 - 17.0 %    HCT 30.0 (L) 36.6 - 50.3 %    MCV 94.6 80.0 - 99.0 FL    MCH 31.2 26.0 - 34.0 PG    MCHC 33.0 30.0 - 36.5 g/dL    RDW 17.2 (H) 11.5 - 14.5 %    PLATELET 860 (L) 976 - 400 K/uL    NEUTROPHILS 92 (H) 32 - 75 %    LYMPHOCYTES 4 (L) 12 - 49 %    MONOCYTES 3 (L) 5 - 13 %    EOSINOPHILS 0 0 - 7 %    BASOPHILS 0 0 - 1 %    IMMATURE GRANULOCYTES 1 (H) 0.0 - 0.5 %    ABS. NEUTROPHILS 10.3 (H) 1.8 - 8.0 K/UL    ABS. LYMPHOCYTES 0.4 (L) 0.8 - 3.5 K/UL    ABS. MONOCYTES 0.3 0.0 - 1.0 K/UL    ABS. EOSINOPHILS 0.0 0.0 - 0.4 K/UL    ABS. BASOPHILS 0.0 0.0 - 0.1 K/UL    ABS. IMM. GRANS. 0.1 (H) 0.00 - 0.04 K/UL    DF AUTOMATED     LACTIC ACID    Collection Time: 09/27/20  4:30 AM   Result Value Ref Range    Lactic acid 2.0 0.4 - 2.0 mmol/L   GLUCOSE, POC    Collection Time: 09/27/20  8:08 AM   Result Value Ref Range    Glucose (POC) 146 (H) 65 - 100 mg/dL    Performed by DMITRI BIALON    GLUCOSE, POC    Collection Time: 09/27/20 11:01 AM   Result Value Ref Range    Glucose (POC) 172 (H) 65 - 100 mg/dL    Performed by DMITRI BAILON          Assessment and plan:      (1) Acute hypoxic respiratory failure : vent. AC peep 5, TV 12, Fio2 30%. (2) Cardiac arrest: likely aspiration event.      (3) Septic shock: on pressors, vancomycin and meropenem    (4) aspiration pneumonia :     (5) ESRD on HD: consult nephrology    (6) Acute encephalopathy : likely hypoxia. Treating above first then tuen off sedation to assess.     Poor prognosis        Signed By: Florian Sy MD     September 27, 2020

## 2020-09-27 NOTE — PROGRESS NOTES
Pulmonary ICU Progress Note    Subjective:   Daily Progress Note: 2020 10:09 AM    CC: intubated on ventilator    HPI:  69-year-old male transferred from [facility] as he was admitted there because of generalized weakness shortness of breath abdominal pain and fluid overload she was a renal failure getting hemodialysis. Patient subsequently coded cardiac arrest ACLS protocol was done he was found to be in PEA patient revived intubated on ventilator he has cardiac arrest at least 4 times now he is on vasopressors dopamine levo fed 7 also on vancomycin Maxipime so critical care consult was called for further evaluation    Review of Systems  Unable to obtain    Objective:     Visit Vitals  /73 (BP Patient Position: At rest)   Pulse (!) 111   Temp (!) 96.6 °F (35.9 °C) Comment: rigoberto hugger reapplied   Resp 24   Ht 5' 9.02\" (1.753 m)   Wt 75.3 kg (166 lb 0.1 oz)   SpO2 100%   BMI 24.50 kg/m²      O2 Device: Ventilator    Temp (24hrs), Av.5 °F (35.8 °C), Min:93.4 °F (34.1 °C), Max:99.1 °F (37.3 °C)       07 -  1900  In: -   Out: 124   1901 -  0700  In: 150   Out: 578     Visit Vitals  /73 (BP Patient Position: At rest)   Pulse (!) 111   Temp (!) 96.6 °F (35.9 °C) Comment: rigoberto hugger reapplied   Resp 24   Ht 5' 9.02\" (1.753 m)   Wt 75.3 kg (166 lb 0.1 oz)   SpO2 100%   BMI 24.50 kg/m²     General:   Sedated on ventilator   Head:  Normocephalic, without obvious abnormality, atraumatic. Eyes:  Conjunctivae/corneas clear. PERRL, EOMs intact. Fundi benign   Ears:  Normal TMs and external ear canals both ears. Nose: Nares normal. Septum midline. Mucosa normal. No drainage or sinus tenderness. Throat: Lips, mucosa, and tongue normal. Teeth and gums normal.   Neck: Supple, symmetrical, trachea midline, no adenopathy, thyroid: no enlargement/tenderness/nodules, no carotid bruit and no JVD. Back:   Symmetric, no curvature.  ROM normal. No CVA tenderness. Lungs:    Bilateral rails   Chest wall:  No tenderness or deformity. Heart:  Regular rate and rhythm, S1, S2 normal, no murmur, click, rub or gallop. Abdomen:   Soft, non-tender. Bowel sounds normal. No masses,  No organomegaly. Genitalia:  Normal male without lesion, discharge or tenderness. Rectal:  Normal tone, normal prostate, no masses or tenderness  Guaiac negative stool. Extremities: Extremities normal, atraumatic, no cyanosis or edema. Pulses: 2+ and symmetric all extremities.    Skin: Skin color, texture, turgor normal. No rashes or lesions   Lymph nodes: Cervical, supraclavicular, and axillary nodes normal.   Neurologic:  Sedated with propofol           Data Review    Recent Results (from the past 24 hour(s))   CULTURE, RESPIRATORY/SPUTUM/BRONCH W GRAM STAIN    Collection Time: 09/26/20 10:30 AM    Specimen: Sputum   Result Value Ref Range    Special Requests: No Special Requests      GRAM STAIN Occasional WBCs seen      GRAM STAIN Occasional Epithelial cells seen      GRAM STAIN 1+ Gram Positive Cocci in pairs      GRAM STAIN Few Gram Positive Cocci in clusters      GRAM STAIN Occasional Budding yeast      Culture result: PENDING    BLOOD GAS, ARTERIAL    Collection Time: 09/27/20  2:15 AM   Result Value Ref Range    pH 7.48 (H) 7.35 - 7.45      PCO2 29 (L) 35 - 45 mmHg    PO2 107 (H) 75 - 100 mmHg    O2 SAT 99 >95 %    BICARBONATE 23 22 - 26 mmol/L    BASE DEFICIT 1.4 0 - 2 mmol/L    O2 METHOD VENT      FIO2 30 %    MODE Assist Control/Volume Control      Tidal volume 500      SET RATE 12      EPAP/CPAP/PEEP 5      SITE Right Radial      ESE'S TEST Positive     RENAL FUNCTION PANEL    Collection Time: 09/27/20  4:30 AM   Result Value Ref Range    Sodium 133 (L) 136 - 145 mmol/L    Potassium 4.4 3.5 - 5.1 mmol/L    Chloride 100 97 - 108 mmol/L    CO2 21 21 - 32 mmol/L    Anion gap 12 5 - 15 mmol/L    Glucose 191 (H) 65 - 100 mg/dL    BUN 70 (H) 6 - 20 mg/dL    Creatinine 4.87 (H) 0.70 - 1.30 mg/dL    BUN/Creatinine ratio 14 12 - 20      GFR est AA 14 (L) >60 ml/min/1.73m2    GFR est non-AA 12 (L) >60 ml/min/1.73m2    Calcium 9.1 8.5 - 10.1 mg/dL    Phosphorus 4.5 2.6 - 4.7 mg/dL    Albumin 2.0 (L) 3.5 - 5.0 g/dL   PHOSPHORUS    Collection Time: 09/27/20  4:30 AM   Result Value Ref Range    Phosphorus 4.6 2.6 - 4.7 mg/dL   MAGNESIUM    Collection Time: 09/27/20  4:30 AM   Result Value Ref Range    Magnesium 2.2 1.6 - 2.4 mg/dL   CBC WITH AUTOMATED DIFF    Collection Time: 09/27/20  4:30 AM   Result Value Ref Range    WBC 11.0 4.1 - 11.1 K/uL    RBC 3.17 (L) 4.10 - 5.70 M/uL    HGB 9.9 (L) 12.1 - 17.0 %    HCT 30.0 (L) 36.6 - 50.3 %    MCV 94.6 80.0 - 99.0 FL    MCH 31.2 26.0 - 34.0 PG    MCHC 33.0 30.0 - 36.5 g/dL    RDW 17.2 (H) 11.5 - 14.5 %    PLATELET 361 (L) 441 - 400 K/uL    NEUTROPHILS 92 (H) 32 - 75 %    LYMPHOCYTES 4 (L) 12 - 49 %    MONOCYTES 3 (L) 5 - 13 %    EOSINOPHILS 0 0 - 7 %    BASOPHILS 0 0 - 1 %    IMMATURE GRANULOCYTES 1 (H) 0.0 - 0.5 %    ABS. NEUTROPHILS 10.3 (H) 1.8 - 8.0 K/UL    ABS. LYMPHOCYTES 0.4 (L) 0.8 - 3.5 K/UL    ABS. MONOCYTES 0.3 0.0 - 1.0 K/UL    ABS. EOSINOPHILS 0.0 0.0 - 0.4 K/UL    ABS. BASOPHILS 0.0 0.0 - 0.1 K/UL    ABS. IMM.  GRANS. 0.1 (H) 0.00 - 0.04 K/UL    DF AUTOMATED     LACTIC ACID    Collection Time: 09/27/20  4:30 AM   Result Value Ref Range    Lactic acid 2.0 0.4 - 2.0 mmol/L   GLUCOSE, POC    Collection Time: 09/27/20  8:08 AM   Result Value Ref Range    Glucose (POC) 146 (H) 65 - 100 mg/dL    Performed by DMITRI BAILON        Current Facility-Administered Medications   Medication Dose Route Frequency    sodium chloride (NS) flush 5-40 mL  5-40 mL IntraVENous Q8H    sodium chloride (NS) flush 5-40 mL  5-40 mL IntraVENous PRN    oxyCODONE IR (ROXICODONE) tablet 5 mg  5 mg Oral Q8H PRN    ferrous sulfate tablet 325 mg  1 Tab Oral BID WITH MEALS    pantoprazole (PROTONIX) tablet 40 mg  40 mg Oral ACB    atorvastatin (LIPITOR) tablet 40 mg  40 mg Oral QHS    carvediloL (COREG) tablet 25 mg  25 mg Oral BID WITH MEALS    mirtazapine (REMERON) tablet 15 mg  15 mg Oral QHS    brimonidine (ALPHAGAN) 0.2 % ophthalmic solution 1 Drop  1 Drop Both Eyes TID    dorzolamide (TRUSOPT) 2 % ophthalmic solution 1 Drop  1 Drop Both Eyes TID    docusate sodium (COLACE) capsule 100 mg  100 mg Oral BID    polyethylene glycol (MIRALAX) packet 17 g  17 g Oral DAILY    sevelamer carbonate (RENVELA) tab 2,400 mg  2,400 mg Oral TID WITH MEALS    VANCOMYCIN INFORMATION NOTE 1 Each  1 Each Other Rx Dosing/Monitoring    EPINEPHrine (ADRENALIN) 5 mg in 0.9% sodium chloride 250 mL infusion  0-10 mcg/min IntraVENous TITRATE    DOPamine (INTROPIN) 400 mg in dextrose 5% 250 mL infusion  0-50 mcg/kg/min IntraVENous TITRATE    sodium chloride (NS) flush 5-40 mL  5-40 mL IntraVENous Q8H    sodium chloride (NS) flush 5-40 mL  5-40 mL IntraVENous PRN    acetaminophen (TYLENOL) tablet 650 mg  650 mg Oral Q6H PRN    Or    acetaminophen (TYLENOL) suppository 650 mg  650 mg Rectal Q6H PRN    polyethylene glycol (MIRALAX) packet 17 g  17 g Oral DAILY PRN    promethazine (PHENERGAN) tablet 12.5 mg  12.5 mg Oral Q6H PRN    Or    ondansetron (ZOFRAN) injection 4 mg  4 mg IntraVENous Q6H PRN    potassium chloride 10 mEq in 100 ml IVPB  20 mEq IntraVENous PRN    magnesium sulfate 2 g/50 ml IVPB (premix or compounded)  2 g IntraVENous PRN    albuterol-ipratropium (DUO-NEB) 2.5 MG-0.5 MG/3 ML  3 mL Nebulization Q6H RT    NOREPINephrine (LEVOPHED) 8 mg in 5% dextrose 250mL (32 mcg/mL) infusion  0.5-16 mcg/min IntraVENous TITRATE    heparin porcine injection 5,000 Units  5,000 Units SubCUTAneous Q8H    lactated Ringers infusion  100 mL/hr IntraVENous CONTINUOUS    cefepime (MAXIPIME) 2 g in 0.9% sodium chloride (MBP/ADV) 100 mL MBP  2 g IntraVENous Q24H    bicarbonate dialysis (PRISMASOL) BG K 2/Ca 3.5 5000 ml solution   Extracorporeal DIALYSIS CONTINUOUS    propofol (DIPRIVAN) 10 mg/mL infusion  5 mcg/kg/min IntraVENous TITRATE         Assessment/Plan:     1. Acute hypoxic respiratory failure  2. Cardiac arrest CODE BLUE x4  3. Aspiration pneumonia  4. Cardiogenic status post code versus septic shock  5. End-stage renal disease on hemodialysis  6. Chronic Obstructive Pulmonary Disease with Severe Acute Exacerbation requiring inpatient hospitalization and management; has very poor airway clearance. Increased work of breathing  7. Body mass index is 22.88 kg/m². 8. Multiorgan dysfunction as outlined above: Pt has one or more acute or chronic illnesses with severe exacerbation with progression or side effects of treatment that poses a threat to life or bodily function  9. Additional workup outlined below  10. Pt is requiring Drug therapy requiring intensive monitoring for toxicity  11. Pt is unstable, unpredictable needing inpatient monitoring; is acutely ill and at high risk of sudden decline and decompensation with severe consequenses and continued end organ dysfunction and failure  12. Prognosis guarded        RECOMMENDATIONS/PLAN:      1. Patient is intubated on ventilator arterial blood gases shows pH 7.48 PCO2 29 PO2 107 corrected we will continue the current vent settings will try to wean tomorrow morning  2. Aspiration pneumonia  3. Ventilator settings of assist-control rate of 12 tidal volume 500 PEEP of 5 FiO2 30%  4. Agree with Empiric IV antibiotics vancomycin and Maxipime pending culture results   5. Follow culture results abdominal CAT scan and PET scan was negative  6. Hemodynamically unstable patient is on Levophed and dopamine       Total time spent with patient: 35 minutes.

## 2020-09-27 NOTE — PROGRESS NOTES
Consult Date: 2020    Consults nephrology-for ESRD dependent on hemodialysis    Subjective  /History of presenting illness:  Patient was seen and examined. Currently on CRRT. Started 1500 yesterday. On further clotted already. Currently on 2 pressors- dopamine at 20 mics and Levophed. Heart rate 100-1 20s-irregular history of chronic A. fib. Hypothermic this morning and currently on a [employer].   ICU events reviewed with RN    Past Medical History:   Diagnosis Date    Anemia     Chronic kidney disease     Chronic pain     back and neck    Hypercholesterolemia     Hypertension    ESRD on hemodialysis,  and Friday schedule  Past Surgical History:   Procedure Laterality Date    HX ARTERIOVENOUS FISTULA  2020    HX CHOLECYSTECTOMY      HX UROLOGICAL      bladder surgery     Family History   Problem Relation Age of Onset    Diabetes Mother       Social History     Tobacco Use    Smoking status: Former Smoker     Packs/day: 0.50     Years: 10.00     Pack years: 5.00     Last attempt to quit:      Years since quittin.7    Smokeless tobacco: Never Used   Substance Use Topics    Alcohol use: Not Currently       Current Facility-Administered Medications   Medication Dose Route Frequency Provider Last Rate Last Dose    sodium chloride (NS) flush 5-40 mL  5-40 mL IntraVENous Q8H Gregg Spring MD   10 mL at 20 0648    sodium chloride (NS) flush 5-40 mL  5-40 mL IntraVENous PRN Gregg Spring MD        oxyCODONE IR (ROXICODONE) tablet 5 mg  5 mg Oral Q8H PRN Gregg Spring MD        ferrous sulfate tablet 325 mg  1 Tab Oral BID WITH MEALS Gregg Spring MD   Stopped at 20 0800    pantoprazole (PROTONIX) tablet 40 mg  40 mg Oral ACB Gregg Spring MD   40 mg at 20 0647    atorvastatin (LIPITOR) tablet 40 mg  40 mg Oral QHS Gregg Spring MD   40 mg at 20 2137    carvediloL (COREG) tablet 25 mg  25 mg Oral BID WITH MEALS Rosalie Charles Bassem Nichols MD   Stopped at 09/26/20 0800    mirtazapine (REMERON) tablet 15 mg  15 mg Oral QHS Claribel Christensen MD   15 mg at 09/26/20 2137    brimonidine (ALPHAGAN) 0.2 % ophthalmic solution 1 Drop  1 Drop Both Eyes TID Claribel Christensen MD   1 Drop at 09/27/20 0820    dorzolamide (TRUSOPT) 2 % ophthalmic solution 1 Drop  1 Drop Both Eyes TID Claribel Christensen MD   1 Drop at 09/27/20 0820    docusate sodium (COLACE) capsule 100 mg  100 mg Oral BID Claribel Christensen MD   Stopped at 09/27/20 0900    polyethylene glycol (MIRALAX) packet 17 g  17 g Oral DAILY Claribel Christensen MD   Stopped at 09/26/20 0900    sevelamer carbonate (RENVELA) tab 2,400 mg  2,400 mg Oral TID WITH MEALS Claribel Christensen MD   Stopped at 09/26/20 0800    VANCOMYCIN INFORMATION NOTE 1 Each  1 Each Other Rx Dosing/Monitoring Claribel Christensen MD        EPINEPHrine (ADRENALIN) 5 mg in 0.9% sodium chloride 250 mL infusion  0-10 mcg/min IntraVENous TITRATE Claribel Christensen MD 6 mL/hr at 09/26/20 0109 2 mcg/min at 09/26/20 0109    DOPamine (INTROPIN) 400 mg in dextrose 5% 250 mL infusion  0-50 mcg/kg/min IntraVENous TITRATE Claribel Christensen MD 51.5 mL/hr at 09/27/20 0818 20 mcg/kg/min at 09/27/20 0818    sodium chloride (NS) flush 5-40 mL  5-40 mL IntraVENous Q8H Claribel Christensen MD   10 mL at 09/27/20 0648    sodium chloride (NS) flush 5-40 mL  5-40 mL IntraVENous PRN Claribel Christensen MD        acetaminophen (TYLENOL) tablet 650 mg  650 mg Oral Q6H PRN Claribel Christensen MD        Or   Dilma Bud acetaminophen (TYLENOL) suppository 650 mg  650 mg Rectal Q6H PRN Claribel Christensen MD        polyethylene glycol (MIRALAX) packet 17 g  17 g Oral DAILY PRN Claribel Christensen MD        promethazine (PHENERGAN) tablet 12.5 mg  12.5 mg Oral Q6H PRN Claribel Christensen MD        Or    ondansetron [facility]) injection 4 mg  4 mg IntraVENous Q6H PRN Claribel Christensen MD        potassium chloride 10 mEq in 100 ml IVPB  20 mEq IntraVENous PRN MD Dilma Johnston magnesium sulfate 2 g/50 ml IVPB (premix or compounded)  2 g IntraVENous PRN Lillian López MD        albuterol-ipratropium (DUO-NEB) 2.5 MG-0.5 MG/3 ML  3 mL Nebulization Q6H RT Lillian López MD   3 mL at 09/27/20 0737    NOREPINephrine (LEVOPHED) 8 mg in 5% dextrose 250mL (32 mcg/mL) infusion  0.5-16 mcg/min IntraVENous TITRATE Lillian López MD 3.8 mL/hr at 09/26/20 1329 2 mcg/min at 09/26/20 1329    heparin porcine injection 5,000 Units  5,000 Units SubCUTAneous Q8H Lillian López MD   5,000 Units at 09/27/20 1191    lactated Ringers infusion  100 mL/hr IntraVENous CONTINUOUS Lillian López  mL/hr at 09/26/20 0913 100 mL/hr at 09/26/20 0913    cefepime (MAXIPIME) 2 g in 0.9% sodium chloride (MBP/ADV) 100 mL MBP  2 g IntraVENous Q24H Lillian López  mL/hr at 09/27/20 0831 2 g at 09/27/20 0831    bicarbonate dialysis (PRISMASOL) BG K 2/Ca 3.5 5000 ml solution   Extracorporeal DIALYSIS CONTINUOUS Marlee Don MD 1,000 mL/hr at 09/27/20 0309 1,000 mL/hr at 09/27/20 0309    propofol (DIPRIVAN) 10 mg/mL infusion  5 mcg/kg/min IntraVENous TITRATE Lillian López MD 14.4 mL/hr at 09/27/20 1053 35 mcg/kg/min at 09/27/20 1053        Review of Systems    Objective     Vital signs for last 24 hours:  Visit Vitals  BP (!) 155/97 (BP Patient Position: At rest)   Pulse (!) 107   Temp (!) 96.6 °F (35.9 °C) Comment: rigoberto taylor reapplied   Resp 12   Ht 5' 9.02\" (1.753 m)   Wt 75.3 kg (166 lb 0.1 oz)   SpO2 100%   BMI 24.50 kg/m²       Intake/Output this shift:  Current Shift: 09/27 0701 - 09/27 1900  In: -   Out: 333   Last 3 Shifts: 09/25 1901 - 09/27 0700  In: 150   Out: 578   Physical Exam     Data Review:   Recent Results (from the past 24 hour(s))   BLOOD GAS, ARTERIAL    Collection Time: 09/27/20  2:15 AM   Result Value Ref Range    pH 7.48 (H) 7.35 - 7.45      PCO2 29 (L) 35 - 45 mmHg    PO2 107 (H) 75 - 100 mmHg    O2 SAT 99 >95 %    BICARBONATE 23 22 - 26 mmol/L    BASE DEFICIT 1.4 0 - 2 mmol/L    O2 METHOD VENT      FIO2 30 %    MODE Assist Control/Volume Control      Tidal volume 500      SET RATE 12      EPAP/CPAP/PEEP 5      SITE Right Radial      ESE'S TEST Positive     RENAL FUNCTION PANEL    Collection Time: 09/27/20  4:30 AM   Result Value Ref Range    Sodium 133 (L) 136 - 145 mmol/L    Potassium 4.4 3.5 - 5.1 mmol/L    Chloride 100 97 - 108 mmol/L    CO2 21 21 - 32 mmol/L    Anion gap 12 5 - 15 mmol/L    Glucose 191 (H) 65 - 100 mg/dL    BUN 70 (H) 6 - 20 mg/dL    Creatinine 4.87 (H) 0.70 - 1.30 mg/dL    BUN/Creatinine ratio 14 12 - 20      GFR est AA 14 (L) >60 ml/min/1.73m2    GFR est non-AA 12 (L) >60 ml/min/1.73m2    Calcium 9.1 8.5 - 10.1 mg/dL    Phosphorus 4.5 2.6 - 4.7 mg/dL    Albumin 2.0 (L) 3.5 - 5.0 g/dL   PHOSPHORUS    Collection Time: 09/27/20  4:30 AM   Result Value Ref Range    Phosphorus 4.6 2.6 - 4.7 mg/dL   MAGNESIUM    Collection Time: 09/27/20  4:30 AM   Result Value Ref Range    Magnesium 2.2 1.6 - 2.4 mg/dL   CBC WITH AUTOMATED DIFF    Collection Time: 09/27/20  4:30 AM   Result Value Ref Range    WBC 11.0 4.1 - 11.1 K/uL    RBC 3.17 (L) 4.10 - 5.70 M/uL    HGB 9.9 (L) 12.1 - 17.0 %    HCT 30.0 (L) 36.6 - 50.3 %    MCV 94.6 80.0 - 99.0 FL    MCH 31.2 26.0 - 34.0 PG    MCHC 33.0 30.0 - 36.5 g/dL    RDW 17.2 (H) 11.5 - 14.5 %    PLATELET 545 (L) 373 - 400 K/uL    NEUTROPHILS 92 (H) 32 - 75 %    LYMPHOCYTES 4 (L) 12 - 49 %    MONOCYTES 3 (L) 5 - 13 %    EOSINOPHILS 0 0 - 7 %    BASOPHILS 0 0 - 1 %    IMMATURE GRANULOCYTES 1 (H) 0.0 - 0.5 %    ABS. NEUTROPHILS 10.3 (H) 1.8 - 8.0 K/UL    ABS. LYMPHOCYTES 0.4 (L) 0.8 - 3.5 K/UL    ABS. MONOCYTES 0.3 0.0 - 1.0 K/UL    ABS. EOSINOPHILS 0.0 0.0 - 0.4 K/UL    ABS. BASOPHILS 0.0 0.0 - 0.1 K/UL    ABS. IMM.  GRANS. 0.1 (H) 0.00 - 0.04 K/UL    DF AUTOMATED     LACTIC ACID    Collection Time: 09/27/20  4:30 AM   Result Value Ref Range    Lactic acid 2.0 0.4 - 2.0 mmol/L   GLUCOSE, POC    Collection Time: 09/27/20  8:08 AM Result Value Ref Range    Glucose (POC) 146 (H) 65 - 100 mg/dL    Performed by DMITRI BAILON    GLUCOSE, POC    Collection Time: 09/27/20 11:01 AM   Result Value Ref Range    Glucose (POC) 172 (H) 65 - 100 mg/dL    Performed by DMITRI BAILON          General: Unresponsive, eyes closed  Skin: No rash, no erythema  HEENT: Sclerae anicteric. NG tube, ET tube  Cardiovascular: Narrow complex QRS  Respiratory: Vent dependent with FiO2 40%  GI: Abdomen nondistended, soft, and nontender  Neurological: Unresponsive  Access: Right internal jugular tunneled venous catheter, some crusting noted at exit site. Dressing is intact. Left femoral triple-lumen catheter placed  Currently on a rigoberto hugger blanket  He is on dopamine and Levophed at this time. Blood flow for CVVHD F is 200 mL/min  Dialysate flow and replacement flow is positive as per minute. We are not removing any fluid while on dialysis. XR CHEST PORT   Final Result   Impression:    1. Distal tracheal intubation as described. (Please note that anterior-posterior   radiography is a one dimensional exam and cannot accurately determine the exact   position of support apparatus. If there is need to better localize structures in   space, a lateral radiograph may prove helpful, as clinically indicated). 2. No developing pulmonary parenchymal or pleural lesion.            Patient Active Problem List   Diagnosis Code    Kidney disease, chronic, end stage on dialysis ([facility] [address] N18.6, Z99.2    Hypertension I10    Hypercholesterolemia E78.00    AV fistula occlusion, sequela T82.898S    Dialysis AV fistula malfunction (HCC) T82.590A    ESRD (end stage renal disease) on dialysis (HCC) N18.6, Z99.2    Fluid overload E87.70    Abdominal pain R10.9    Anemia D64.9    Chronic pain G89.29    Gastritis K29.70    Constipation K59.00    Cardiac arrest (HCC) I46.9   · end-stage renal disease dependent on hemodialysis  · Shock with multiorgan failure multiple pressor dependent  · Encephalopathy  · Ventilator dependent respiratory failure  · Status post cardiac arrest x4 9/25/20  · Nonadherence to treatment  · Septic shock  · Hyperkalemia, resolved  · Severe metabolic acidosis, improved  Plan:  CRRT to be continued. Started 5 PM yesterday. 1 filter has clotted already. Continue with same prescription with blood flow 200 mL/min and dialysis and placement flow at 1000mL per hr.  He is still pressor dependent and we can remove any fluid. I  anticipate that we stop CRRT tomorrow in the evening. Having received 48 hours of dialysis. Continue monitor CBC and transfuse if hemoglobin drops continue treatment for sepsis. Antibiotics to be dosed as per pharmacy anticipating enhance clearance because of severe CF. Prognosis is still poor.   We will continue to follow

## 2020-09-28 NOTE — PROGRESS NOTES
Renal Daily Progress Note    Admit Date: 9/25/2020  Hospital day 3    Subjective: On CRRT - CVVHD. 50 ml net neg an hour. He is intubated and is on dopamine and norepinephrine. He apparently became bradycardic off dopamine. He is on a Jim hugger.     Current Facility-Administered Medications   Medication Dose Route Frequency    sodium chloride (NS) flush 5-40 mL  5-40 mL IntraVENous Q8H    sodium chloride (NS) flush 5-40 mL  5-40 mL IntraVENous PRN    oxyCODONE IR (ROXICODONE) tablet 5 mg  5 mg Oral Q8H PRN    ferrous sulfate tablet 325 mg  1 Tab Oral BID WITH MEALS    pantoprazole (PROTONIX) tablet 40 mg  40 mg Oral ACB    atorvastatin (LIPITOR) tablet 40 mg  40 mg Oral QHS    mirtazapine (REMERON) tablet 15 mg  15 mg Oral QHS    brimonidine (ALPHAGAN) 0.2 % ophthalmic solution 1 Drop  1 Drop Both Eyes TID    dorzolamide (TRUSOPT) 2 % ophthalmic solution 1 Drop  1 Drop Both Eyes TID    docusate sodium (COLACE) capsule 100 mg  100 mg Oral BID    polyethylene glycol (MIRALAX) packet 17 g  17 g Oral DAILY    sevelamer carbonate (RENVELA) tab 2,400 mg  2,400 mg Oral TID WITH MEALS    VANCOMYCIN INFORMATION NOTE 1 Each  1 Each Other Rx Dosing/Monitoring    EPINEPHrine (ADRENALIN) 5 mg in 0.9% sodium chloride 250 mL infusion  0-10 mcg/min IntraVENous TITRATE    DOPamine (INTROPIN) 400 mg in dextrose 5% 250 mL infusion  0-50 mcg/kg/min IntraVENous TITRATE    sodium chloride (NS) flush 5-40 mL  5-40 mL IntraVENous Q8H    sodium chloride (NS) flush 5-40 mL  5-40 mL IntraVENous PRN    acetaminophen (TYLENOL) tablet 650 mg  650 mg Oral Q6H PRN    Or    acetaminophen (TYLENOL) suppository 650 mg  650 mg Rectal Q6H PRN    polyethylene glycol (MIRALAX) packet 17 g  17 g Oral DAILY PRN    promethazine (PHENERGAN) tablet 12.5 mg  12.5 mg Oral Q6H PRN    Or    ondansetron (ZOFRAN) injection 4 mg  4 mg IntraVENous Q6H PRN    potassium chloride 10 mEq in 100 ml IVPB  20 mEq IntraVENous PRN    magnesium sulfate 2 g/50 ml IVPB (premix or compounded)  2 g IntraVENous PRN    albuterol-ipratropium (DUO-NEB) 2.5 MG-0.5 MG/3 ML  3 mL Nebulization Q6H RT    NOREPINephrine (LEVOPHED) 8 mg in 5% dextrose 250mL (32 mcg/mL) infusion  0.5-16 mcg/min IntraVENous TITRATE    heparin porcine injection 5,000 Units  5,000 Units SubCUTAneous Q8H    lactated Ringers infusion  100 mL/hr IntraVENous CONTINUOUS    cefepime (MAXIPIME) 2 g in 0.9% sodium chloride (MBP/ADV) 100 mL MBP  2 g IntraVENous Q24H    bicarbonate dialysis (PRISMASOL) BG K 2/Ca 3.5 5000 ml solution   Extracorporeal DIALYSIS CONTINUOUS    propofol (DIPRIVAN) 10 mg/mL infusion  5 mcg/kg/min IntraVENous TITRATE        Review of Systems    ROS   Not obtainable as the patient is intubated and on propofol    Objective:     Patient Vitals for the past 8 hrs:   BP Temp Pulse Resp SpO2   09/28/20 1400 (!) 112/51 (!) 95.5 °F (35.3 °C) 73 14 100 %   09/28/20 1341 -- -- -- -- 100 %   09/28/20 1300 (!) 83/50 (!) 95.4 °F (35.2 °C) 71 12 100 %   09/28/20 1200 110/60 (!) 95.4 °F (35.2 °C) 88 12 100 %   09/28/20 1140 -- -- 89 21 100 %   09/28/20 1100 (!) 106/57 (!) 96.1 °F (35.6 °C) 91 25 100 %   09/28/20 1000 126/64 -- (!) 101 13 100 %   09/28/20 0900 (!) 103/54 -- (!) 103 15 100 %   09/28/20 0817 -- -- (!) 113 15 100 %   09/28/20 0815 -- -- -- -- 100 %   09/28/20 0800 (!) 100/58 97 °F (36.1 °C) (!) 105 14 100 %   09/28/20 0700 136/64 96.8 °F (36 °C) -- 14 100 %     09/28 0701 - 09/28 1900  In: -   Out: 360   09/26 1901 - 09/28 0700  In: 1104 [I.V.:864]  Out: 2677 [Urine:20]    Physical Exam:   Physical Exam   Neck: No JVD present. Cardiovascular:   Irregular   Pulmonary/Chest: Breath sounds normal.   Abdominal: Soft. Musculoskeletal:         General: No edema. Neurological:   Intubated and sedated   Skin: Skin is warm. Left forearm AV fistula is thrombosed  Length of Stay 3       XR CHEST PORT   Final Result   Impression:    1.  Distal tracheal intubation as described. (Please note that anterior-posterior   radiography is a one dimensional exam and cannot accurately determine the exact   position of support apparatus. If there is need to better localize structures in   space, a lateral radiograph may prove helpful, as clinically indicated). 2. No developing pulmonary parenchymal or pleural lesion. XR CHEST PORT    (Results Pending)        Data Review   Recent Labs     09/28/20  0530 09/27/20  0430 09/26/20  0426   WBC 13.7* 11.0 16.2*   HGB 9.3* 9.9* 9.2*   HCT 28.5* 30.0* 27.9*   PLT 87* 115* 151     Recent Labs     09/28/20  0530 09/27/20  0430 09/26/20  0426 09/25/20  1800   * 133* 137 139   K 4.1 4.4 5.5* 5.9*    100 100 100   CO2 24 21 23 12*   * 191* 238* 214*   BUN 37* 70* 108* 106*   CREA 2.73* 4.87* 7.86* 8.77*   CA 8.5 9.1 9.7 11.1*   MG 1.9 2.2 2.6*  --    PHOS 2.8 4.5  4.6  --   --    ALB 1.9* 2.0* 2.1* 2.1*   ALT  --   --  160* 144*   INR  --   --  1.6* 1.6*     No components found for: [place]  Recent Labs     09/28/20  1045 09/27/20  0215 09/25/20  2334   PH 7.54* 7.48* 7.43   PCO2 26* 29* 28*   PO2 168* 107* 161*   HCO3 25 23 20*   FIO2 30 30 35     Recent Labs     09/26/20  0426 09/25/20  1800   INR 1.6* 1.6*         Assessment:           Active Problems:    Cardiac arrest ([facility] [address] (9/25/2020)    ESRD hemodialysis dependent  Status post cardiac arrest  Ventilator dependent respiratory failure  Septic shock  Metabolic acidosis resolved  Respiratory acidosis  Thrombocytopenia    Plan:   Continue CVVH DF. Decrease replacement fluid and dialysate fluid volume to  800 mL/h. 50 mL/h UF as tolerated. Good clearance on dialysis. Follow electrolytes  Wean off pressors as tolerated.

## 2020-09-28 NOTE — PROGRESS NOTES
Pulmonary ICU Progress Note    Subjective:   Daily Progress Note: 2020 10:09 AM    CC: intubated on ventilator    HPI:  76-year-old male transferred from [facility] as he was admitted there because of generalized weakness shortness of breath abdominal pain and fluid overload she was a renal failure getting hemodialysis. Patient subsequently coded cardiac arrest ACLS protocol was done he was found to be in PEA patient revived intubated on ventilator he has cardiac arrest at least 4 times now he is on vasopressors dopamine levo fed 7 also on vancomycin Maxipime so critical care consult was called for further evaluation   intubated on ventilator hemodynamically unstable on dopamine and levo fed and also getting (continuous renal replacement therapy) CRRT    Review of Systems  Unable to obtain    Objective:     Visit Vitals  BP (!) 100/58 (BP 1 Location: Right arm, BP Patient Position: At rest)   Pulse (!) 113   Temp 97 °F (36.1 °C)   Resp 15   Ht 5' 9.02\" (1.753 m)   Wt 75.3 kg (166 lb 0.1 oz)   SpO2 100%   BMI 24.50 kg/m²      O2 Device: Ventilator    Temp (24hrs), Av.1 °F (36.2 °C), Min:95.7 °F (35.4 °C), Max:99 °F (37.2 °C)      701 -  1900  In: -   Out: -1   1901 -  0700  In: 1104 [I.V.:864]  Out: 2677 [Urine:20]    Visit Vitals  BP (!) 100/58 (BP 1 Location: Right arm, BP Patient Position: At rest)   Pulse (!) 113   Temp 97 °F (36.1 °C)   Resp 15   Ht 5' 9.02\" (1.753 m)   Wt 75.3 kg (166 lb 0.1 oz)   SpO2 100%   BMI 24.50 kg/m²     General:   Sedated on ventilator   Head:  Normocephalic, without obvious abnormality, atraumatic. Eyes:  Conjunctivae/corneas clear. PERRL, EOMs intact. Fundi benign   Ears:  Normal TMs and external ear canals both ears. Nose: Nares normal. Septum midline. Mucosa normal. No drainage or sinus tenderness.    Throat: Lips, mucosa, and tongue normal. Teeth and gums normal.   Neck: Supple, symmetrical, trachea midline, no adenopathy, thyroid: no enlargement/tenderness/nodules, no carotid bruit and no JVD. Back:   Symmetric, no curvature. ROM normal. No CVA tenderness. Lungs:    Bilateral rails   Chest wall:  No tenderness or deformity. Heart:  Regular rate and rhythm, S1, S2 normal, no murmur, click, rub or gallop. Abdomen:   Soft, non-tender. Bowel sounds normal. No masses,  No organomegaly. Genitalia:  Normal male without lesion, discharge or tenderness. Rectal:  Normal tone, normal prostate, no masses or tenderness  Guaiac negative stool. Extremities: Extremities normal, atraumatic, no cyanosis or edema. Pulses: 2+ and symmetric all extremities.    Skin: Skin color, texture, turgor normal. No rashes or lesions   Lymph nodes: Cervical, supraclavicular, and axillary nodes normal.   Neurologic:  Sedated with propofol           Data Review    Recent Results (from the past 24 hour(s))   GLUCOSE, POC    Collection Time: 09/27/20 11:01 AM   Result Value Ref Range    Glucose (POC) 172 (H) 65 - 100 mg/dL    Performed by DMITRI BAILON    GLUCOSE, POC    Collection Time: 09/27/20  3:31 PM   Result Value Ref Range    Glucose (POC) 170 (H) 65 - 100 mg/dL    Performed by DMITRI BAILON    RENAL FUNCTION PANEL    Collection Time: 09/28/20  5:30 AM   Result Value Ref Range    Sodium 133 (L) 136 - 145 mmol/L    Potassium 4.1 3.5 - 5.1 mmol/L    Chloride 102 97 - 108 mmol/L    CO2 24 21 - 32 mmol/L    Anion gap 7 5 - 15 mmol/L    Glucose 191 (H) 65 - 100 mg/dL    BUN 37 (H) 6 - 20 mg/dL    Creatinine 2.73 (H) 0.70 - 1.30 mg/dL    BUN/Creatinine ratio 14 12 - 20      GFR est AA 28 (L) >60 ml/min/1.73m2    GFR est non-AA 23 (L) >60 ml/min/1.73m2    Calcium 8.5 8.5 - 10.1 mg/dL    Phosphorus 2.8 2.6 - 4.7 mg/dL    Albumin 1.9 (L) 3.5 - 5.0 g/dL   MAGNESIUM    Collection Time: 09/28/20  5:30 AM   Result Value Ref Range    Magnesium 1.9 1.6 - 2.4 mg/dL   CBC WITH AUTOMATED DIFF    Collection Time: 09/28/20  5:30 AM   Result Value Ref Range    WBC 13.7 (H) 4.1 - 11.1 K/uL    RBC 2.96 (L) 4.10 - 5.70 M/uL    HGB 9.3 (L) 12.1 - 17.0 %    HCT 28.5 (L) 36.6 - 50.3 %    MCV 96.3 80.0 - 99.0 FL    MCH 31.4 26.0 - 34.0 PG    MCHC 32.6 30.0 - 36.5 g/dL    RDW 17.4 (H) 11.5 - 14.5 %    PLATELET 87 (L) 627 - 400 K/uL    NEUTROPHILS 83 (H) 32 - 75 %    LYMPHOCYTES 8 (L) 12 - 49 %    MONOCYTES 9 5 - 13 %    EOSINOPHILS 0 0 - 7 %    BASOPHILS 0 0 - 1 %    IMMATURE GRANULOCYTES 0 0.0 - 0.5 %    ABS. NEUTROPHILS 11.4 (H) 1.8 - 8.0 K/UL    ABS. LYMPHOCYTES 1.1 0.8 - 3.5 K/UL    ABS. MONOCYTES 1.2 (H) 0.0 - 1.0 K/UL    ABS. EOSINOPHILS 0.0 0.0 - 0.4 K/UL    ABS. BASOPHILS 0.0 0.0 - 0.1 K/UL    ABS. IMM.  GRANS. 0.1 (H) 0.00 - 0.04 K/UL    DF AUTOMATED     VANCOMYCIN, RANDOM    Collection Time: 09/28/20  5:30 AM   Result Value Ref Range    Vancomycin, random 10.4 ug/mL    Reported dose date Not provided      Reported dose: Not provided Units   GLUCOSE, POC    Collection Time: 09/28/20  8:10 AM   Result Value Ref Range    Glucose (POC) 173 (H) 65 - 100 mg/dL    Performed by Morena Harrington        Current Facility-Administered Medications   Medication Dose Route Frequency    sodium chloride (NS) flush 5-40 mL  5-40 mL IntraVENous Q8H    sodium chloride (NS) flush 5-40 mL  5-40 mL IntraVENous PRN    oxyCODONE IR (ROXICODONE) tablet 5 mg  5 mg Oral Q8H PRN    ferrous sulfate tablet 325 mg  1 Tab Oral BID WITH MEALS    pantoprazole (PROTONIX) tablet 40 mg  40 mg Oral ACB    atorvastatin (LIPITOR) tablet 40 mg  40 mg Oral QHS    carvediloL (COREG) tablet 25 mg  25 mg Oral BID WITH MEALS    mirtazapine (REMERON) tablet 15 mg  15 mg Oral QHS    brimonidine (ALPHAGAN) 0.2 % ophthalmic solution 1 Drop  1 Drop Both Eyes TID    dorzolamide (TRUSOPT) 2 % ophthalmic solution 1 Drop  1 Drop Both Eyes TID    docusate sodium (COLACE) capsule 100 mg  100 mg Oral BID    polyethylene glycol (MIRALAX) packet 17 g  17 g Oral DAILY    sevelamer carbonate (RENVELA) tab 2,400 mg  2,400 mg Oral TID WITH MEALS    VANCOMYCIN INFORMATION NOTE 1 Each  1 Each Other Rx Dosing/Monitoring    EPINEPHrine (ADRENALIN) 5 mg in 0.9% sodium chloride 250 mL infusion  0-10 mcg/min IntraVENous TITRATE    DOPamine (INTROPIN) 400 mg in dextrose 5% 250 mL infusion  0-50 mcg/kg/min IntraVENous TITRATE    sodium chloride (NS) flush 5-40 mL  5-40 mL IntraVENous Q8H    sodium chloride (NS) flush 5-40 mL  5-40 mL IntraVENous PRN    acetaminophen (TYLENOL) tablet 650 mg  650 mg Oral Q6H PRN    Or    acetaminophen (TYLENOL) suppository 650 mg  650 mg Rectal Q6H PRN    polyethylene glycol (MIRALAX) packet 17 g  17 g Oral DAILY PRN    promethazine (PHENERGAN) tablet 12.5 mg  12.5 mg Oral Q6H PRN    Or    ondansetron (ZOFRAN) injection 4 mg  4 mg IntraVENous Q6H PRN    potassium chloride 10 mEq in 100 ml IVPB  20 mEq IntraVENous PRN    magnesium sulfate 2 g/50 ml IVPB (premix or compounded)  2 g IntraVENous PRN    albuterol-ipratropium (DUO-NEB) 2.5 MG-0.5 MG/3 ML  3 mL Nebulization Q6H RT    NOREPINephrine (LEVOPHED) 8 mg in 5% dextrose 250mL (32 mcg/mL) infusion  0.5-16 mcg/min IntraVENous TITRATE    heparin porcine injection 5,000 Units  5,000 Units SubCUTAneous Q8H    lactated Ringers infusion  100 mL/hr IntraVENous CONTINUOUS    cefepime (MAXIPIME) 2 g in 0.9% sodium chloride (MBP/ADV) 100 mL MBP  2 g IntraVENous Q24H    bicarbonate dialysis (PRISMASOL) BG K 2/Ca 3.5 5000 ml solution   Extracorporeal DIALYSIS CONTINUOUS    propofol (DIPRIVAN) 10 mg/mL infusion  5 mcg/kg/min IntraVENous TITRATE         Assessment/Plan:     1. Acute hypoxic respiratory failure  2. Cardiac arrest CODE BLUE x4  3. Aspiration pneumonia  4. Cardiogenic status post code versus septic shock  5. End-stage renal disease on hemodialysis  6. Chronic Obstructive Pulmonary Disease with Severe Acute Exacerbation requiring inpatient hospitalization and management; has very poor airway clearance.  Increased work of breathing  7. Body mass index is 22.88 kg/m². 8. Multiorgan dysfunction as outlined above: Pt has one or more acute or chronic illnesses with severe exacerbation with progression or side effects of treatment that poses a threat to life or bodily function  9. Additional workup outlined below  10. Pt is requiring Drug therapy requiring intensive monitoring for toxicity  11. Pt is unstable, unpredictable needing inpatient monitoring; is acutely ill and at high risk of sudden decline and decompensation with severe consequenses and continued end organ dysfunction and failure  12. Prognosis guarded        RECOMMENDATIONS/PLAN:      1. Patient is intubated on ventilator arterial blood gases shows pH 7.48 PCO2 29 PO2 107 corrected we will continue the current vent settings todays blood gases pending  2. Patient on Levophed and dopamine and also getting CRRT  3. Aspiration pneumonia  4. Ventilator settings of assist-control rate of 12 tidal volume 500 PEEP of 5 FiO2 30%  5. Agree with Empiric IV antibiotics vancomycin and Maxipime pending culture results   6. Follow culture results abdominal CAT scan was negative  7. Hemodynamically unstable we will continue with the current vent settings and start weaning when more hemodynamically stable       Total time spent with patient: 35 minutes.

## 2020-09-28 NOTE — PROGRESS NOTES
Progress Note    Patient: Eladio Minaya MRN: [id number]  SSN: [id number]    YOB: 1950  Age: 78 y.o. Sex: male      Admit Date: 9/25/2020    LOS: 3 days     Subjective:     69M, H/o ESRD on HD and HLD and HTN with cardiac arreast and acute hypoxic respiratory failure. He presented here from [facility] after cardiac arrest s/t possible aspiration pneumonia. Review of Systems:  Unable to determine s.t drowsiness      Objective:     Vitals:    09/28/20 1100 09/28/20 1140 09/28/20 1200 09/28/20 1300   BP: (!) 106/57  110/60 (!) 83/50   Pulse: 91 89 88 71   Resp: 25 21 12 12   Temp: (!) 96.1 °F (35.6 °C)  (!) 95.4 °F (35.2 °C) (!) 95.4 °F (35.2 °C)   SpO2: 100% 100% 100% 100%   Weight:       Height:            Intake and Output:  Current Shift: 09/28 0701 - 09/28 1900  In: -   Out: 259   Last three shifts: 09/26 1901 - 09/28 0700  In: 1104 [I.V.:864]  Out: 2677 [Urine:20]      Physical Exam:   General : Appearance:  intubated  HEENT : PERRLA, normal oral mucosa, atraumatic normocephalic, Normal ear and nose. Neck : Supple, no JVD, no masses noted, no carotid bruit  Lungs : normal breath sounds. No wheezing, no rales. He is splinting on deep breathing. CVS : Rhythm rate regular, S1+, S2+, no murmur or gallop  Abdomen : Soft, nontender, no organomegaly, bowel sounds active  Extremities : No edema noted,  pedal pulses palpable  Musculoskeletal : Fair range of motion, no joint swelling or effusion, muscle tone and power appears normal,   Skin : Moist, warm, skin turgor, no pathological rash  Lymphatic : No cervical lymphadenopathy.   Neurological :intubated  Psychiatric : cannot be assessed       Lab/Data Review:  Recent Results (from the past 24 hour(s))   GLUCOSE, POC    Collection Time: 09/27/20  3:31 PM   Result Value Ref Range    Glucose (POC) 170 (H) 65 - 100 mg/dL    Performed by DMITRI BAILON    RENAL FUNCTION PANEL    Collection Time: 09/28/20  5:30 AM   Result Value Ref Range    Sodium 133 (L) 136 - 145 mmol/L    Potassium 4.1 3.5 - 5.1 mmol/L    Chloride 102 97 - 108 mmol/L    CO2 24 21 - 32 mmol/L    Anion gap 7 5 - 15 mmol/L    Glucose 191 (H) 65 - 100 mg/dL    BUN 37 (H) 6 - 20 mg/dL    Creatinine 2.73 (H) 0.70 - 1.30 mg/dL    BUN/Creatinine ratio 14 12 - 20      GFR est AA 28 (L) >60 ml/min/1.73m2    GFR est non-AA 23 (L) >60 ml/min/1.73m2    Calcium 8.5 8.5 - 10.1 mg/dL    Phosphorus 2.8 2.6 - 4.7 mg/dL    Albumin 1.9 (L) 3.5 - 5.0 g/dL   MAGNESIUM    Collection Time: 09/28/20  5:30 AM   Result Value Ref Range    Magnesium 1.9 1.6 - 2.4 mg/dL   CBC WITH AUTOMATED DIFF    Collection Time: 09/28/20  5:30 AM   Result Value Ref Range    WBC 13.7 (H) 4.1 - 11.1 K/uL    RBC 2.96 (L) 4.10 - 5.70 M/uL    HGB 9.3 (L) 12.1 - 17.0 %    HCT 28.5 (L) 36.6 - 50.3 %    MCV 96.3 80.0 - 99.0 FL    MCH 31.4 26.0 - 34.0 PG    MCHC 32.6 30.0 - 36.5 g/dL    RDW 17.4 (H) 11.5 - 14.5 %    PLATELET 87 (L) 543 - 400 K/uL    NEUTROPHILS 83 (H) 32 - 75 %    LYMPHOCYTES 8 (L) 12 - 49 %    MONOCYTES 9 5 - 13 %    EOSINOPHILS 0 0 - 7 %    BASOPHILS 0 0 - 1 %    IMMATURE GRANULOCYTES 0 0.0 - 0.5 %    ABS. NEUTROPHILS 11.4 (H) 1.8 - 8.0 K/UL    ABS. LYMPHOCYTES 1.1 0.8 - 3.5 K/UL    ABS. MONOCYTES 1.2 (H) 0.0 - 1.0 K/UL    ABS. EOSINOPHILS 0.0 0.0 - 0.4 K/UL    ABS. BASOPHILS 0.0 0.0 - 0.1 K/UL    ABS. IMM.  GRANS. 0.1 (H) 0.00 - 0.04 K/UL    DF AUTOMATED     VANCOMYCIN, RANDOM    Collection Time: 09/28/20  5:30 AM   Result Value Ref Range    Vancomycin, random 10.4 ug/mL    Reported dose date Not provided      Reported dose: Not provided Units   GLUCOSE, POC    Collection Time: 09/28/20  8:10 AM   Result Value Ref Range    Glucose (POC) 173 (H) 65 - 100 mg/dL    Performed by Padmini Roberts, ARTERIAL    Collection Time: 09/28/20 10:45 AM   Result Value Ref Range    pH 7.54 (H) 7.35 - 7.45      PCO2 26 (L) 35 - 45 mmHg    PO2 168 (H) 75 - 100 mmHg    O2  >95 %    BICARBONATE 25 22 - 26 mmol/L    BASE EXCESS 0.9 0 - 2 mmol/L    FIO2 30 %    SITE Right Brachial      ESE'S TEST Positive     GLUCOSE, POC    Collection Time: 09/28/20 11:22 AM   Result Value Ref Range    Glucose (POC) 193 (H) 65 - 100 mg/dL    Performed by Soumya Jackson          Assessment and plan:      (1) Acute hypoxic respiratory failure : vent. AC peep 5, TV 12, Fio2 30%. (2) Cardiac arrest: likely aspiration event. On dopamine and epinephrine    (3) Septic shock: on pressors, vancomycin and cefepime    (4) aspiration pneumonia : vancomycin and cefepime    (5) AECOPD: complicates     (5) ESRD on HD: CRRT    (6) Acute encephalopathy : likely hypoxia. Treating above first then tuen off sedation to assess. Poor prognosis. Unable to reach family.          Signed By: Kimberly Delarosa MD     September 28, 2020

## 2020-09-29 NOTE — PROGRESS NOTES
Renal Daily Progress Note    Admit Date: 9/25/2020  Hospital day 4    Subjective:     CRRT system clotted-discontinued. He is intubated on propofol and 2.5 mcg/kg/min of dopamine.     Current Facility-Administered Medications   Medication Dose Route Frequency    docusate (COLACE) 50 mg/5 mL oral liquid 100 mg  100 mg Oral BID    bicarbonate dialysis (PRISMASOL) BG K 4/Ca 2.5 5000 ml solution   Extracorporeal DIALYSIS CONTINUOUS    sodium chloride (NS) flush 5-40 mL  5-40 mL IntraVENous PRN    oxyCODONE IR (ROXICODONE) tablet 5 mg  5 mg Oral Q8H PRN    ferrous sulfate tablet 325 mg  1 Tab Oral BID WITH MEALS    pantoprazole (PROTONIX) tablet 40 mg  40 mg Oral ACB    atorvastatin (LIPITOR) tablet 40 mg  40 mg Oral QHS    mirtazapine (REMERON) tablet 15 mg  15 mg Oral QHS    brimonidine (ALPHAGAN) 0.2 % ophthalmic solution 1 Drop  1 Drop Both Eyes TID    dorzolamide (TRUSOPT) 2 % ophthalmic solution 1 Drop  1 Drop Both Eyes TID    polyethylene glycol (MIRALAX) packet 17 g  17 g Oral DAILY    sevelamer carbonate (RENVELA) tab 2,400 mg  2,400 mg Oral TID WITH MEALS    VANCOMYCIN INFORMATION NOTE 1 Each  1 Each Other Rx Dosing/Monitoring    EPINEPHrine (ADRENALIN) 5 mg in 0.9% sodium chloride 250 mL infusion  0-10 mcg/min IntraVENous TITRATE    DOPamine (INTROPIN) 400 mg in dextrose 5% 250 mL infusion  0-50 mcg/kg/min IntraVENous TITRATE    sodium chloride (NS) flush 5-40 mL  5-40 mL IntraVENous PRN    acetaminophen (TYLENOL) tablet 650 mg  650 mg Oral Q6H PRN    Or    acetaminophen (TYLENOL) suppository 650 mg  650 mg Rectal Q6H PRN    polyethylene glycol (MIRALAX) packet 17 g  17 g Oral DAILY PRN    promethazine (PHENERGAN) tablet 12.5 mg  12.5 mg Oral Q6H PRN    Or    ondansetron (ZOFRAN) injection 4 mg  4 mg IntraVENous Q6H PRN    potassium chloride 10 mEq in 100 ml IVPB  20 mEq IntraVENous PRN    magnesium sulfate 2 g/50 ml IVPB (premix or compounded)  2 g IntraVENous PRN    albuterol-ipratropium (DUO-NEB) 2.5 MG-0.5 MG/3 ML  3 mL Nebulization Q6H RT    NOREPINephrine (LEVOPHED) 8 mg in 5% dextrose 250mL (32 mcg/mL) infusion  0.5-16 mcg/min IntraVENous TITRATE    heparin porcine injection 5,000 Units  5,000 Units SubCUTAneous Q8H    cefepime (MAXIPIME) 2 g in 0.9% sodium chloride (MBP/ADV) 100 mL MBP  2 g IntraVENous Q24H    propofol (DIPRIVAN) 10 mg/mL infusion  5 mcg/kg/min IntraVENous TITRATE        Review of Systems    ROS     Not obtainable  Objective:     Patient Vitals for the past 8 hrs:   BP Temp Pulse Resp SpO2   09/29/20 1353 -- -- -- -- 100 %   09/29/20 1300 (!) 118/53 -- 95 18 100 %   09/29/20 1200 (!) 142/68 -- 90 20 100 %   09/29/20 1110 130/85 98.2 °F (36.8 °C) 74 15 100 %   09/29/20 1100 (!) 93/50 97.7 °F (36.5 °C) 74 14 100 %   09/29/20 1000 132/64 -- 77 12 100 %   09/29/20 0900 112/67 97.2 °F (36.2 °C) 76 15 100 %   09/29/20 0802 -- -- 82 14 100 %   09/29/20 0800 119/61 97.3 °F (36.3 °C) -- 15 100 %   09/29/20 0700 115/63 97.5 °F (36.4 °C) -- 13 100 %     09/29 0701 - 09/29 1900  In: -   Out: 399   09/27 1901 - 09/29 0700  In: 2124.7 [I.V.:1794.7]  Out: 2949 [Urine:20]    Physical Exam:   Physical Exam   Constitutional: He is intubated. Cardiovascular: Regular rhythm. Pulmonary/Chest: He is intubated. He has rhonchi in the right lower field and the left lower field. Abdominal: Soft. Bowel sounds are decreased. Musculoskeletal:         General: No edema. Right IJ tunneled dialysis catheter  Neuro: He is not withdrawing to noxious stimuli. He is on propofol however. Length of Stay 4       XR CHEST PORT   Final Result   Impression:      Satisfactory position of endotracheal tube. Suboptimal position of enteric tube,   may be advanced 5-10 cm for optimal placement      XR CHEST PORT   Final Result   Impression: Right mainstem bronchus intubation. Repositioning needed. Underexpanded lungs with bibasilar atelectasis.       Findings conveyed to the patient's nurse [facility] on 9/29/2020 at 7:56 AM.         XR CHEST PORT   Final Result   Impression:    1. Distal tracheal intubation as described. (Please note that anterior-posterior   radiography is a one dimensional exam and cannot accurately determine the exact   position of support apparatus. If there is need to better localize structures in   space, a lateral radiograph may prove helpful, as clinically indicated). 2. No developing pulmonary parenchymal or pleural lesion. XR CHEST PORT    (Results Pending)        Data Review   Recent Labs     09/28/20  0530 09/27/20  0430   WBC 13.7* 11.0   HGB 9.3* 9.9*   HCT 28.5* 30.0*   PLT 87* 115*     Recent Labs     09/29/20  0415 09/28/20  0530 09/27/20  0430   * 133* 133*   K 4.2 4.1 4.4    102 100   CO2 24 24 21   * 191* 191*   BUN 31* 37* 70*   CREA 2.43* 2.73* 4.87*   CA 8.1* 8.5 9.1   MG 2.2 1.9 2.2   PHOS 3.2 2.8 4.5  4.6   ALB 1.8* 1.9* 2.0*     No components found for: [place]  Recent Labs     09/29/20  0342 09/28/20  1045 09/27/20  0215   PH 7.49* 7.54* 7.48*   PCO2 30* 26* 29*   PO2 141* 168* 107*   HCO3 25 25 23   FIO2 25 30 30     No results for input(s): INR, INREXT, INREXT in the last 72 hours. Assessment:           Active Problems:    Cardiac arrest ([address] (9/25/2020)    ESRD hemodialysis dependent  Respiratory failure on ventilator  Respiratory alkalosis  Thrombocytopenia  Leukocytosis with negative cultures  Modest hyponatremia  Plan:   DC CRRT  Use hemodialysis intermittently if his blood pressures are stable. Volume status is acceptable at present.

## 2020-09-29 NOTE — PROGRESS NOTES
Radiologist called and told RN that ETT was in right main bronchus. There is not indication of that on assessment,  PIP remain in low 20's and ETT is secured at the same spot as yesterday. Will wait for pulmonologist to assess.

## 2020-09-29 NOTE — PROGRESS NOTES
Pulmonary ICU Progress Note    Subjective:   Daily Progress Note: 2020 10:09 AM    CC: intubated on ventilator    HPI:  69-year-old male transferred from [facility] as he was admitted there because of generalized weakness shortness of breath abdominal pain and fluid overload she was a renal failure getting hemodialysis. Patient subsequently coded cardiac arrest ACLS protocol was done he was found to be in PEA patient revived intubated on ventilator he has cardiac arrest at least 4 times now he is on vasopressors dopamine levo fed 7 also on vancomycin Maxipime so critical care consult was called for further evaluation   intubated on ventilator hemodynamically unstable on dopamine and levo fed and also getting (continuous renal replacement therapy) CRRT    Review of Systems  Unable to obtain    Objective:     Visit Vitals  /61 (BP 1 Location: Left arm, BP Patient Position: At rest)   Pulse 82   Temp 97.3 °F (36.3 °C)   Resp 14   Ht 5' 9.02\" (1.753 m)   Wt 72.3 kg (159 lb 6.3 oz)   SpO2 100%   BMI 23.53 kg/m²      O2 Device: Ventilator    Temp (24hrs), Av.1 °F (36.2 °C), Min:95.4 °F (35.2 °C), Max:100.2 °F (37.9 °C)      701 - 1900  In: -   Out: 120   1901 -  0700  In: 2124.7 [I.V.:1794.7]  Out: 2949 [Urine:20]    Visit Vitals  /61 (BP 1 Location: Left arm, BP Patient Position: At rest)   Pulse 82   Temp 97.3 °F (36.3 °C)   Resp 14   Ht 5' 9.02\" (1.753 m)   Wt 72.3 kg (159 lb 6.3 oz)   SpO2 100%   BMI 23.53 kg/m²     General:   Sedated on ventilator   Head:  Normocephalic, without obvious abnormality, atraumatic. Nose: Nares normal. Septum midline. Mucosa normal. No drainage or sinus tenderness. Throat: Lips, mucosa, and tongue normal. Teeth and gums normal.   Neck: Supple, symmetrical, trachea midline, no adenopathy, thyroid: no enlargement/tenderness/nodules, no carotid bruit and no JVD.    Back:   Symmetric, no curvature. ROM normal. No CVA tenderness. Lungs:    Bilateral rails   Chest wall:  No tenderness or deformity. Heart:  Regular rate and rhythm, S1, S2 normal, no murmur, click, rub or gallop. Abdomen:   Soft, non-tender. Bowel sounds normal. No masses,  No organomegaly. Extremities: Extremities normal, atraumatic, no cyanosis or edema. Pulses: 2+ and symmetric all extremities.            Neurologic:  Sedated with propofol           Data Review    Recent Results (from the past 24 hour(s))   BLOOD GAS, ARTERIAL    Collection Time: 09/28/20 10:45 AM   Result Value Ref Range    pH 7.54 (H) 7.35 - 7.45      PCO2 26 (L) 35 - 45 mmHg    PO2 168 (H) 75 - 100 mmHg    O2  >95 %    BICARBONATE 25 22 - 26 mmol/L    BASE EXCESS 0.9 0 - 2 mmol/L    FIO2 30 %    SITE Right Brachial      ESE'S TEST Positive     GLUCOSE, POC    Collection Time: 09/28/20 11:22 AM   Result Value Ref Range    Glucose (POC) 193 (H) 65 - 100 mg/dL    Performed by [address] [place] [address], POC    Collection Time: 09/28/20  4:08 PM   Result Value Ref Range    Glucose (POC) 234 (H) 65 - 100 mg/dL    Performed by [facility]    BLOOD GAS, ARTERIAL    Collection Time: 09/29/20  3:42 AM   Result Value Ref Range    pH 7.49 (H) 7.35 - 7.45      PCO2 30 (L) 35 - 45 mmHg    PO2 141 (H) 75 - 100 mmHg    O2  >95 %    BICARBONATE 25 22 - 26 mmol/L    BASE EXCESS 0 0 - 2 mmol/L    FIO2 25 %    Tidal volume 500      EPAP/CPAP/PEEP 5      Sample source Arterial      SITE Left Brachial      ESE'S TEST YES     RENAL FUNCTION PANEL    Collection Time: 09/29/20  4:15 AM   Result Value Ref Range    Sodium 134 (L) 136 - 145 mmol/L    Potassium 4.2 3.5 - 5.1 mmol/L    Chloride 103 97 - 108 mmol/L    CO2 24 21 - 32 mmol/L    Anion gap 7 5 - 15 mmol/L    Glucose 178 (H) 65 - 100 mg/dL    BUN 31 (H) 6 - 20 mg/dL    Creatinine 2.43 (H) 0.70 - 1.30 mg/dL    BUN/Creatinine ratio 13 12 - 20      GFR est AA 32 (L) >60 ml/min/1.73m2    GFR est non-AA 27 (L) >60 ml/min/1.73m2    Calcium 8.1 (L) 8.5 - 10.1 mg/dL    Phosphorus 3.2 2.6 - 4.7 mg/dL    Albumin 1.8 (L) 3.5 - 5.0 g/dL   MAGNESIUM    Collection Time: 09/29/20  4:15 AM   Result Value Ref Range    Magnesium 2.2 1.6 - 2.4 mg/dL   GLUCOSE, POC    Collection Time: 09/29/20  7:36 AM   Result Value Ref Range    Glucose (POC) 198 (H) 65 - 100 mg/dL    Performed by Alexandre Sanchez        Current Facility-Administered Medications   Medication Dose Route Frequency    docusate (COLACE) 50 mg/5 mL oral liquid 100 mg  100 mg Oral BID    bicarbonate dialysis (PRISMASOL) BG K 4/Ca 2.5 5000 ml solution   Extracorporeal DIALYSIS CONTINUOUS    sodium chloride (NS) flush 5-40 mL  5-40 mL IntraVENous PRN    oxyCODONE IR (ROXICODONE) tablet 5 mg  5 mg Oral Q8H PRN    ferrous sulfate tablet 325 mg  1 Tab Oral BID WITH MEALS    pantoprazole (PROTONIX) tablet 40 mg  40 mg Oral ACB    atorvastatin (LIPITOR) tablet 40 mg  40 mg Oral QHS    mirtazapine (REMERON) tablet 15 mg  15 mg Oral QHS    brimonidine (ALPHAGAN) 0.2 % ophthalmic solution 1 Drop  1 Drop Both Eyes TID    dorzolamide (TRUSOPT) 2 % ophthalmic solution 1 Drop  1 Drop Both Eyes TID    polyethylene glycol (MIRALAX) packet 17 g  17 g Oral DAILY    sevelamer carbonate (RENVELA) tab 2,400 mg  2,400 mg Oral TID WITH MEALS    VANCOMYCIN INFORMATION NOTE 1 Each  1 Each Other Rx Dosing/Monitoring    EPINEPHrine (ADRENALIN) 5 mg in 0.9% sodium chloride 250 mL infusion  0-10 mcg/min IntraVENous TITRATE    DOPamine (INTROPIN) 400 mg in dextrose 5% 250 mL infusion  0-50 mcg/kg/min IntraVENous TITRATE    sodium chloride (NS) flush 5-40 mL  5-40 mL IntraVENous PRN    acetaminophen (TYLENOL) tablet 650 mg  650 mg Oral Q6H PRN    Or    acetaminophen (TYLENOL) suppository 650 mg  650 mg Rectal Q6H PRN    polyethylene glycol (MIRALAX) packet 17 g  17 g Oral DAILY PRN    promethazine (PHENERGAN) tablet 12.5 mg  12.5 mg Oral Q6H PRN    Or    ondansetron (ZOFRAN) injection 4 mg  4 mg IntraVENous Q6H PRN    potassium chloride 10 mEq in 100 ml IVPB  20 mEq IntraVENous PRN    magnesium sulfate 2 g/50 ml IVPB (premix or compounded)  2 g IntraVENous PRN    albuterol-ipratropium (DUO-NEB) 2.5 MG-0.5 MG/3 ML  3 mL Nebulization Q6H RT    NOREPINephrine (LEVOPHED) 8 mg in 5% dextrose 250mL (32 mcg/mL) infusion  0.5-16 mcg/min IntraVENous TITRATE    heparin porcine injection 5,000 Units  5,000 Units SubCUTAneous Q8H    cefepime (MAXIPIME) 2 g in 0.9% sodium chloride (MBP/ADV) 100 mL MBP  2 g IntraVENous Q24H    propofol (DIPRIVAN) 10 mg/mL infusion  5 mcg/kg/min IntraVENous TITRATE         Assessment/Plan:     1. Acute hypoxic respiratory failure  2. Cardiac arrest CODE BLUE x4  3. Aspiration pneumonia  4. Cardiogenic status post code versus septic shock  5. Right mainstem intubation  6. End-stage renal disease on hemodialysis  7. Chronic Obstructive Pulmonary Disease with Severe Acute Exacerbation requiring inpatient hospitalization and management; has very poor airway clearance. Increased work of breathing  8. Body mass index is 22.88 kg/m². 9. Multiorgan dysfunction as outlined above: Pt has one or more acute or chronic illnesses with severe exacerbation with progression or side effects of treatment that poses a threat to life or bodily function  10. Additional workup outlined below  11. Pt is requiring Drug therapy requiring intensive monitoring for toxicity  12. Pt is unstable, unpredictable needing inpatient monitoring; is acutely ill and at high risk of sudden decline and decompensation with severe consequenses and continued end organ dysfunction and failure  13. Prognosis guarded        RECOMMENDATIONS/PLAN:      1. Patient is intubated on ventilator arterial blood gases shows pH 7.48 PCO2 30 PO2 141 corrected we will continue the current vent settings   2. Will withdraw ET tube 2 cm repeat chest x-ray  3.  Patient on Levophed and dopamine and also getting CRRT  4. Aspiration pneumonia  5. Ventilator settings of assist-control rate of 12 tidal volume 500 PEEP of 5 FiO2 30%  6. Agree with Empiric IV antibiotics vancomycin and Maxipime pending culture results   7. Follow culture results abdominal CAT scan was negative  8. Hemodynamically unstable we will continue with the current vent settings and start weaning when more hemodynamically stable       Total time spent with patient: 35 minutes.

## 2020-09-29 NOTE — PROGRESS NOTES
CRRT machine began alarming access pressure, Dr. Gayla Al notified of stable vital signs and machine alarms. MD would like blood returned and CVVHDF discontinued if alarms continue. Alarms continued and return port not aspirating - both ports flushed easily with normal saline. Blood returned to patient (230 mL) without incident. Will continue to wean off dopamine as tolerated.

## 2020-09-29 NOTE — PROGRESS NOTES
Withdrew ETT back to [facility] per Dr. Morelos's verbal order.

## 2020-09-29 NOTE — PROGRESS NOTES
Progress Note    Patient: Jae Watts MRN: [id number]  SSN: [id number]    YOB: 1950  Age: 78 y.o. Sex: male      Admit Date: 9/25/2020    LOS: 4 days     Subjective:     69M, H/o ESRD on HD and HLD and HTN with cardiac arreast and acute hypoxic respiratory failure. He presented here from [facility] after cardiac arrest s/t possible aspiration pneumonia. Remains intubated, on propofol and 2.5 mcg/kg/min of dopamine.            Review of Systems:  Unable to determine s.t drowsiness      Objective:     Vitals:    09/29/20 1300 09/29/20 1353 09/29/20 1500 09/29/20 1616   BP: (!) 118/53  (!) 92/50    Pulse: 95  86 84   Resp: 18  16 16   Temp:   100.4 °F (38 °C)    SpO2: 100% 100% 100% 100%   Weight:       Height:            Intake and Output:  Current Shift: 09/29 0701 - 09/29 1900  In: -   Out: 399   Last three shifts: 09/27 1901 - 09/29 0700  In: 2124.7 [I.V.:1794.7]  Out: 2949 [Urine:20]      Physical Exam:   General : Appearance:  Intubated and sedate  HEENT : PERRLA, normal oral mucosa, atraumatic normocephalic, Normal ear and nose. Neck : Supple, no JVD, no masses noted, no carotid bruit  Lungs : normal breath sounds. No wheezing, no rales. CVS : Rhythm rate regular, S1+, S2+, no murmur or gallop  Abdomen : Soft, nontender, no organomegaly, bowel sounds active  Extremities : No edema noted,  pedal pulses palpable  Musculoskeletal : Fair range of motion, no joint swelling or effusion, muscle tone and power appears normal,   Skin : Moist, warm, skin turgor, no pathological rash  Lymphatic : No cervical lymphadenopathy.   Neurological :intubated  Psychiatric : cannot be assessed       Lab/Data Review:  Recent Results (from the past 24 hour(s))   BLOOD GAS, ARTERIAL    Collection Time: 09/29/20  3:42 AM   Result Value Ref Range    pH 7.49 (H) 7.35 - 7.45      PCO2 30 (L) 35 - 45 mmHg    PO2 141 (H) 75 - 100 mmHg    O2  >95 %    BICARBONATE 25 22 - 26 mmol/L    BASE EXCESS 0 0 - 2 mmol/L    FIO2 25 %    Tidal volume 500      EPAP/CPAP/PEEP 5      Sample source Arterial      SITE Left Brachial      ESE'S TEST YES     RENAL FUNCTION PANEL    Collection Time: 09/29/20  4:15 AM   Result Value Ref Range    Sodium 134 (L) 136 - 145 mmol/L    Potassium 4.2 3.5 - 5.1 mmol/L    Chloride 103 97 - 108 mmol/L    CO2 24 21 - 32 mmol/L    Anion gap 7 5 - 15 mmol/L    Glucose 178 (H) 65 - 100 mg/dL    BUN 31 (H) 6 - 20 mg/dL    Creatinine 2.43 (H) 0.70 - 1.30 mg/dL    BUN/Creatinine ratio 13 12 - 20      GFR est AA 32 (L) >60 ml/min/1.73m2    GFR est non-AA 27 (L) >60 ml/min/1.73m2    Calcium 8.1 (L) 8.5 - 10.1 mg/dL    Phosphorus 3.2 2.6 - 4.7 mg/dL    Albumin 1.8 (L) 3.5 - 5.0 g/dL   MAGNESIUM    Collection Time: 09/29/20  4:15 AM   Result Value Ref Range    Magnesium 2.2 1.6 - 2.4 mg/dL   GLUCOSE, POC    Collection Time: 09/29/20  7:36 AM   Result Value Ref Range    Glucose (POC) 198 (H) 65 - 100 mg/dL    Performed by [address] [place] [address], POC    Collection Time: 09/29/20 11:20 AM   Result Value Ref Range    Glucose (POC) 175 (H) 65 - 100 mg/dL    Performed by [address] [place] [address], POC    Collection Time: 09/29/20  3:30 PM   Result Value Ref Range    Glucose (POC) 188 (H) 65 - 100 mg/dL    Performed by Alexandre Sanchez        Current Facility-Administered Medications:     docusate (COLACE) 50 mg/5 mL oral liquid 100 mg, 100 mg, Oral, BID, Marilia Ortez MD, 100 mg at 09/29/20 3014    heparin (porcine) pf 300 Units, 300 Units, InterCATHeter, PRN, Rich Burks MD    bicarbonate dialysis (PRISMASOL) BG K 4/Ca 2.5 5000 ml solution, , Extracorporeal, DIALYSIS CONTINUOUS, Rich Burks MD, Last Rate: 800 mL/hr at 09/28/20 1608, 800 mL/hr at 09/28/20 1608    sodium chloride (NS) flush 5-40 mL, 5-40 mL, IntraVENous, PRN, Jose Enrique Connor MD    oxyCODONE IR (ROXICODONE) tablet 5 mg, 5 mg, Oral, Q8H PRN, Jose Enrique Connor MD    ferrous sulfate tablet 325 mg, 1 Tab, Oral, BID WITH MEALS, Tangela Cullen MD, 325 mg at 09/29/20 1754    pantoprazole (PROTONIX) tablet 40 mg, 40 mg, Oral, ACB, Tangela Cullen MD, 40 mg at 09/29/20 0748    atorvastatin (LIPITOR) tablet 40 mg, 40 mg, Oral, QHS, Tangela Cullen MD, 40 mg at 09/29/20 2144    mirtazapine (REMERON) tablet 15 mg, 15 mg, Oral, QHS, Tangela Cullen MD, 15 mg at 09/29/20 2144    brimonidine (ALPHAGAN) 0.2 % ophthalmic solution 1 Drop, 1 Drop, Both Eyes, TID, Tangela Cullen MD, 1 Drop at 09/29/20 2145    dorzolamide (TRUSOPT) 2 % ophthalmic solution 1 Drop, 1 Drop, Both Eyes, TID, Tangela Cullen MD, 1 Drop at 09/29/20 2145    polyethylene glycol (MIRALAX) packet 17 g, 17 g, Oral, DAILY, Tangela Cullen MD, 17 g at 09/29/20 0801    sevelamer carbonate (RENVELA) tab 2,400 mg, 2,400 mg, Oral, TID WITH MEALS, Tangela Cullen MD, 2,400 mg at 09/29/20 1754    VANCOMYCIN INFORMATION NOTE 1 Each, 1 Each, Other, Rx Dosing/Monitoring, Tangela Cullen MD    EPINEPHrine (ADRENALIN) 5 mg in 0.9% sodium chloride 250 mL infusion, 0-10 mcg/min, IntraVENous, TITRATE, Tangela Cullen MD, Last Rate: 6 mL/hr at 09/26/20 0109, 2 mcg/min at 09/26/20 0109    DOPamine (INTROPIN) 400 mg in dextrose 5% 250 mL infusion, 0-50 mcg/kg/min, IntraVENous, TITRATE, Tangela Cullen MD, Last Rate: 12.9 mL/hr at 09/29/20 1501, 5 mcg/kg/min at 09/29/20 1501    sodium chloride (NS) flush 5-40 mL, 5-40 mL, IntraVENous, PRN, Tangela Cullen MD    acetaminophen (TYLENOL) tablet 650 mg, 650 mg, Oral, Q6H PRN **OR** acetaminophen (TYLENOL) suppository 650 mg, 650 mg, Rectal, Q6H PRN, Tangela Cullen MD    polyethylene glycol (MIRALAX) packet 17 g, 17 g, Oral, DAILY PRN, Tangela Cullen MD    promethazine (PHENERGAN) tablet 12.5 mg, 12.5 mg, Oral, Q6H PRN **OR** ondansetron (ZOFRAN) injection 4 mg, 4 mg, IntraVENous, Q6H PRN, Tangela Cullen MD    potassium chloride 10 mEq in 100 ml IVPB, 20 mEq, IntraVENous, PRN, MD Eliza Jorgensen magnesium sulfate 2 g/50 ml IVPB (premix or compounded), 2 g, IntraVENous, PRN, Michelle Candelaria MD    albuterol-ipratropium (DUO-NEB) 2.5 MG-0.5 MG/3 ML, 3 mL, Nebulization, Q6H RT, Michelle Candelaria MD, 3 mL at 09/30/20 0206    NOREPINephrine (LEVOPHED) 8 mg in 5% dextrose 250mL (32 mcg/mL) infusion, 0.5-16 mcg/min, IntraVENous, TITRATE, Michelle Candelaria MD, Last Rate: 4.7 mL/hr at 09/28/20 1804, 2.5 mcg/min at 09/28/20 1804    heparin porcine injection 5,000 Units, 5,000 Units, SubCUTAneous, Q8H, Michelle Candelaria MD, 5,000 Units at 09/29/20 2300    cefepime (MAXIPIME) 2 g in 0.9% sodium chloride (MBP/ADV) 100 mL MBP, 2 g, IntraVENous, Q24H, Oswaldo BILLINGSLEY MD, Last Rate: 200 mL/hr at 09/29/20 0749, 2 g at 09/29/20 0749    propofol (DIPRIVAN) 10 mg/mL infusion, 5 mcg/kg/min, IntraVENous, TITRATE, Michelle Candelaria MD, Last Rate: 8.2 mL/hr at 09/30/20 0221, 20 mcg/kg/min at 09/30/20 0221      Assessment and plan:      (1) Acute hypoxic respiratory failure : intubated/sedated, pulmonology following    (2) Cardiac arrest: likely aspiration event. On dopamine/levophed    (3) Septic shock: on pressors, vancomycin and cefepime fu cs    (4) aspiration pneumonia : vancomycin and cefepime    (5) AECOPD: complicates     (5) ESRD on HD: CRRT, nephrology following    (6) Acute encephalopathy : likely hypoxia. Treating above first then tuen off sedation to assess. Poor prognosis.   Continue icu care  Was unable to reach family        Signed By: Kelsea Cabrera MD     September 29, 2020

## 2020-09-30 NOTE — PROGRESS NOTES
13: 08PM Recv'd fax confirmation for [facility] Transfer paperwork. Maye Alvarado, MSW  [address], MSW      12:24PM [facility] Transfer forms faxed to [employer] via [facility]. CORI to call and verify receipt of forms. Maye Alvarado, MSW      10:30AM Outbound call to listed emergency contact Skip Salazar (sister) @ [phone].  left w/direct contact information, requesting a return phone call. Maye Alvarado, MSW        10:15AM SW spoke w/Sophia Velazquez, RN ([id number]) @ [phone].   SW to send [place] Transfer forms to [address] [place] [address], MSW

## 2020-09-30 NOTE — PROGRESS NOTES
Renal Daily Progress Note    Admit Date: 9/25/2020  Hospital day 5    Subjective:     CRRT system clotted-discontinued on 9/29/20. He is intubated on propofol and 5 mcg/kg/min of dopamine. He was apparently bradycardic this morning requiring higher dose of dopamine.     Current Facility-Administered Medications   Medication Dose Route Frequency    docusate (COLACE) 50 mg/5 mL oral liquid 100 mg  100 mg Oral BID    heparin (porcine) pf 300 Units  300 Units InterCATHeter PRN    bicarbonate dialysis (PRISMASOL) BG K 4/Ca 2.5 5000 ml solution   Extracorporeal DIALYSIS CONTINUOUS    sodium chloride (NS) flush 5-40 mL  5-40 mL IntraVENous PRN    oxyCODONE IR (ROXICODONE) tablet 5 mg  5 mg Oral Q8H PRN    ferrous sulfate tablet 325 mg  1 Tab Oral BID WITH MEALS    pantoprazole (PROTONIX) tablet 40 mg  40 mg Oral ACB    atorvastatin (LIPITOR) tablet 40 mg  40 mg Oral QHS    mirtazapine (REMERON) tablet 15 mg  15 mg Oral QHS    brimonidine (ALPHAGAN) 0.2 % ophthalmic solution 1 Drop  1 Drop Both Eyes TID    dorzolamide (TRUSOPT) 2 % ophthalmic solution 1 Drop  1 Drop Both Eyes TID    polyethylene glycol (MIRALAX) packet 17 g  17 g Oral DAILY    sevelamer carbonate (RENVELA) tab 2,400 mg  2,400 mg Oral TID WITH MEALS    VANCOMYCIN INFORMATION NOTE 1 Each  1 Each Other Rx Dosing/Monitoring    EPINEPHrine (ADRENALIN) 5 mg in 0.9% sodium chloride 250 mL infusion  0-10 mcg/min IntraVENous TITRATE    DOPamine (INTROPIN) 400 mg in dextrose 5% 250 mL infusion  0-50 mcg/kg/min IntraVENous TITRATE    sodium chloride (NS) flush 5-40 mL  5-40 mL IntraVENous PRN    acetaminophen (TYLENOL) tablet 650 mg  650 mg Oral Q6H PRN    Or    acetaminophen (TYLENOL) suppository 650 mg  650 mg Rectal Q6H PRN    polyethylene glycol (MIRALAX) packet 17 g  17 g Oral DAILY PRN    promethazine (PHENERGAN) tablet 12.5 mg  12.5 mg Oral Q6H PRN    Or    ondansetron (ZOFRAN) injection 4 mg  4 mg IntraVENous Q6H PRN    potassium chloride 10 mEq in 100 ml IVPB  20 mEq IntraVENous PRN    magnesium sulfate 2 g/50 ml IVPB (premix or compounded)  2 g IntraVENous PRN    albuterol-ipratropium (DUO-NEB) 2.5 MG-0.5 MG/3 ML  3 mL Nebulization Q6H RT    NOREPINephrine (LEVOPHED) 8 mg in 5% dextrose 250mL (32 mcg/mL) infusion  0.5-16 mcg/min IntraVENous TITRATE    heparin porcine injection 5,000 Units  5,000 Units SubCUTAneous Q8H    cefepime (MAXIPIME) 2 g in 0.9% sodium chloride (MBP/ADV) 100 mL MBP  2 g IntraVENous Q24H    propofol (DIPRIVAN) 10 mg/mL infusion  5 mcg/kg/min IntraVENous TITRATE        Review of Systems    Review of Systems   Neurological: Sensory change: 5. Not obtainable  Objective:     Patient Vitals for the past 8 hrs:   BP Pulse Resp SpO2   09/30/20 1221 (!) 104/55 77 23 100 %   09/30/20 1127 (!) 110/54 -- 15 100 %   09/30/20 1025 121/73 -- 15 100 %   09/30/20 0900 (!) 107/52 -- 15 100 %   09/30/20 0800 (!) 106/47 -- 15 100 %     No intake/output data recorded. 09/28 1901 - 09/30 0700  In: 1207.9 [I.V.:997.9]  Out: 1439     Physical Exam:   Physical Exam   Constitutional: He is intubated. Cardiovascular: Regular rhythm. Pulmonary/Chest: He is intubated. He has rhonchi in the right lower field and the left lower field. Abdominal: Soft. Bowel sounds are decreased. Musculoskeletal:         General: No edema. Right IJ tunneled dialysis catheter  Neuro: He is not withdrawing to noxious stimuli. He is on propofol however. Length of Stay 5       XR CHEST PORT   Final Result   IMPRESSION:   1. No new acute process. 2.  Support devices as above. XR CHEST PORT   Final Result   Impression:      Satisfactory position of endotracheal tube. Suboptimal position of enteric tube,   may be advanced 5-10 cm for optimal placement      XR CHEST PORT   Final Result   Impression: Right mainstem bronchus intubation. Repositioning needed. Underexpanded lungs with bibasilar atelectasis.       Findings conveyed to the patient's nurse [facility] on 9/29/2020 at 7:56 AM.         XR CHEST PORT   Final Result   Impression:    1. Distal tracheal intubation as described. (Please note that anterior-posterior   radiography is a one dimensional exam and cannot accurately determine the exact   position of support apparatus. If there is need to better localize structures in   space, a lateral radiograph may prove helpful, as clinically indicated). 2. No developing pulmonary parenchymal or pleural lesion. XR CHEST PORT    (Results Pending)        Data Review   Recent Labs     09/30/20  0909 09/30/20 0410 09/28/20  0530   WBC  --  14.6* 13.7*   HGB 8.2* 7.1* 9.3*   HCT 25.3* 21.7* 28.5*   PLT  --  82* 87*     Recent Labs     09/30/20 0410 09/29/20 0415 09/28/20  0530   * 134* 133*   K 3.8 4.2 4.1    103 102   CO2 22 24 24   * 178* 191*   BUN 40* 31* 37*   CREA 3.25* 2.43* 2.73*   CA 8.2* 8.1* 8.5   MG 2.4 2.2 1.9   PHOS 3.2  3.2 3.2 2.8   ALB 1.6* 1.8* 1.9*     No components found for: [place]  Recent Labs     09/30/20 0415 09/29/20  0342 09/28/20  1045   PH 7.46* 7.49* 7.54*   PCO2 31* 30* 26*   PO2 90 141* 168*   HCO3 23 25 25   FIO2 25 25 30     No results for input(s): INR, INREXT, INREXT, INREXT in the last 72 hours. Assessment:           Active Problems:    Cardiac arrest ([address] (9/25/2020)    ESRD hemodialysis dependent  Respiratory failure on ventilator  Respiratory alkalosis  Thrombocytopenia  Leukocytosis with negative cultures  Modest hyponatremia  Anemia  Plan:   Off CRRT since 9/29/2020. Will likely need hemodialysis tomorrow.   We will follow renal function

## 2020-09-30 NOTE — PROGRESS NOTES
Pulmonary ICU Progress Note    Subjective:   Daily Progress Note: 2020 10:09 AM    CC: intubated on ventilator    HPI:  67-year-old male transferred from [facility] as he was admitted there because of generalized weakness shortness of breath abdominal pain and fluid overload she was a renal failure getting hemodialysis. Patient subsequently coded cardiac arrest ACLS protocol was done he was found to be in PEA patient revived intubated on ventilator he has cardiac arrest at least 4 times now he is on vasopressors dopamine levo fed 7 also on vancomycin Maxipime so critical care consult was called for further evaluation   intubated on ventilator hemodynamically unstable on dopamine and levo fed and also getting (continuous renal replacement therapy) CRRT    Review of Systems  Unable to obtain    Objective:     Visit Vitals  BP (!) 125/49 (BP 1 Location: Left arm, BP Patient Position: At rest)   Pulse 77   Temp 98.4 °F (36.9 °C)   Resp 15   Ht 5' 9.02\" (1.753 m)   Wt 72.3 kg (159 lb 6.3 oz)   SpO2 100%   BMI 23.53 kg/m²      O2 Device: Ventilator    Temp (24hrs), Av.2 °F (37.3 °C), Min:97.7 °F (36.5 °C), Max:100.4 °F (38 °C)      No intake/output data recorded.  1901 -  0700  In: 1207.9 [I.V.:997.9]  Out: 1439     Visit Vitals  BP (!) 125/49 (BP 1 Location: Left arm, BP Patient Position: At rest)   Pulse 77   Temp 98.4 °F (36.9 °C)   Resp 15   Ht 5' 9.02\" (1.753 m)   Wt 72.3 kg (159 lb 6.3 oz)   SpO2 100%   BMI 23.53 kg/m²     General:   Sedated on ventilator   Head:  Normocephalic, without obvious abnormality, atraumatic. Nose: Nares normal. Septum midline. Mucosa normal. No drainage or sinus tenderness. Throat: Lips, mucosa, and tongue normal. Teeth and gums normal.   Neck: Supple, symmetrical, trachea midline, no adenopathy, thyroid: no enlargement/tenderness/nodules, no carotid bruit and no JVD. Back:   Symmetric, no curvature.  ROM normal. No CVA tenderness. Lungs:    Bilateral rails   Chest wall:  No tenderness or deformity. Heart:  Regular rate and rhythm, S1, S2 normal, no murmur, click, rub or gallop. Abdomen:   Soft, non-tender. Bowel sounds normal. No masses,  No organomegaly. Extremities: Extremities normal, atraumatic, no cyanosis or edema. Pulses: 2+ and symmetric all extremities.            Neurologic:  Sedated with propofol           Data Review    Recent Results (from the past 24 hour(s))   GLUCOSE, POC    Collection Time: 09/29/20 11:20 AM   Result Value Ref Range    Glucose (POC) 175 (H) 65 - 100 mg/dL    Performed by [address] [place] [address], POC    Collection Time: 09/29/20  3:30 PM   Result Value Ref Range    Glucose (POC) 188 (H) 65 - 100 mg/dL    Performed by [address] [place] [address], POC    Collection Time: 09/29/20  9:08 PM   Result Value Ref Range    Glucose (POC) 165 (H) 65 - 100 mg/dL    Performed by Nikki Mcfarland    RENAL FUNCTION PANEL    Collection Time: 09/30/20  4:10 AM   Result Value Ref Range    Sodium 135 (L) 136 - 145 mmol/L    Potassium 3.8 3.5 - 5.1 mmol/L    Chloride 105 97 - 108 mmol/L    CO2 22 21 - 32 mmol/L    Anion gap 8 5 - 15 mmol/L    Glucose 163 (H) 65 - 100 mg/dL    BUN 40 (H) 6 - 20 mg/dL    Creatinine 3.25 (H) 0.70 - 1.30 mg/dL    BUN/Creatinine ratio 12 12 - 20      GFR est AA 23 (L) >60 ml/min/1.73m2    GFR est non-AA 19 (L) >60 ml/min/1.73m2    Calcium 8.2 (L) 8.5 - 10.1 mg/dL    Phosphorus 3.2 2.6 - 4.7 mg/dL    Albumin 1.6 (L) 3.5 - 5.0 g/dL   MAGNESIUM    Collection Time: 09/30/20  4:10 AM   Result Value Ref Range    Magnesium 2.4 1.6 - 2.4 mg/dL   PHOSPHORUS    Collection Time: 09/30/20  4:10 AM   Result Value Ref Range    Phosphorus 3.2 2.6 - 4.7 mg/dL   CBC WITH AUTOMATED DIFF    Collection Time: 09/30/20  4:10 AM   Result Value Ref Range    WBC 14.6 (H) 4.1 - 11.1 K/uL    RBC 2.25 (L) 4.10 - 5.70 M/uL    HGB 7.1 (L) 12.1 - 17.0 %    HCT 21.7 (L) 36.6 - 50.3 %    MCV 96.4 80.0 - 99.0 FL    MCH 31.6 26.0 - 34.0 PG    MCHC 32.7 30.0 - 36.5 g/dL    RDW 17.9 (H) 11.5 - 14.5 %    PLATELET 82 (L) 376 - 400 K/uL    MPV 13.7 (H) 8.9 - 12.9 FL    NEUTROPHILS 84 (H) 32 - 75 %    LYMPHOCYTES 5 (L) 12 - 49 %    MONOCYTES 10 5 - 13 %    EOSINOPHILS 0 0 - 7 %    BASOPHILS 0 0 - 1 %    IMMATURE GRANULOCYTES 1 (H) 0.0 - 0.5 %    ABS. NEUTROPHILS 12.4 (H) 1.8 - 8.0 K/UL    ABS. LYMPHOCYTES 0.7 (L) 0.8 - 3.5 K/UL    ABS. MONOCYTES 1.5 (H) 0.0 - 1.0 K/UL    ABS. EOSINOPHILS 0.0 0.0 - 0.4 K/UL    ABS. BASOPHILS 0.0 0.0 - 0.1 K/UL    ABS. IMM.  GRANS. 0.1 (H) 0.00 - 0.04 K/UL    DF AUTOMATED     BLOOD GAS, ARTERIAL    Collection Time: 09/30/20  4:15 AM   Result Value Ref Range    pH 7.46 (H) 7.35 - 7.45      PCO2 31 (L) 35 - 45 mmHg    PO2 90 75 - 100 mmHg    O2 SAT 98 >95 %    BICARBONATE 23 22 - 26 mmol/L    BASE DEFICIT 1.5 0 - 2 mmol/L    FIO2 25 %    Tidal volume 500      High PEEP 5      Sample source Arterial      SITE Right Radial      ESE'S TEST YES         Current Facility-Administered Medications   Medication Dose Route Frequency    docusate (COLACE) 50 mg/5 mL oral liquid 100 mg  100 mg Oral BID    heparin (porcine) pf 300 Units  300 Units InterCATHeter PRN    bicarbonate dialysis (PRISMASOL) BG K 4/Ca 2.5 5000 ml solution   Extracorporeal DIALYSIS CONTINUOUS    sodium chloride (NS) flush 5-40 mL  5-40 mL IntraVENous PRN    oxyCODONE IR (ROXICODONE) tablet 5 mg  5 mg Oral Q8H PRN    ferrous sulfate tablet 325 mg  1 Tab Oral BID WITH MEALS    pantoprazole (PROTONIX) tablet 40 mg  40 mg Oral ACB    atorvastatin (LIPITOR) tablet 40 mg  40 mg Oral QHS    mirtazapine (REMERON) tablet 15 mg  15 mg Oral QHS    brimonidine (ALPHAGAN) 0.2 % ophthalmic solution 1 Drop  1 Drop Both Eyes TID    dorzolamide (TRUSOPT) 2 % ophthalmic solution 1 Drop  1 Drop Both Eyes TID    polyethylene glycol (MIRALAX) packet 17 g  17 g Oral DAILY    sevelamer carbonate (RENVELA) tab 2,400 mg  2,400 mg Oral TID WITH MEALS    VANCOMYCIN INFORMATION NOTE 1 Each  1 Each Other Rx Dosing/Monitoring    EPINEPHrine (ADRENALIN) 5 mg in 0.9% sodium chloride 250 mL infusion  0-10 mcg/min IntraVENous TITRATE    DOPamine (INTROPIN) 400 mg in dextrose 5% 250 mL infusion  0-50 mcg/kg/min IntraVENous TITRATE    sodium chloride (NS) flush 5-40 mL  5-40 mL IntraVENous PRN    acetaminophen (TYLENOL) tablet 650 mg  650 mg Oral Q6H PRN    Or    acetaminophen (TYLENOL) suppository 650 mg  650 mg Rectal Q6H PRN    polyethylene glycol (MIRALAX) packet 17 g  17 g Oral DAILY PRN    promethazine (PHENERGAN) tablet 12.5 mg  12.5 mg Oral Q6H PRN    Or    ondansetron (ZOFRAN) injection 4 mg  4 mg IntraVENous Q6H PRN    potassium chloride 10 mEq in 100 ml IVPB  20 mEq IntraVENous PRN    magnesium sulfate 2 g/50 ml IVPB (premix or compounded)  2 g IntraVENous PRN    albuterol-ipratropium (DUO-NEB) 2.5 MG-0.5 MG/3 ML  3 mL Nebulization Q6H RT    NOREPINephrine (LEVOPHED) 8 mg in 5% dextrose 250mL (32 mcg/mL) infusion  0.5-16 mcg/min IntraVENous TITRATE    heparin porcine injection 5,000 Units  5,000 Units SubCUTAneous Q8H    cefepime (MAXIPIME) 2 g in 0.9% sodium chloride (MBP/ADV) 100 mL MBP  2 g IntraVENous Q24H    propofol (DIPRIVAN) 10 mg/mL infusion  5 mcg/kg/min IntraVENous TITRATE         Assessment/Plan:     1. Acute hypoxic respiratory failure  2. Cardiac arrest CODE BLUE x4  3. Aspiration pneumonia  4. Cardiogenic status post code versus septic shock  5. Right mainstem intubation  6. End-stage renal disease on hemodialysis  7. Chronic Obstructive Pulmonary Disease with Severe Acute Exacerbation requiring inpatient hospitalization and management; has very poor airway clearance. Increased work of breathing  8. Body mass index is 22.88 kg/m².   9. Multiorgan dysfunction as outlined above: Pt has one or more acute or chronic illnesses with severe exacerbation with progression or side effects of treatment that poses a threat to life or bodily function  10. Additional workup outlined below  11. Pt is requiring Drug therapy requiring intensive monitoring for toxicity  12. Pt is unstable, unpredictable needing inpatient monitoring; is acutely ill and at high risk of sudden decline and decompensation with severe consequenses and continued end organ dysfunction and failure  13. Prognosis guarded        RECOMMENDATIONS/PLAN:      1. Patient is intubated on ventilator arterial blood gases shows pH 7.48 PCO2 31 PO2 90 corrected we will continue the current vent settings will discontinue propofol and start weaning when more awake and hemodynamically stable he is still on dopamine  2. Will withdraw ET tube 2 cm repeat chest x-ray  3. Patient on dopamine and also getting CRRT Levophed has been weaned off  4. Aspiration pneumonia sputum came positive for gram-positive cocci in chain and also in clusters and also has some yeast most likely contamination or colonization  5. Ventilator settings of assist-control rate of 12 tidal volume 500 PEEP of 5 FiO2 30%  6. Agree with Empiric IV antibiotics vancomycin and Maxipime pending culture results   7. Follow culture results abdominal CAT scan was negative  8. Hemodynamically unstable we will continue with the current vent settings and start weaning today       Total time spent with patient: 35 minutes.

## 2020-10-01 NOTE — PROGRESS NOTES
Son Hari Dang came to see patient, states that he wants everything done for patient at this time. States that he will speak with other son and will call if anything changes with code status.      Hari Dang [phone]

## 2020-10-01 NOTE — PROGRESS NOTES
Progress Note    Patient: Danny Tavarez MRN: [id number]  SSN: [id number]    YOB: 1950  Age: 78 y.o. Sex: male      Admit Date: 9/25/2020    LOS: 6 days     Subjective:     69M, H/o ESRD on HD and HLD and HTN with cardiac arrest and acute hypoxic respiratory failure. He presented here from [facility] after cardiac arrest s/t possible aspiration pneumonia. Remains intubated, on propofol and 2.5 mcg/kg/min of dopamine, inc 2/2 bob.     Remains intubated/sedated, dopamine      Review of Systems:  Unable to determine s/t intubated/sedate      Objective:   Patient Vitals for the past 8 hrs:    BP Pulse Resp SpO2   09/30/20 1221 (!) 104/55 77 23 100 %   09/30/20 1127 (!) 110/54 -- 15 100 %   09/30/20 1025 121/73 -- 15 100 %   09/30/20 0900 (!) 107/52 -- 15 100 %   09/30/20 0800 (!) 106/47 -- 15 100 %         Physical Exam:   General : Appearance:  Intubated and sedate  HEENT : PERRLA, normal oral mucosa, atraumatic normocephalic, Normal ear and nose. Neck : Supple, no JVD, no masses noted, no carotid bruit  Lungs : normal breath sounds. No wheezing, no rales. CVS : Rhythm rate regular, S1+, S2+, no murmur or gallop  Abdomen : Soft, nontender, no organomegaly, bowel sounds active  Extremities : No edema noted,  pedal pulses palpable  Musculoskeletal : Fair range of motion, no joint swelling or effusion, muscle tone and power appears normal,   Skin : Moist, warm, skin turgor, no pathological rash  Lymphatic : No cervical lymphadenopathy.   Neurological :intubated  Psychiatric : cannot be assessed       Lab/Data Review:  Recent Results (from the past 24 hour(s))   GLUCOSE, POC    Collection Time: 09/30/20  9:33 PM   Result Value Ref Range    Glucose (POC) 172 (H) 65 - 100 mg/dL    Performed by Shayan Rasmussen    BLOOD GAS, ARTERIAL    Collection Time: 10/01/20  2:15 AM   Result Value Ref Range    pH 7.49 (H) 7.35 - 7.45      PCO2 28 (L) 35 - 45 mmHg    PO2 103 (H) 75 - 100 mmHg    O2 SAT 99 >95 % BICARBONATE 23 22 - 26 mmol/L    BASE DEFICIT 2.2 (H) 0 - 2 mmol/L    O2 METHOD VENT      FIO2 25 %    MODE Assist Control/Volume Control      Tidal volume 500      SET RATE 12      EPAP/CPAP/PEEP 5      SITE Left Brachial      ESE'S TEST PASS     VANCOMYCIN, RANDOM    Collection Time: 10/01/20  5:16 AM   Result Value Ref Range    Vancomycin, random 14.3 ug/mL    Reported dose date Not provided      Reported dose: Not provided Units       Current Facility-Administered Medications:     epoetin giulia (EPOGEN;PROCRIT) injection 8,000 Units, 8,000 Units, IntraVENous, DIALYSIS TUE, THU & SAT, Rich Burks MD, 8,000 Units at 10/01/20 1400    vancomycin (VANCOCIN) 1,000 mg in 0.9% sodium chloride 250 mL (VIAL-MATE), 1,000 mg, IntraVENous, ONCE, Franco Baires MD    [START ON 10/3/2020] Vancomycin Random Blood Level, , Other, ONCE, Franco Tovar MD    docusate (COLACE) 50 mg/5 mL oral liquid 100 mg, 100 mg, Oral, BID, Pia Walsh MD, 100 mg at 10/01/20 0920    heparin (porcine) pf 300 Units, 300 Units, InterCATHeter, PRN, Rich Burks MD    bicarbonate dialysis (PRISMASOL) BG K 4/Ca 2.5 5000 ml solution, , Extracorporeal, DIALYSIS CONTINUOUS, Rich Burks MD, Last Rate: 800 mL/hr at 09/28/20 1608, 800 mL/hr at 09/28/20 1608    sodium chloride (NS) flush 5-40 mL, 5-40 mL, IntraVENous, PRN, Alvin Marcum MD    oxyCODONE IR (ROXICODONE) tablet 5 mg, 5 mg, Oral, Q8H PRN, Alvin Marcum MD    ferrous sulfate tablet 325 mg, 1 Tab, Oral, BID WITH MEALS, Alvin Marcum MD, 325 mg at 10/01/20 0921    pantoprazole (PROTONIX) tablet 40 mg, 40 mg, Oral, ACB, Alvin Marcum MD, 40 mg at 10/01/20 1112    atorvastatin (LIPITOR) tablet 40 mg, 40 mg, Oral, QHS, Alvin Marcum MD, 40 mg at 09/30/20 2228    mirtazapine (REMERON) tablet 15 mg, 15 mg, Oral, QHS, Alvin Marcum MD, 15 mg at 09/30/20 2228    brimonidine (ALPHAGAN) 0.2 % ophthalmic solution 1 Drop, 1 Drop, Both Eyes, TID, Vanessa Garcia MD, 1 Drop at 10/01/20 1524    dorzolamide (TRUSOPT) 2 % ophthalmic solution 1 Drop, 1 Drop, Both Eyes, TID, Vanessa Garcia MD, 1 Drop at 10/01/20 1524    polyethylene glycol (MIRALAX) packet 17 g, 17 g, Oral, DAILY, Vanessa Garcia MD, 17 g at 10/01/20 9605    sevelamer carbonate (RENVELA) tab 2,400 mg, 2,400 mg, Oral, TID WITH MEALS, Vanessa Garcia MD, 2,400 mg at 10/01/20 1523    VANCOMYCIN INFORMATION NOTE 1 Each, 1 Each, Other, Rx Dosing/Monitoring, Vanessa Garcia MD    EPINEPHrine (ADRENALIN) 5 mg in 0.9% sodium chloride 250 mL infusion, 0-10 mcg/min, IntraVENous, TITRATE, Vanessa Garcia MD, Last Rate: 6 mL/hr at 09/26/20 0109, 2 mcg/min at 09/26/20 0109    DOPamine (INTROPIN) 400 mg in dextrose 5% 250 mL infusion, 0-50 mcg/kg/min, IntraVENous, TITRATE, Vanessa Garcia MD, Last Rate: 12.9 mL/hr at 10/01/20 0706, 5 mcg/kg/min at 10/01/20 0706    sodium chloride (NS) flush 5-40 mL, 5-40 mL, IntraVENous, PRN, Vanessa Garcia MD    acetaminophen (TYLENOL) tablet 650 mg, 650 mg, Oral, Q6H PRN **OR** acetaminophen (TYLENOL) suppository 650 mg, 650 mg, Rectal, Q6H PRN, Vanessa Garcia MD    polyethylene glycol (MIRALAX) packet 17 g, 17 g, Oral, DAILY PRN, Vanessa Garcia MD    promethazine (PHENERGAN) tablet 12.5 mg, 12.5 mg, Oral, Q6H PRN **OR** ondansetron (ZOFRAN) injection 4 mg, 4 mg, IntraVENous, Q6H PRN, Vanessa Garcia MD    potassium chloride 10 mEq in 100 ml IVPB, 20 mEq, IntraVENous, PRN, Vanessa Garcia MD, Last Rate: 200 mL/hr at 10/01/20 0921, 20 mEq at 10/01/20 0921    magnesium sulfate 2 g/50 ml IVPB (premix or compounded), 2 g, IntraVENous, PRN, Vanessa Garcia MD, Last Rate: 50 mL/hr at 10/01/20 0921, 2 g at 10/01/20 0921    albuterol-ipratropium (DUO-NEB) 2.5 MG-0.5 MG/3 ML, 3 mL, Nebulization, Q6H RT, Vanessa Garcia MD, 3 mL at 10/01/20 1258    NOREPINephrine (LEVOPHED) 8 mg in 5% dextrose 250mL (32 mcg/mL) infusion, 0.5-16 mcg/min, IntraVENous, TITRATE, Godwin Saldivar MD, Last Rate: 4.7 mL/hr at 09/28/20 1804, 2.5 mcg/min at 09/28/20 1804    heparin porcine injection 5,000 Units, 5,000 Units, SubCUTAneous, Q8H, Godwin Saldivar MD, 5,000 Units at 10/01/20 0608    cefepime (MAXIPIME) 2 g in 0.9% sodium chloride (MBP/ADV) 100 mL MBP, 2 g, IntraVENous, Q24H, Godwin Saldivar MD, Last Rate: 200 mL/hr at 10/01/20 0923, 2 g at 10/01/20 5568    propofol (DIPRIVAN) 10 mg/mL infusion, 5 mcg/kg/min, IntraVENous, TITRATE, Godwin Saldivar MD, Last Rate: 8.2 mL/hr at 10/01/20 0317, 20 mcg/kg/min at 10/01/20 0317      Assessment and plan:      (1) Acute hypoxic respiratory failure : intubated/sedated, pulmonology following    (2) Cardiac arre x 4 + multiorgan dysfunction: likely aspiration event. On dopamine 2/2 bob    (3) Septic shock: on pressors, vancomycin and cefepime fu cs    (4) aspiration pneumonia : vancomycin and cefepime    (5) AECOPD: complicates     (5) ESRD on HD: off crrt from 9/29,nephrology following. (6) Acute encephalopathy : likely hypoxia. Treating above first then tuen off sedation to assess. Poor prognosis.   Continue icu care  Was unable to reach family        Signed By: Africa Walton MD     October 1, 2020

## 2020-10-01 NOTE — PROGRESS NOTES
4 eyes skin assessment completed with Mark Isabel RN. Skin warm, dry, and intact. Healed scars noted on bilat arms. Mepilex applied to protect sacrum.

## 2020-10-01 NOTE — PROGRESS NOTES
Progress Note    Patient: Jordyn Dias MRN: [id number]  SSN: [id number]    YOB: 1950  Age: 78 y.o. Sex: male      Admit Date: 9/25/2020    LOS: 6 days     Subjective:     69M, H/o ESRD on HD and HLD and HTN with cardiac arrest and acute hypoxic respiratory failure. He presented here from [facility] after cardiac arrest s/t possible aspiration pneumonia. Remains intubated, on propofol and 2.5 mcg/kg/min of dopamine, inc 2/2 bob.            Review of Systems:  Unable to determine s/t intubated/sedate      Objective:   Patient Vitals for the past 8 hrs:    BP Pulse Resp SpO2   09/30/20 1221 (!) 104/55 77 23 100 %   09/30/20 1127 (!) 110/54 -- 15 100 %   09/30/20 1025 121/73 -- 15 100 %   09/30/20 0900 (!) 107/52 -- 15 100 %   09/30/20 0800 (!) 106/47 -- 15 100 %         Physical Exam:   General : Appearance:  Intubated and sedate  HEENT : PERRLA, normal oral mucosa, atraumatic normocephalic, Normal ear and nose. Neck : Supple, no JVD, no masses noted, no carotid bruit  Lungs : normal breath sounds. No wheezing, no rales. CVS : Rhythm rate regular, S1+, S2+, no murmur or gallop  Abdomen : Soft, nontender, no organomegaly, bowel sounds active  Extremities : No edema noted,  pedal pulses palpable  Musculoskeletal : Fair range of motion, no joint swelling or effusion, muscle tone and power appears normal,   Skin : Moist, warm, skin turgor, no pathological rash  Lymphatic : No cervical lymphadenopathy.   Neurological :intubated  Psychiatric : cannot be assessed       Lab/Data Review:  Recent Results (from the past 24 hour(s))   RENAL FUNCTION PANEL    Collection Time: 09/30/20  4:10 AM   Result Value Ref Range    Sodium 135 (L) 136 - 145 mmol/L    Potassium 3.8 3.5 - 5.1 mmol/L    Chloride 105 97 - 108 mmol/L    CO2 22 21 - 32 mmol/L    Anion gap 8 5 - 15 mmol/L    Glucose 163 (H) 65 - 100 mg/dL    BUN 40 (H) 6 - 20 mg/dL    Creatinine 3.25 (H) 0.70 - 1.30 mg/dL    BUN/Creatinine ratio 12 12 - 20      GFR est AA 23 (L) >60 ml/min/1.73m2    GFR est non-AA 19 (L) >60 ml/min/1.73m2    Calcium 8.2 (L) 8.5 - 10.1 mg/dL    Phosphorus 3.2 2.6 - 4.7 mg/dL    Albumin 1.6 (L) 3.5 - 5.0 g/dL   MAGNESIUM    Collection Time: 09/30/20  4:10 AM   Result Value Ref Range    Magnesium 2.4 1.6 - 2.4 mg/dL   PHOSPHORUS    Collection Time: 09/30/20  4:10 AM   Result Value Ref Range    Phosphorus 3.2 2.6 - 4.7 mg/dL   CBC WITH AUTOMATED DIFF    Collection Time: 09/30/20  4:10 AM   Result Value Ref Range    WBC 14.6 (H) 4.1 - 11.1 K/uL    RBC 2.25 (L) 4.10 - 5.70 M/uL    HGB 7.1 (L) 12.1 - 17.0 %    HCT 21.7 (L) 36.6 - 50.3 %    MCV 96.4 80.0 - 99.0 FL    MCH 31.6 26.0 - 34.0 PG    MCHC 32.7 30.0 - 36.5 g/dL    RDW 17.9 (H) 11.5 - 14.5 %    PLATELET 82 (L) 405 - 400 K/uL    MPV 13.7 (H) 8.9 - 12.9 FL    NEUTROPHILS 84 (H) 32 - 75 %    LYMPHOCYTES 5 (L) 12 - 49 %    MONOCYTES 10 5 - 13 %    EOSINOPHILS 0 0 - 7 %    BASOPHILS 0 0 - 1 %    IMMATURE GRANULOCYTES 1 (H) 0.0 - 0.5 %    ABS. NEUTROPHILS 12.4 (H) 1.8 - 8.0 K/UL    ABS. LYMPHOCYTES 0.7 (L) 0.8 - 3.5 K/UL    ABS. MONOCYTES 1.5 (H) 0.0 - 1.0 K/UL    ABS. EOSINOPHILS 0.0 0.0 - 0.4 K/UL    ABS. BASOPHILS 0.0 0.0 - 0.1 K/UL    ABS. IMM.  GRANS. 0.1 (H) 0.00 - 0.04 K/UL    DF AUTOMATED     BLOOD GAS, ARTERIAL    Collection Time: 09/30/20  4:15 AM   Result Value Ref Range    pH 7.46 (H) 7.35 - 7.45      PCO2 31 (L) 35 - 45 mmHg    PO2 90 75 - 100 mmHg    O2 SAT 98 >95 %    BICARBONATE 23 22 - 26 mmol/L    BASE DEFICIT 1.5 0 - 2 mmol/L    FIO2 25 %    Tidal volume 500      High PEEP 5      Sample source Arterial      SITE Right Radial      ESE'S TEST YES     TYPE & SCREEN    Collection Time: 09/30/20  7:45 AM   Result Value Ref Range    Crossmatch Expiration 10/03/2020,2359     ABO/Rh(D) A Positive     Antibody screen Negative     Comment JAKOB HX:  A POS    HGB & HCT    Collection Time: 09/30/20  9:09 AM   Result Value Ref Range    HGB 8.2 (L) 12.1 - 17.0 %    HCT 25.3 (L) 36.6 - 50.3 %   EKG, 12 LEAD, SUBSEQUENT    Collection Time: 09/30/20 11:01 AM   Result Value Ref Range    Ventricular Rate 88 BPM    Atrial Rate 88 BPM    P-R Interval 190 ms    QRS Duration 86 ms    Q-T Interval 396 ms    QTC Calculation (Bezet) 479 ms    Calculated P Axis 66 degrees    Calculated R Axis 23 degrees    Calculated T Axis -39 degrees    Diagnosis       Sinus rhythm with marked sinus arrhythmia  Nonspecific ST and T wave abnormality  Prolonged QT  Abnormal ECG  When compared with ECG of 25-SEP-2020 17:29,  No significant change was found  Confirmed by HOSP LAN, [address] [facility] ([id number]) on 9/30/2020 12:23:48 PM     GLUCOSE, POC    Collection Time: 09/30/20  9:33 PM   Result Value Ref Range    Glucose (POC) 172 (H) 65 - 100 mg/dL    Performed by Christopher Louise        Current Facility-Administered Medications:     docusate (COLACE) 50 mg/5 mL oral liquid 100 mg, 100 mg, Oral, BID, Mat Ortez MD, 100 mg at 09/30/20 2228    heparin (porcine) pf 300 Units, 300 Units, InterCATHeter, PRN, Rich Burks MD    bicarbonate dialysis (PRISMASOL) BG K 4/Ca 2.5 5000 ml solution, , Extracorporeal, DIALYSIS CONTINUOUS, Rich Burks MD, Last Rate: 800 mL/hr at 09/28/20 1608, 800 mL/hr at 09/28/20 1608    sodium chloride (NS) flush 5-40 mL, 5-40 mL, IntraVENous, PRN, Max White MD    oxyCODONE IR (ROXICODONE) tablet 5 mg, 5 mg, Oral, Q8H PRN, Max White MD    ferrous sulfate tablet 325 mg, 1 Tab, Oral, BID WITH MEALS, Max White MD, 325 mg at 09/30/20 1728    pantoprazole (PROTONIX) tablet 40 mg, 40 mg, Oral, ACB, Max White MD, Stopped at 09/30/20 0730    atorvastatin (LIPITOR) tablet 40 mg, 40 mg, Oral, QHS, Max White MD, 40 mg at 09/30/20 2228    mirtazapine (REMERON) tablet 15 mg, 15 mg, Oral, QHS, Max White MD, 15 mg at 09/30/20 2228    brimonidine (ALPHAGAN) 0.2 % ophthalmic solution 1 Drop, 1 Drop, Both Eyes, TID, Max White MD, 1 Drop at 09/30/20 2229    dorzolamide (TRUSOPT) 2 % ophthalmic solution 1 Drop, 1 Drop, Both Eyes, TID, Bolivar Gaona MD, 1 Drop at 09/30/20 2229    polyethylene glycol (MIRALAX) packet 17 g, 17 g, Oral, DAILY, Bolivar Gaona MD, 17 g at 09/30/20 0848    sevelamer carbonate (RENVELA) tab 2,400 mg, 2,400 mg, Oral, TID WITH MEALS, Bolivar Gaona MD, 2,400 mg at 09/30/20 1728    VANCOMYCIN INFORMATION NOTE 1 Each, 1 Each, Other, Rx Dosing/Monitoring, Bolivar Gaona MD    EPINEPHrine (ADRENALIN) 5 mg in 0.9% sodium chloride 250 mL infusion, 0-10 mcg/min, IntraVENous, TITRATE, Bolivar Gaona MD, Last Rate: 6 mL/hr at 09/26/20 0109, 2 mcg/min at 09/26/20 0109    DOPamine (INTROPIN) 400 mg in dextrose 5% 250 mL infusion, 0-50 mcg/kg/min, IntraVENous, TITRATE, Bolivar Gaona MD, Last Rate: 51.5 mL/hr at 09/30/20 1248, 20 mcg/kg/min at 09/30/20 1248    sodium chloride (NS) flush 5-40 mL, 5-40 mL, IntraVENous, PRN, Bolivar Gaona MD    acetaminophen (TYLENOL) tablet 650 mg, 650 mg, Oral, Q6H PRN **OR** acetaminophen (TYLENOL) suppository 650 mg, 650 mg, Rectal, Q6H PRN, Bolivar Gaona MD    polyethylene glycol (MIRALAX) packet 17 g, 17 g, Oral, DAILY PRN, Bolivar Gaona MD    promethazine (PHENERGAN) tablet 12.5 mg, 12.5 mg, Oral, Q6H PRN **OR** ondansetron (ZOFRAN) injection 4 mg, 4 mg, IntraVENous, Q6H PRN, Bolivar Gaona MD    potassium chloride 10 mEq in 100 ml IVPB, 20 mEq, IntraVENous, PRN, Bolivar Gaona MD    magnesium sulfate 2 g/50 ml IVPB (premix or compounded), 2 g, IntraVENous, PRN, Bolivar Gaona MD    albuterol-ipratropium (DUO-NEB) 2.5 MG-0.5 MG/3 ML, 3 mL, Nebulization, Q6H RT, Bolivar Gaona MD, 3 mL at 10/01/20 0033    NOREPINephrine (LEVOPHED) 8 mg in 5% dextrose 250mL (32 mcg/mL) infusion, 0.5-16 mcg/min, IntraVENous, TITRATE, Bolivar Gaona MD, Last Rate: 4.7 mL/hr at 09/28/20 1804, 2.5 mcg/min at 09/28/20 1804    heparin porcine injection 5,000 Units, 5,000 Units, SubCUTAneous, Roxy Zuleta MD, 5,000 Units at 09/30/20 2228    cefepime (MAXIPIME) 2 g in 0.9% sodium chloride (MBP/ADV) 100 mL MBP, 2 g, IntraVENous, Q24H, Gregg Spring MD, Last Rate: 200 mL/hr at 09/30/20 0854, 2 g at 09/30/20 0854    propofol (DIPRIVAN) 10 mg/mL infusion, 5 mcg/kg/min, IntraVENous, TITRATE, Gregg Spring MD, Last Rate: 2.1 mL/hr at 09/30/20 1248, 5 mcg/kg/min at 09/30/20 1248      Assessment and plan:      (1) Acute hypoxic respiratory failure : intubated/sedated, pulmonology following    (2) Cardiac arrest: likely aspiration event. On dopamine and bob rec inc/levophed    (3) Septic shock: on pressors, vancomycin and cefepime fu cs    (4) aspiration pneumonia : vancomycin and cefepime    (5) AECOPD: complicates     (5) ESRD on HD: off crrt from 9/29,nephrology following. Hd likely tomorrow    (6) Acute encephalopathy : likely hypoxia. Treating above first then tuen off sedation to assess. Poor prognosis.   Continue icu care  Was unable to reach family        Signed By: Gian Kearney MD     October 1, 2020

## 2020-10-01 NOTE — PROGRESS NOTES
Bedside shift change report given to [facility] [place]. Report included the following information SBAR and Cardiac Rhythm . [place]

## 2020-10-01 NOTE — DIALYSIS
Patient tolerated treatment. 1 kg of fluid was removed. Patient received 8,000 units of epogen during treatment. Patient was nonresponsive durign treatment.

## 2020-10-01 NOTE — PROGRESS NOTES
Renal Daily Progress Note    Admit Date: 9/25/2020  Hospital day 5    Subjective:     CRRT system clotted-discontinued on 9/29/20. He florinda on the ventilator and on propofol and 5 mcg/kg/min of dopamine.       Current Facility-Administered Medications   Medication Dose Route Frequency    epoetin giulia (EPOGEN;PROCRIT) injection 8,000 Units  8,000 Units IntraVENous DIALYSIS TUE, THU & SAT    vancomycin (VANCOCIN) 1,000 mg in 0.9% sodium chloride 250 mL (VIAL-MATE)  1,000 mg IntraVENous ONCE    [START ON 10/3/2020] Vancomycin Random Blood Level   Other ONCE    docusate (COLACE) 50 mg/5 mL oral liquid 100 mg  100 mg Oral BID    heparin (porcine) pf 300 Units  300 Units InterCATHeter PRN    bicarbonate dialysis (PRISMASOL) BG K 4/Ca 2.5 5000 ml solution   Extracorporeal DIALYSIS CONTINUOUS    sodium chloride (NS) flush 5-40 mL  5-40 mL IntraVENous PRN    oxyCODONE IR (ROXICODONE) tablet 5 mg  5 mg Oral Q8H PRN    ferrous sulfate tablet 325 mg  1 Tab Oral BID WITH MEALS    pantoprazole (PROTONIX) tablet 40 mg  40 mg Oral ACB    atorvastatin (LIPITOR) tablet 40 mg  40 mg Oral QHS    mirtazapine (REMERON) tablet 15 mg  15 mg Oral QHS    brimonidine (ALPHAGAN) 0.2 % ophthalmic solution 1 Drop  1 Drop Both Eyes TID    dorzolamide (TRUSOPT) 2 % ophthalmic solution 1 Drop  1 Drop Both Eyes TID    polyethylene glycol (MIRALAX) packet 17 g  17 g Oral DAILY    sevelamer carbonate (RENVELA) tab 2,400 mg  2,400 mg Oral TID WITH MEALS    VANCOMYCIN INFORMATION NOTE 1 Each  1 Each Other Rx Dosing/Monitoring    EPINEPHrine (ADRENALIN) 5 mg in 0.9% sodium chloride 250 mL infusion  0-10 mcg/min IntraVENous TITRATE    DOPamine (INTROPIN) 400 mg in dextrose 5% 250 mL infusion  0-50 mcg/kg/min IntraVENous TITRATE    sodium chloride (NS) flush 5-40 mL  5-40 mL IntraVENous PRN    acetaminophen (TYLENOL) tablet 650 mg  650 mg Oral Q6H PRN    Or    acetaminophen (TYLENOL) suppository 650 mg  650 mg Rectal Q6H PRN    polyethylene glycol (MIRALAX) packet 17 g  17 g Oral DAILY PRN    promethazine (PHENERGAN) tablet 12.5 mg  12.5 mg Oral Q6H PRN    Or    ondansetron (ZOFRAN) injection 4 mg  4 mg IntraVENous Q6H PRN    potassium chloride 10 mEq in 100 ml IVPB  20 mEq IntraVENous PRN    magnesium sulfate 2 g/50 ml IVPB (premix or compounded)  2 g IntraVENous PRN    albuterol-ipratropium (DUO-NEB) 2.5 MG-0.5 MG/3 ML  3 mL Nebulization Q6H RT    NOREPINephrine (LEVOPHED) 8 mg in 5% dextrose 250mL (32 mcg/mL) infusion  0.5-16 mcg/min IntraVENous TITRATE    heparin porcine injection 5,000 Units  5,000 Units SubCUTAneous Q8H    cefepime (MAXIPIME) 2 g in 0.9% sodium chloride (MBP/ADV) 100 mL MBP  2 g IntraVENous Q24H    propofol (DIPRIVAN) 10 mg/mL infusion  5 mcg/kg/min IntraVENous TITRATE        Review of Systems    Review of Systems   Neurological: Sensory change: 5. Not obtainable  Objective:     Patient Vitals for the past 8 hrs:   BP Temp Pulse Resp SpO2   10/01/20 1527 (!) 106/50 99.5 °F (37.5 °C) 84 15 100 %   10/01/20 1430 (!) 107/58 -- 86 18 100 %   10/01/20 1415 (!) 102/57 -- 86 19 100 %   10/01/20 1400 (!) 104/56 -- 84 18 100 %   10/01/20 1345 (!) 103/55 -- 83 16 100 %   10/01/20 1330 111/63 -- 86 19 100 %   10/01/20 1315 (!) 106/57 -- 85 18 100 %   10/01/20 1300 (!) 106/59 -- 82 17 100 %   10/01/20 1245 (!) 109/59 -- 80 17 100 %   10/01/20 1230 100/69 -- 82 23 100 %   10/01/20 1215 (!) 112/56 -- 76 15 100 %   10/01/20 1200 (!) 117/57 -- 75 16 100 %   10/01/20 1152 (!) 109/54 -- 75 17 100 %   10/01/20 1135 (!) 118/55 -- 75 19 100 %     10/01 0701 - 10/01 1900  In: -   Out: 1000   09/29 1901 - 10/01 0700  In: 2697.1 [I.V.:2372.1]  Out: 120 [Urine:120]    Physical Exam:   Physical Exam   Constitutional: He is intubated. Cardiovascular: Regular rhythm. Pulmonary/Chest: He is intubated. He has rhonchi in the right lower field and the left lower field. Abdominal: Soft. Bowel sounds are decreased. Musculoskeletal:         General: No edema. Right IJ tunneled dialysis catheter  Neuro: He is not withdrawing to noxious stimuli. He is on propofol however. Length of Stay 6       XR CHEST PORT   Final Result   Impression: Underexpanded lungs with bibasilar atelectasis. Possible   hydrostatic edema. XR CHEST PORT   Final Result   IMPRESSION:   1. No new acute process. 2.  Support devices as above. XR CHEST PORT   Final Result   Impression:      Satisfactory position of endotracheal tube. Suboptimal position of enteric tube,   may be advanced 5-10 cm for optimal placement      XR CHEST PORT   Final Result   Impression: Right mainstem bronchus intubation. Repositioning needed. Underexpanded lungs with bibasilar atelectasis. Findings conveyed to the patient's nurse Pool on 9/29/2020 at 7:56 AM.         XR CHEST PORT   Final Result   Impression:    1. Distal tracheal intubation as described. (Please note that anterior-posterior   radiography is a one dimensional exam and cannot accurately determine the exact   position of support apparatus. If there is need to better localize structures in   space, a lateral radiograph may prove helpful, as clinically indicated). 2. No developing pulmonary parenchymal or pleural lesion. XR CHEST PORT    (Results Pending)        Data Review   Recent Labs     09/30/20  0909 09/30/20 0410   WBC  --  14.6*   HGB 8.2* 7.1*   HCT 25.3* 21.7*   PLT  --  82*     Recent Labs     09/30/20  0410 09/29/20  0415   * 134*   K 3.8 4.2    103   CO2 22 24   * 178*   BUN 40* 31*   CREA 3.25* 2.43*   CA 8.2* 8.1*   MG 2.4 2.2   PHOS 3.2  3.2 3.2   ALB 1.6* 1.8*     No components found for: [place]  Recent Labs     10/01/20  0215 09/30/20  0415 09/29/20  0342   PH 7.49* 7.46* 7.49*   PCO2 28* 31* 30*   PO2 103* 90 141*   HCO3 23 23 25   FIO2 25 25 25     No results for input(s): INR, INREXT, INREXT, INREXT in the last 72 hours.       Assessment: Active Problems:    Cardiac arrest [facility]) (9/25/2020)    ESRD hemodialysis dependent  Respiratory failure on ventilator  Respiratory alkalosis  Thrombocytopenia  Leukocytosis with negative cultures  Modest hyponatremia  Anemia  Plan:   Off CRRT since 9/29/2020. Attempt 1 kg fluid removal on hemodialysis today.     Erythropoietin on dialysis

## 2020-10-01 NOTE — PROGRESS NOTES
Pulmonary ICU Progress Note    Subjective:   Daily Progress Note: 10/1/2020 10:09 AM    CC: intubated on ventilator    HPI:  25-year-old male transferred from [facility] as he was admitted there because of generalized weakness shortness of breath abdominal pain and fluid overload she was a renal failure getting hemodialysis. Patient subsequently coded cardiac arrest ACLS protocol was done he was found to be in PEA patient revived intubated on ventilator he has cardiac arrest at least 4 times now he is on vasopressors dopamine levo fed 7 also on vancomycin Maxipime so critical care consult was called for further evaluation   intubated on ventilator hemodynamically unstable on dopamine and levo fed and also getting (continuous renal replacement therapy) CRRT    Review of Systems  Unable to obtain    Objective:     Visit Vitals  BP (!) 115/57   Pulse 77   Temp 98.1 °F (36.7 °C)   Resp 12   Ht 5' 9.02\" (1.753 m)   Wt 72.3 kg (159 lb 6.3 oz)   SpO2 100%   BMI 23.53 kg/m²      O2 Device: Ventilator    Temp (24hrs), Av.6 °F (37 °C), Min:97.4 °F (36.3 °C), Max:99.3 °F (37.4 °C)      No intake/output data recorded.  1901 - 10/01 0700  In: 2697.1 [I.V.:2372.1]  Out: 120 [Urine:120]      General:   Sedated on ventilator   Head:  Normocephalic, without obvious abnormality, atraumatic. Nose: Nares normal. Septum midline. Mucosa normal. No drainage or sinus tenderness. Throat: Lips, mucosa, and tongue normal. Teeth and gums normal.   Neck: Supple, symmetrical, trachea midline, no adenopathy, thyroid: no enlargement/tenderness/nodules, no carotid bruit and no JVD. Back:   Symmetric, no curvature. ROM normal. No CVA tenderness. Lungs:    Bilateral rails   Chest wall:  No tenderness or deformity. Heart:  Regular rate and rhythm, S1, S2 normal, no murmur, click, rub or gallop. Abdomen:   Soft, non-tender. Bowel sounds normal. No masses,  No organomegaly. Extremities: Extremities normal, atraumatic, no cyanosis or edema. Pulses: 2+ and symmetric all extremities.            Neurologic:  Sedated with propofol           Data Review    Recent Results (from the past 24 hour(s))   HGB & HCT    Collection Time: 09/30/20  9:09 AM   Result Value Ref Range    HGB 8.2 (L) 12.1 - 17.0 %    HCT 25.3 (L) 36.6 - 50.3 %   EKG, 12 LEAD, SUBSEQUENT    Collection Time: 09/30/20 11:01 AM   Result Value Ref Range    Ventricular Rate 88 BPM    Atrial Rate 88 BPM    P-R Interval 190 ms    QRS Duration 86 ms    Q-T Interval 396 ms    QTC Calculation (Bezet) 479 ms    Calculated P Axis 66 degrees    Calculated R Axis 23 degrees    Calculated T Axis -39 degrees    Diagnosis       Sinus rhythm with marked sinus arrhythmia  Nonspecific ST and T wave abnormality  Prolonged QT  Abnormal ECG  When compared with ECG of 25-SEP-2020 17:29,  No significant change was found  Confirmed by HOSP LAN, [address] ([id number]) on 9/30/2020 12:23:48 PM     GLUCOSE, POC    Collection Time: 09/30/20  9:33 PM   Result Value Ref Range    Glucose (POC) 172 (H) 65 - 100 mg/dL    Performed by Fe Henry, RANDOM    Collection Time: 10/01/20  5:16 AM   Result Value Ref Range    Vancomycin, random 14.3 ug/mL    Reported dose date Not provided      Reported dose: Not provided Units       Current Facility-Administered Medications   Medication Dose Route Frequency    docusate (COLACE) 50 mg/5 mL oral liquid 100 mg  100 mg Oral BID    heparin (porcine) pf 300 Units  300 Units InterCATHeter PRN    bicarbonate dialysis (PRISMASOL) BG K 4/Ca 2.5 5000 ml solution   Extracorporeal DIALYSIS CONTINUOUS    sodium chloride (NS) flush 5-40 mL  5-40 mL IntraVENous PRN    oxyCODONE IR (ROXICODONE) tablet 5 mg  5 mg Oral Q8H PRN    ferrous sulfate tablet 325 mg  1 Tab Oral BID WITH MEALS    pantoprazole (PROTONIX) tablet 40 mg  40 mg Oral ACB    atorvastatin (LIPITOR) tablet 40 mg  40 mg Oral QHS    mirtazapine (REMERON) tablet 15 mg  15 mg Oral QHS    brimonidine (ALPHAGAN) 0.2 % ophthalmic solution 1 Drop  1 Drop Both Eyes TID    dorzolamide (TRUSOPT) 2 % ophthalmic solution 1 Drop  1 Drop Both Eyes TID    polyethylene glycol (MIRALAX) packet 17 g  17 g Oral DAILY    sevelamer carbonate (RENVELA) tab 2,400 mg  2,400 mg Oral TID WITH MEALS    VANCOMYCIN INFORMATION NOTE 1 Each  1 Each Other Rx Dosing/Monitoring    EPINEPHrine (ADRENALIN) 5 mg in 0.9% sodium chloride 250 mL infusion  0-10 mcg/min IntraVENous TITRATE    DOPamine (INTROPIN) 400 mg in dextrose 5% 250 mL infusion  0-50 mcg/kg/min IntraVENous TITRATE    sodium chloride (NS) flush 5-40 mL  5-40 mL IntraVENous PRN    acetaminophen (TYLENOL) tablet 650 mg  650 mg Oral Q6H PRN    Or    acetaminophen (TYLENOL) suppository 650 mg  650 mg Rectal Q6H PRN    polyethylene glycol (MIRALAX) packet 17 g  17 g Oral DAILY PRN    promethazine (PHENERGAN) tablet 12.5 mg  12.5 mg Oral Q6H PRN    Or    ondansetron (ZOFRAN) injection 4 mg  4 mg IntraVENous Q6H PRN    potassium chloride 10 mEq in 100 ml IVPB  20 mEq IntraVENous PRN    magnesium sulfate 2 g/50 ml IVPB (premix or compounded)  2 g IntraVENous PRN    albuterol-ipratropium (DUO-NEB) 2.5 MG-0.5 MG/3 ML  3 mL Nebulization Q6H RT    NOREPINephrine (LEVOPHED) 8 mg in 5% dextrose 250mL (32 mcg/mL) infusion  0.5-16 mcg/min IntraVENous TITRATE    heparin porcine injection 5,000 Units  5,000 Units SubCUTAneous Q8H    cefepime (MAXIPIME) 2 g in 0.9% sodium chloride (MBP/ADV) 100 mL MBP  2 g IntraVENous Q24H    propofol (DIPRIVAN) 10 mg/mL infusion  5 mcg/kg/min IntraVENous TITRATE         Assessment/Plan:     1. Acute hypoxic respiratory failure  2. Cardiac arrest CODE BLUE x4  3. Aspiration pneumonia  4. Cardiogenic status post code versus septic shock  5. Right mainstem intubation  6. End-stage renal disease on hemodialysis  7.  Chronic Obstructive Pulmonary Disease with Severe Acute Exacerbation requiring inpatient hospitalization and management; has very poor airway clearance. Increased work of breathing  8. Body mass index is 22.88 kg/m². 9. Multiorgan dysfunction as outlined above: Pt has one or more acute or chronic illnesses with severe exacerbation with progression or side effects of treatment that poses a threat to life or bodily function  10. Additional workup outlined below  11. Pt is requiring Drug therapy requiring intensive monitoring for toxicity  12. Pt is unstable, unpredictable needing inpatient monitoring; is acutely ill and at high risk of sudden decline and decompensation with severe consequenses and continued end organ dysfunction and failure  13. Prognosis guarded        RECOMMENDATIONS/PLAN:      1. Patient is intubated on ventilator arterial blood gases shows pH 7.48 PCO2 27.5 PO2 103 corrected we will continue the current vent settings will discontinue propofol and start weaning when more awake and hemodynamically stable he is still on dopamine  2. Will withdraw ET tube 2 cm repeat chest x-ray  3. Patient on dopamine and also getting CRRT Levophed has been weaned off  4. Aspiration pneumonia sputum came positive for gram-positive cocci in chain and also in clusters and also has some yeast most likely contamination or colonization  5. Ventilator settings of assist-control rate of 12 tidal volume 500 PEEP of 5 FiO2 30% we will decrease the rate to 8  6. Agree with Empiric IV antibiotics vancomycin and Maxipime pending culture results   7. Follow culture results abdominal CAT scan was negative  8. Hemodynamically unstable we will continue to try to wean him again today       Total time spent with patient: 35 minutes.

## 2020-10-02 NOTE — PROGRESS NOTES
Progress Note    Patient: Ankush Huitron MRN: [id number]  SSN: [id number]    YOB: 1950  Age: 78 y.o. Sex: male      Admit Date: 9/25/2020    LOS: 7 days     Subjective:     69M, H/o ESRD on HD and HLD and HTN with cardiac arrest x 4, multiorgan dysfunction and acute hypoxic respiratory failure. He presented here from [facility] after cardiac arrest s/t possible aspiration pneumonia. Remains intubated, on propofol and 5 mcg/kg/min of dopamine, inc 2/2 bob.        Remains intubated  Off sedation since yesterday, minimally responsive  . 6(vanco/cefepime/presumptive aspiration)  HGB to 6.4- to transfuse 2 units with hd  Remains on dopamine 5mcg/kg/min        Review of Systems:  Unable to determine s/t intubated/sedate      Objective:   Patient Vitals for the past 8 hrs:    BP Pulse Resp SpO2   09/30/20 1221 (!) 104/55 77 23 100 %   09/30/20 1127 (!) 110/54 -- 15 100 %   09/30/20 1025 121/73 -- 15 100 %   09/30/20 0900 (!) 107/52 -- 15 100 %   09/30/20 0800 (!) 106/47 -- 15 100 %         Physical Exam:   General : Appearance:  Intubated and sedate  HEENT : PERRLA,ETT/ngt  Neck : no JVD, no masses noted  Lungs : Intubated, No wheezing, creps bases  CVS : Rhythm rate regular, S1+, S2+, no murmur or gallop  Abdomen : Soft, nontender, no organomegaly, bowel sounds active  Extremities : No edema noted,  pedal pulses palpable, LT fem cvc  Musculoskeletal : flaccid extremities  Skin : Moist, warm, skin turgor, no pathological rash  Lymphatic : No cervical lymphadenopathy.   Neurological :intubated, responsive to deep pain  Psychiatric : cannot be assessed, calm      Lab/Data Review:  Recent Results (from the past 24 hour(s))   BLOOD GAS, ARTERIAL    Collection Time: 10/02/20  2:20 AM   Result Value Ref Range    pH 7.49 (H) 7.35 - 7.45      PCO2 30 (L) 35 - 45 mmHg    PO2 103 (H) 75 - 100 mmHg    O2 SAT 99 >95 %    BICARBONATE 24 22 - 26 mmol/L    BASE DEFICIT 0.1 0 - 2 mmol/L    O2 METHOD VENT FIO2 25 %    MODE Assist Control/Volume Control      Tidal volume 500      SET RATE 8      EPAP/CPAP/PEEP 5      SITE Left Brachial      ESE'S TEST Not Performed     METABOLIC PANEL, BASIC    Collection Time: 10/02/20  4:10 AM   Result Value Ref Range    Sodium 131 (L) 136 - 145 mmol/L    Potassium 4.4 3.5 - 5.1 mmol/L    Chloride 100 97 - 108 mmol/L    CO2 23 21 - 32 mmol/L    Anion gap 8 5 - 15 mmol/L    Glucose 229 (H) 65 - 100 mg/dL    BUN 44 (H) 6 - 20 mg/dL    Creatinine 4.11 (H) 0.70 - 1.30 mg/dL    BUN/Creatinine ratio 11 (L) 12 - 20      GFR est AA 18 (L) >60 ml/min/1.73m2    GFR est non-AA 14 (L) >60 ml/min/1.73m2    Calcium 8.0 (L) 8.5 - 10.1 mg/dL   CBC WITH AUTOMATED DIFF    Collection Time: 10/02/20  4:10 AM   Result Value Ref Range    WBC 17.7 (H) 4.1 - 11.1 K/uL    RBC 2.09 (L) 4.10 - 5.70 M/uL    HGB 6.4 (L) 12.1 - 17.0 g/dL    HCT 19.6 (L) 36.6 - 50.3 %    MCV 93.8 80.0 - 99.0 FL    MCH 30.6 26.0 - 34.0 PG    MCHC 32.7 30.0 - 36.5 g/dL    RDW 17.9 (H) 11.5 - 14.5 %    PLATELET 83 (L) 765 - 400 K/uL    NEUTROPHILS 88 (H) 32 - 75 %    LYMPHOCYTES 3 (L) 12 - 49 %    MONOCYTES 8 5 - 13 %    EOSINOPHILS 0 0 - 7 %    BASOPHILS 0 0 - 1 %    IMMATURE GRANULOCYTES 1 (H) 0.0 - 0.5 %    ABS. NEUTROPHILS 15.7 (H) 1.8 - 8.0 K/UL    ABS. LYMPHOCYTES 0.5 (L) 0.8 - 3.5 K/UL    ABS. MONOCYTES 1.5 (H) 0.0 - 1.0 K/UL    ABS. EOSINOPHILS 0.0 0.0 - 0.4 K/UL    ABS. BASOPHILS 0.0 0.0 - 0.1 K/UL    ABS. IMM.  GRANS. 0.1 (H) 0.00 - 0.04 K/UL    DF AUTOMATED         Current Facility-Administered Medications:     ferrous sulfate 300 mg (60 mg iron)/5 mL oral syrup 300 mg, 300 mg, Per G Tube, BID WITH MEALS, Franco Cisneros MD    pantoprazole (PROTONIX) granules for oral suspension 40 mg, 40 mg, Per G Tube, ACB, Franco Cisneros MD    sevelamer carbonate (RENVELA) oral powder 2.4 g, 2.4 g, Per G Tube, TID WITH MEALS, Franco Cisneros MD    epoetin giulia (EPOGEN;PROCRIT) injection 8,000 Units, 8,000 Units, IntraVENous, DIALYSIS TUE, THU & SAT, Rich Burks MD, 8,000 Units at 10/01/20 1400    [START ON 10/3/2020] Vancomycin Random Blood Level, , Other, ONCE, Franco Tovar MD    docusate (COLACE) 50 mg/5 mL oral liquid 100 mg, 100 mg, Oral, BID, Kaur Sethi MD, 100 mg at 10/02/20 0858    heparin (porcine) pf 300 Units, 300 Units, InterCATHeter, PRN, Rich Burks MD    bicarbonate dialysis (PRISMASOL) BG K 4/Ca 2.5 5000 ml solution, , Extracorporeal, DIALYSIS CONTINUOUS, Rich Burks MD, Last Rate: 800 mL/hr at 09/28/20 1608, 800 mL/hr at 09/28/20 1608    sodium chloride (NS) flush 5-40 mL, 5-40 mL, IntraVENous, PRN, Jaqui Galan MD    oxyCODONE IR (ROXICODONE) tablet 5 mg, 5 mg, Oral, Q8H PRN, Jaqui Galan MD    atorvastatin (LIPITOR) tablet 40 mg, 40 mg, Oral, QHS, Jaqui Galan MD, 40 mg at 10/01/20 2315    mirtazapine (REMERON) tablet 15 mg, 15 mg, Oral, QHS, Jaqui Galan MD, 15 mg at 10/01/20 2315    brimonidine (ALPHAGAN) 0.2 % ophthalmic solution 1 Drop, 1 Drop, Both Eyes, TID, Jaqui Galan MD, 1 Drop at 10/02/20 0901    dorzolamide (TRUSOPT) 2 % ophthalmic solution 1 Drop, 1 Drop, Both Eyes, TID, Jaqui Galan MD, 1 Drop at 10/02/20 0901    polyethylene glycol (MIRALAX) packet 17 g, 17 g, Oral, DAILY, Jaqui Galan MD, 17 g at 10/02/20 0858    VANCOMYCIN INFORMATION NOTE 1 Each, 1 Each, Other, Rx Dosing/Monitoring, Jaqui Galan MD    EPINEPHrine (ADRENALIN) 5 mg in 0.9% sodium chloride 250 mL infusion, 0-10 mcg/min, IntraVENous, TITRATE, Jaqui Galan MD, Last Rate: 6 mL/hr at 09/26/20 0109, 2 mcg/min at 09/26/20 0109    DOPamine (INTROPIN) 400 mg in dextrose 5% 250 mL infusion, 0-50 mcg/kg/min, IntraVENous, TITRATE, Jaqui Galan MD, Last Rate: 12.9 mL/hr at 10/02/20 0857, 5 mcg/kg/min at 10/02/20 0857    sodium chloride (NS) flush 5-40 mL, 5-40 mL, IntraVENous, PRN, Jaqui Galan MD    acetaminophen (TYLENOL) tablet 650 mg, 650 mg, Oral, Q6H PRN **OR** acetaminophen (TYLENOL) suppository 650 mg, 650 mg, Rectal, Q6H PRN, Lindsey Moeller MD    polyethylene glycol (MIRALAX) packet 17 g, 17 g, Oral, DAILY PRN, Lindsey Moeller MD    promethazine (PHENERGAN) tablet 12.5 mg, 12.5 mg, Oral, Q6H PRN **OR** ondansetron (ZOFRAN) injection 4 mg, 4 mg, IntraVENous, Q6H PRN, Lindsey Moeller MD    potassium chloride 10 mEq in 100 ml IVPB, 20 mEq, IntraVENous, PRN, Lindsey Moeller MD, Last Rate: 200 mL/hr at 10/02/20 0905, 20 mEq at 10/02/20 0905    magnesium sulfate 2 g/50 ml IVPB (premix or compounded), 2 g, IntraVENous, PRN, Lindsey Moeller MD, Last Rate: 50 mL/hr at 10/01/20 0921, 2 g at 10/01/20 0921    albuterol-ipratropium (DUO-NEB) 2.5 MG-0.5 MG/3 ML, 3 mL, Nebulization, Q6H RT, Lindsey Moeller MD, 3 mL at 10/02/20 0816    NOREPINephrine (LEVOPHED) 8 mg in 5% dextrose 250mL (32 mcg/mL) infusion, 0.5-16 mcg/min, IntraVENous, TITRATE, Lindsey Moeller MD, Last Rate: 4.7 mL/hr at 09/28/20 1804, 2.5 mcg/min at 09/28/20 1804    heparin porcine injection 5,000 Units, 5,000 Units, SubCUTAneous, Q8H, Lindsey Moeller MD, 5,000 Units at 10/02/20 0858    cefepime (MAXIPIME) 2 g in 0.9% sodium chloride (MBP/ADV) 100 mL MBP, 2 g, IntraVENous, Q24H, Lindsey Moeller MD, Last Rate: 200 mL/hr at 10/01/20 0923, 2 g at 10/01/20 2591    propofol (DIPRIVAN) 10 mg/mL infusion, 5 mcg/kg/min, IntraVENous, TITRATE, Lindsey Moeller MD, Stopped at 10/01/20 0800      Assessment and plan:      --Acute hypoxic respiratory failure : intubated/sedated, pulmonology following, weaning trials continue    --Cardiac arrest x 4 + multiorgan dysfunction: likely aspiration event. On dopamine 2/2 bob    --Septic shock: on pressors, vancomycin and cefepime fu cs    --aspiration pneumonia : vancomycin and cefepime    --AECOPD: complicates     --ESRD on HD: off crrt from 9/29,nephrology following. --Acute encephalopathy : likely hypoxia.  Treating above first then tuen off sedation to assess. --Anemia: multifactorial/fe def, aoci. Transfuse 1 units prbc. Hd not sched until tomorrow, d/w HD. fobt + ferrokinetics    Poor prognosis.   Continue icu care          Signed By: Sujatha Nguyen MD     October 2, 2020

## 2020-10-02 NOTE — PROGRESS NOTES
Renal Daily Progress Note    Admit Date: 9/25/2020  Hospital day 5    Subjective:     Patient intubated and sedated; remained VSS; continued on dopamine and 2 pressors.  Underwent HD yesterday with 1 liter fluid removal. Labs stable today    Current Facility-Administered Medications   Medication Dose Route Frequency    ferrous sulfate 300 mg (60 mg iron)/5 mL oral syrup 300 mg  300 mg Per G Tube BID WITH MEALS    pantoprazole (PROTONIX) granules for oral suspension 40 mg  40 mg Per G Tube ACB    sevelamer carbonate (RENVELA) oral powder 2.4 g  2.4 g Per G Tube TID WITH MEALS    0.9% sodium chloride infusion 250 mL  250 mL IntraVENous PRN    epoetin giulia (EPOGEN;PROCRIT) injection 8,000 Units  8,000 Units IntraVENous DIALYSIS TUE, THU & SAT    [START ON 10/3/2020] Vancomycin Random Blood Level   Other ONCE    docusate (COLACE) 50 mg/5 mL oral liquid 100 mg  100 mg Oral BID    heparin (porcine) pf 300 Units  300 Units InterCATHeter PRN    bicarbonate dialysis (PRISMASOL) BG K 4/Ca 2.5 5000 ml solution   Extracorporeal DIALYSIS CONTINUOUS    sodium chloride (NS) flush 5-40 mL  5-40 mL IntraVENous PRN    oxyCODONE IR (ROXICODONE) tablet 5 mg  5 mg Oral Q8H PRN    atorvastatin (LIPITOR) tablet 40 mg  40 mg Oral QHS    mirtazapine (REMERON) tablet 15 mg  15 mg Oral QHS    brimonidine (ALPHAGAN) 0.2 % ophthalmic solution 1 Drop  1 Drop Both Eyes TID    dorzolamide (TRUSOPT) 2 % ophthalmic solution 1 Drop  1 Drop Both Eyes TID    polyethylene glycol (MIRALAX) packet 17 g  17 g Oral DAILY    VANCOMYCIN INFORMATION NOTE 1 Each  1 Each Other Rx Dosing/Monitoring    EPINEPHrine (ADRENALIN) 5 mg in 0.9% sodium chloride 250 mL infusion  0-10 mcg/min IntraVENous TITRATE    DOPamine (INTROPIN) 400 mg in dextrose 5% 250 mL infusion  0-50 mcg/kg/min IntraVENous TITRATE    sodium chloride (NS) flush 5-40 mL  5-40 mL IntraVENous PRN    acetaminophen (TYLENOL) tablet 650 mg  650 mg Oral Q6H PRN    Or    acetaminophen (TYLENOL) suppository 650 mg  650 mg Rectal Q6H PRN    polyethylene glycol (MIRALAX) packet 17 g  17 g Oral DAILY PRN    promethazine (PHENERGAN) tablet 12.5 mg  12.5 mg Oral Q6H PRN    Or    ondansetron (ZOFRAN) injection 4 mg  4 mg IntraVENous Q6H PRN    potassium chloride 10 mEq in 100 ml IVPB  20 mEq IntraVENous PRN    magnesium sulfate 2 g/50 ml IVPB (premix or compounded)  2 g IntraVENous PRN    albuterol-ipratropium (DUO-NEB) 2.5 MG-0.5 MG/3 ML  3 mL Nebulization Q6H RT    NOREPINephrine (LEVOPHED) 8 mg in 5% dextrose 250mL (32 mcg/mL) infusion  0.5-16 mcg/min IntraVENous TITRATE    heparin porcine injection 5,000 Units  5,000 Units SubCUTAneous Q8H    cefepime (MAXIPIME) 2 g in 0.9% sodium chloride (MBP/ADV) 100 mL MBP  2 g IntraVENous Q24H    propofol (DIPRIVAN) 10 mg/mL infusion  5 mcg/kg/min IntraVENous TITRATE        Review of Systems    Review of Systems   Neurological: Sensory change: 5. Not obtainable  Objective:     Patient Vitals for the past 8 hrs:   BP Temp Pulse Resp SpO2   10/02/20 1454 (!) (P) 105/51 (!) (P) 101 °F (38.3 °C) (P) 68 (P) 28 (P) 100 %   10/02/20 1421 -- -- -- -- 100 %   10/02/20 1419 (!) (P) 100/59 (!) (P) 100.6 °F (38.1 °C) (P) 66 (P) 20 (P) 100 %   10/02/20 1402 127/63 (!) 100.6 °F (38.1 °C) 62 20 100 %   10/02/20 1300 (!) 100/43 -- 62 21 100 %   10/02/20 1200 (!) 105/59 -- -- 20 100 %   10/02/20 1141 -- -- 62 21 100 %   10/02/20 1100 (!) 110/47 99.7 °F (37.6 °C) -- 18 100 %   10/02/20 1000 (!) 102/45 -- -- 18 100 %   10/02/20 0900 (!) 90/50 -- -- 18 100 %   10/02/20 0820 -- -- 66 19 100 %   10/02/20 0817 (!) 87/50 -- -- 15 100 %     10/02 0701 - 10/02 1900  In: 175   Out: -   09/30 1901 - 10/02 0700  In: 2058.3 [I.V.:1823.3]  Out: 1120 [Urine:120]    Physical Exam:   Physical Exam   Constitutional: He is intubated. Cardiovascular: Regular rhythm. Pulmonary/Chest: He is intubated.  He has rhonchi in the right lower field and the left lower field.   Abdominal: Soft. Bowel sounds are decreased. Musculoskeletal:         General: No edema. Right IJ tunneled dialysis catheter  Neuro: He is not withdrawing to noxious stimuli. He is on propofol however. Length of Stay 7       XR CHEST PORT   Final Result      XR CHEST PORT   Final Result   Impression: Underexpanded lungs with bibasilar atelectasis. Possible   hydrostatic edema. XR CHEST PORT   Final Result   IMPRESSION:   1. No new acute process. 2.  Support devices as above. XR CHEST PORT   Final Result   Impression:      Satisfactory position of endotracheal tube. Suboptimal position of enteric tube,   may be advanced 5-10 cm for optimal placement      XR CHEST PORT   Final Result   Impression: Right mainstem bronchus intubation. Repositioning needed. Underexpanded lungs with bibasilar atelectasis. Findings conveyed to the patient's nurse Jorge Miranda on 9/29/2020 at 7:56 AM.         XR CHEST PORT   Final Result   Impression:    1. Distal tracheal intubation as described. (Please note that anterior-posterior   radiography is a one dimensional exam and cannot accurately determine the exact   position of support apparatus. If there is need to better localize structures in   space, a lateral radiograph may prove helpful, as clinically indicated). 2. No developing pulmonary parenchymal or pleural lesion.       XR CHEST PORT    (Results Pending)        Data Review   Recent Labs     10/02/20  0410 09/30/20  0909 09/30/20 0410   WBC 17.7*  --  14.6*   HGB 6.4* 8.2* 7.1*   HCT 19.6* 25.3* 21.7*   PLT 83*  --  82*     Recent Labs     10/02/20  0410 09/30/20  0410   * 135*   K 4.4 3.8    105   CO2 23 22   * 163*   BUN 44* 40*   CREA 4.11* 3.25*   CA 8.0* 8.2*   MG  --  2.4   PHOS  --  3.2  3.2   ALB  --  1.6*     No components found for: [place]  Recent Labs     10/02/20  0220 10/01/20  0215 09/30/20  0415   PH 7.49* 7.49* 7.46*   PCO2 30* 28* 31*   PO2 103* 103* 90 HCO3 24 23 23   FIO2 25 25 25     No results for input(s): INR, INREXT, INREXT, INREXT in the last 72 hours. ASSESSMENT AND PLAN     ESRD hemodialysis dependent  Respiratory failure on ventilator  Respiratory alkalosis  Thrombocytopenia  Leukocytosis with negative cultures  Modest hyponatremia  Anemia      Plan:   HD planned for 10/3, with attempted 2.5 liter fluid removal  Continued pressor support if required for MAP >65 during dialysis as well  Patient has improving hemodynamics. Continues to remain off CRRT and was able to be dialyzed on 10/1.  To monitor vitals on HD on 10/3

## 2020-10-02 NOTE — PROGRESS NOTES
Pulmonary ICU Progress Note    Subjective:   Daily Progress Note: 10/2/2020 10:09 AM    CC: intubated on ventilator    HPI:  79-year-old male transferred from [facility] as he was admitted there because of generalized weakness shortness of breath abdominal pain and fluid overload she was a renal failure getting hemodialysis. Patient subsequently coded cardiac arrest ACLS protocol was done he was found to be in PEA patient revived intubated on ventilator he has cardiac arrest at least 4 times now he is on vasopressors dopamine levo fed 7 also on vancomycin Maxipime so critical care consult was called for further evaluation   intubated on ventilator hemodynamically unstable on dopamine and levo fed and also getting (continuous renal replacement therapy) CRRT  10/2 remain intubated on ventilator off sedation  Review of Systems  Unable to obtain    Objective:     Visit Vitals  BP (!) 87/50 (BP Patient Position: At rest)   Pulse 66   Temp 99.9 °F (37.7 °C)   Resp 19   Ht 5' 9.02\" (1.753 m)   Wt 72.3 kg (159 lb 6.3 oz)   SpO2 100%   BMI 23.53 kg/m²      O2 Device: Ventilator    Temp (24hrs), Av.1 °F (37.8 °C), Min:99.5 °F (37.5 °C), Max:100.6 °F (38.1 °C)      No intake/output data recorded.  1901 - 10/02 0700  In: 2058.3 [I.V.:1823.3]  Out: 1120 [Urine:120]      General:   Open eyes grimace with sternal rub   Head:  Normocephalic, without obvious abnormality, atraumatic. Nose: Nares normal. Septum midline. Mucosa normal. No drainage or sinus tenderness. Throat: Lips, mucosa, and tongue normal. Teeth and gums normal.   Neck: Supple, symmetrical, trachea midline, no adenopathy, thyroid: no enlargement/tenderness/nodules, no carotid bruit and no JVD. Back:   Symmetric, no curvature. ROM normal. No CVA tenderness. Lungs:    Bilateral rails   Chest wall:  No tenderness or deformity.    Heart:  Regular rate and rhythm, S1, S2 normal, no murmur, click, rub or gallop. Abdomen:   Soft, non-tender. Bowel sounds normal. No masses,  No organomegaly. Extremities: Extremities normal, atraumatic, no cyanosis or edema. Pulses: 2+ and symmetric all extremities. Neurologic:  Off sedation very minimal response           Data Review    Recent Results (from the past 24 hour(s))   BLOOD GAS, ARTERIAL    Collection Time: 10/02/20  2:20 AM   Result Value Ref Range    pH 7.49 (H) 7.35 - 7.45      PCO2 30 (L) 35 - 45 mmHg    PO2 103 (H) 75 - 100 mmHg    O2 SAT 99 >95 %    BICARBONATE 24 22 - 26 mmol/L    BASE DEFICIT 0.1 0 - 2 mmol/L    O2 METHOD VENT      FIO2 25 %    MODE Assist Control/Volume Control      Tidal volume 500      SET RATE 8      EPAP/CPAP/PEEP 5      SITE Left Brachial      ESE'S TEST Not Performed     METABOLIC PANEL, BASIC    Collection Time: 10/02/20  4:10 AM   Result Value Ref Range    Sodium 131 (L) 136 - 145 mmol/L    Potassium 4.4 3.5 - 5.1 mmol/L    Chloride 100 97 - 108 mmol/L    CO2 23 21 - 32 mmol/L    Anion gap 8 5 - 15 mmol/L    Glucose 229 (H) 65 - 100 mg/dL    BUN 44 (H) 6 - 20 mg/dL    Creatinine 4.11 (H) 0.70 - 1.30 mg/dL    BUN/Creatinine ratio 11 (L) 12 - 20      GFR est AA 18 (L) >60 ml/min/1.73m2    GFR est non-AA 14 (L) >60 ml/min/1.73m2    Calcium 8.0 (L) 8.5 - 10.1 mg/dL   CBC WITH AUTOMATED DIFF    Collection Time: 10/02/20  4:10 AM   Result Value Ref Range    WBC 17.7 (H) 4.1 - 11.1 K/uL    RBC 2.09 (L) 4.10 - 5.70 M/uL    HGB 6.4 (L) 12.1 - 17.0 g/dL    HCT 19.6 (L) 36.6 - 50.3 %    MCV 93.8 80.0 - 99.0 FL    MCH 30.6 26.0 - 34.0 PG    MCHC 32.7 30.0 - 36.5 g/dL    RDW 17.9 (H) 11.5 - 14.5 %    PLATELET 83 (L) 873 - 400 K/uL    NEUTROPHILS 88 (H) 32 - 75 %    LYMPHOCYTES 3 (L) 12 - 49 %    MONOCYTES 8 5 - 13 %    EOSINOPHILS 0 0 - 7 %    BASOPHILS 0 0 - 1 %    IMMATURE GRANULOCYTES 1 (H) 0.0 - 0.5 %    ABS. NEUTROPHILS 15.7 (H) 1.8 - 8.0 K/UL    ABS. LYMPHOCYTES 0.5 (L) 0.8 - 3.5 K/UL    ABS.  MONOCYTES 1.5 (H) 0.0 - 1.0 K/UL    ABS. EOSINOPHILS 0.0 0.0 - 0.4 K/UL    ABS. BASOPHILS 0.0 0.0 - 0.1 K/UL    ABS. IMM.  GRANS. 0.1 (H) 0.00 - 0.04 K/UL    DF AUTOMATED         Current Facility-Administered Medications   Medication Dose Route Frequency    epoetin giulia (EPOGEN;PROCRIT) injection 8,000 Units  8,000 Units IntraVENous DIALYSIS TUE, THU & SAT    [START ON 10/3/2020] Vancomycin Random Blood Level   Other ONCE    docusate (COLACE) 50 mg/5 mL oral liquid 100 mg  100 mg Oral BID    heparin (porcine) pf 300 Units  300 Units InterCATHeter PRN    bicarbonate dialysis (PRISMASOL) BG K 4/Ca 2.5 5000 ml solution   Extracorporeal DIALYSIS CONTINUOUS    sodium chloride (NS) flush 5-40 mL  5-40 mL IntraVENous PRN    oxyCODONE IR (ROXICODONE) tablet 5 mg  5 mg Oral Q8H PRN    ferrous sulfate tablet 325 mg  1 Tab Oral BID WITH MEALS    pantoprazole (PROTONIX) tablet 40 mg  40 mg Oral ACB    atorvastatin (LIPITOR) tablet 40 mg  40 mg Oral QHS    mirtazapine (REMERON) tablet 15 mg  15 mg Oral QHS    brimonidine (ALPHAGAN) 0.2 % ophthalmic solution 1 Drop  1 Drop Both Eyes TID    dorzolamide (TRUSOPT) 2 % ophthalmic solution 1 Drop  1 Drop Both Eyes TID    polyethylene glycol (MIRALAX) packet 17 g  17 g Oral DAILY    sevelamer carbonate (RENVELA) tab 2,400 mg  2,400 mg Oral TID WITH MEALS    VANCOMYCIN INFORMATION NOTE 1 Each  1 Each Other Rx Dosing/Monitoring    EPINEPHrine (ADRENALIN) 5 mg in 0.9% sodium chloride 250 mL infusion  0-10 mcg/min IntraVENous TITRATE    DOPamine (INTROPIN) 400 mg in dextrose 5% 250 mL infusion  0-50 mcg/kg/min IntraVENous TITRATE    sodium chloride (NS) flush 5-40 mL  5-40 mL IntraVENous PRN    acetaminophen (TYLENOL) tablet 650 mg  650 mg Oral Q6H PRN    Or    acetaminophen (TYLENOL) suppository 650 mg  650 mg Rectal Q6H PRN    polyethylene glycol (MIRALAX) packet 17 g  17 g Oral DAILY PRN    promethazine (PHENERGAN) tablet 12.5 mg  12.5 mg Oral Q6H PRN    Or    ondansetron (ZOFRAN) injection 4 mg  4 mg IntraVENous Q6H PRN    potassium chloride 10 mEq in 100 ml IVPB  20 mEq IntraVENous PRN    magnesium sulfate 2 g/50 ml IVPB (premix or compounded)  2 g IntraVENous PRN    albuterol-ipratropium (DUO-NEB) 2.5 MG-0.5 MG/3 ML  3 mL Nebulization Q6H RT    NOREPINephrine (LEVOPHED) 8 mg in 5% dextrose 250mL (32 mcg/mL) infusion  0.5-16 mcg/min IntraVENous TITRATE    heparin porcine injection 5,000 Units  5,000 Units SubCUTAneous Q8H    cefepime (MAXIPIME) 2 g in 0.9% sodium chloride (MBP/ADV) 100 mL MBP  2 g IntraVENous Q24H    propofol (DIPRIVAN) 10 mg/mL infusion  5 mcg/kg/min IntraVENous TITRATE         Assessment/Plan:     1. Acute hypoxic respiratory failure  2. Cardiac arrest CODE BLUE x4  3. Aspiration pneumonia  4. Cardiogenic status post code versus septic shock  5. End-stage renal disease on hemodialysis  6. Chronic Obstructive Pulmonary Disease with Severe Acute Exacerbation requiring inpatient hospitalization and management; has very poor airway clearance. Increased work of breathing  7. Body mass index is 22.88 kg/m². 8. Multiorgan dysfunction as outlined above: Pt has one or more acute or chronic illnesses with severe exacerbation with progression or side effects of treatment that poses a threat to life or bodily function  9. Additional workup outlined below  10. Pt is requiring Drug therapy requiring intensive monitoring for toxicity  11. Pt is unstable, unpredictable needing inpatient monitoring; is acutely ill and at high risk of sudden decline and decompensation with severe consequenses and continued end organ dysfunction and failure  12. Prognosis guarded        RECOMMENDATIONS/PLAN:      1. Patient is intubated on ventilator arterial blood gases shows pH 7.49 PCO2 30 PO2 103 corrected change vent setting to SIMV  2. Patient is off sedation since yesterday still has minimal response  3. Patient on dopamine and he recieved CRRT andLevophed has been weaned off  4.  Aspiration pneumonia sputum came positive for gram-positive cocci in chain and also in clusters and also has some yeast most likely contamination or colonization  5. Ventilator settings of assist-control rate of 12 tidal volume 500 PEEP of 5 FiO2 30% we will decrease the rate to 8  6. Agree with Empiric IV antibiotics vancomycin and Maxipime pending culture results   7. Follow culture results abdominal CAT scan was negative  8. Hemodynamically unstable we will continue to try to wean him again today change vent setting to SIMV and spontaneous if patient tolerates       Total time spent with patient: 35 minutes.

## 2020-10-03 NOTE — PROGRESS NOTES
Renal Daily Progress Note    Admit Date: 9/25/2020      Subjective:   Pt is seen on HD   Tolerating RX   Not responding   Looks dry   Current Facility-Administered Medications   Medication Dose Route Frequency    vancomycin (VANCOCIN) 1,000 mg in 0.9% sodium chloride 250 mL (VIAL-MATE)  1,000 mg IntraVENous ONCE    [START ON 10/6/2020] VANCOMYCIN RANDOM LAB REMINDER   Other ONCE    ferrous sulfate 300 mg (60 mg iron)/5 mL oral syrup 300 mg  300 mg Per G Tube BID WITH MEALS    pantoprazole (PROTONIX) granules for oral suspension 40 mg  40 mg Per G Tube ACB    sevelamer carbonate (RENVELA) oral powder 2.4 g  2.4 g Per G Tube TID WITH MEALS    0.9% sodium chloride infusion 250 mL  250 mL IntraVENous PRN    epoetin giulia (EPOGEN;PROCRIT) injection 8,000 Units  8,000 Units IntraVENous DIALYSIS TUE, THU & SAT    docusate (COLACE) 50 mg/5 mL oral liquid 100 mg  100 mg Oral BID    heparin (porcine) pf 300 Units  300 Units InterCATHeter PRN    bicarbonate dialysis (PRISMASOL) BG K 4/Ca 2.5 5000 ml solution   Extracorporeal DIALYSIS CONTINUOUS    sodium chloride (NS) flush 5-40 mL  5-40 mL IntraVENous PRN    oxyCODONE IR (ROXICODONE) tablet 5 mg  5 mg Oral Q8H PRN    atorvastatin (LIPITOR) tablet 40 mg  40 mg Oral QHS    mirtazapine (REMERON) tablet 15 mg  15 mg Oral QHS    brimonidine (ALPHAGAN) 0.2 % ophthalmic solution 1 Drop  1 Drop Both Eyes TID    dorzolamide (TRUSOPT) 2 % ophthalmic solution 1 Drop  1 Drop Both Eyes TID    polyethylene glycol (MIRALAX) packet 17 g  17 g Oral DAILY    VANCOMYCIN INFORMATION NOTE 1 Each  1 Each Other Rx Dosing/Monitoring    EPINEPHrine (ADRENALIN) 5 mg in 0.9% sodium chloride 250 mL infusion  0-10 mcg/min IntraVENous TITRATE    DOPamine (INTROPIN) 400 mg in dextrose 5% 250 mL infusion  0-50 mcg/kg/min IntraVENous TITRATE    sodium chloride (NS) flush 5-40 mL  5-40 mL IntraVENous PRN    acetaminophen (TYLENOL) tablet 650 mg  650 mg Oral Q6H PRN    Or    acetaminophen (TYLENOL) suppository 650 mg  650 mg Rectal Q6H PRN    polyethylene glycol (MIRALAX) packet 17 g  17 g Oral DAILY PRN    promethazine (PHENERGAN) tablet 12.5 mg  12.5 mg Oral Q6H PRN    Or    ondansetron (ZOFRAN) injection 4 mg  4 mg IntraVENous Q6H PRN    potassium chloride 10 mEq in 100 ml IVPB  20 mEq IntraVENous PRN    magnesium sulfate 2 g/50 ml IVPB (premix or compounded)  2 g IntraVENous PRN    albuterol-ipratropium (DUO-NEB) 2.5 MG-0.5 MG/3 ML  3 mL Nebulization Q6H RT    NOREPINephrine (LEVOPHED) 8 mg in 5% dextrose 250mL (32 mcg/mL) infusion  0.5-16 mcg/min IntraVENous TITRATE    heparin porcine injection 5,000 Units  5,000 Units SubCUTAneous Q8H    cefepime (MAXIPIME) 2 g in 0.9% sodium chloride (MBP/ADV) 100 mL MBP  2 g IntraVENous Q24H    propofol (DIPRIVAN) 10 mg/mL infusion  5 mcg/kg/min IntraVENous TITRATE        Review of Systems    Can't be done  Objective:     Patient Vitals for the past 8 hrs:   BP Temp Pulse Resp SpO2 Height   10/03/20 1545 (!) 136/48 -- (!) 59 19 100 % --   10/03/20 1544 -- -- (!) 58 (!) 34 100 % --   10/03/20 1530 (!) 118/53 -- (!) 59 20 100 % --   10/03/20 1515 (!) 119/48 -- (!) 58 20 100 % --   10/03/20 1500 (!) 129/52 98.9 °F (37.2 °C) (!) 57 20 100 % --   10/03/20 1446 -- -- -- -- -- 5' 9\" (1.753 m)   10/03/20 1445 (!) 129/50 -- (!) 59 19 100 % --   10/03/20 1430 (!) 135/59 -- (!) 55 25 100 % --   10/03/20 1415 (!) 124/52 -- (!) 55 28 100 % --   10/03/20 1400 (!) 130/50 -- (!) 55 25 100 % --   10/03/20 1345 (!) 128/54 -- (!) 55 22 100 % --   10/03/20 1330 (!) 118/48 -- (!) 54 24 100 % --   10/03/20 1315 (!) 111/40 -- (!) 53 19 100 % --   10/03/20 1313 -- -- (!) 54 (!) 33 100 % --   10/03/20 1300 (!) 111/47 -- (!) 52 19 100 % --   10/03/20 1257 (!) 112/51 -- (!) 51 18 100 % --   10/03/20 1200 (!) 113/48 -- -- 17 100 % --   10/03/20 1103 (!) 94/54 -- -- 18 100 % --   10/03/20 1100 -- 98.9 °F (37.2 °C) -- -- -- --   10/03/20 1000 (!) 117/52 -- -- 16 100 % -- 10/03/20 0900 (!) 96/53 -- -- 16 100 % --   10/03/20 0840 -- -- 61 (!) 33 100 % --     10/03 0701 - 10/03 1900  In: -   Out: 2000   10/01 1901 - 10/03 0700  In: 36 [I.V.:368]  Out: -     Physical Exam:   Elderly male cachectic and severely malnourished  with out distress   Right IJ tunneled dialysis catheter  Neuro: He is not  Responding   Lungs - clear   Heart - no pericardial rub   abd- soft ,nontender   Ext - no edema   L    XR CHEST PORT   Final Result   IMPRESSION: Stable chest.      XR CHEST PORT   Final Result      XR CHEST PORT   Final Result   Impression: Underexpanded lungs with bibasilar atelectasis. Possible   hydrostatic edema. XR CHEST PORT   Final Result   IMPRESSION:   1. No new acute process. 2.  Support devices as above. XR CHEST PORT   Final Result   Impression:      Satisfactory position of endotracheal tube. Suboptimal position of enteric tube,   may be advanced 5-10 cm for optimal placement      XR CHEST PORT   Final Result   Impression: Right mainstem bronchus intubation. Repositioning needed. Underexpanded lungs with bibasilar atelectasis. Findings conveyed to the patient's nurse Yanira Sanders on 9/29/2020 at 7:56 AM.         XR CHEST PORT   Final Result   Impression:    1. Distal tracheal intubation as described. (Please note that anterior-posterior   radiography is a one dimensional exam and cannot accurately determine the exact   position of support apparatus. If there is need to better localize structures in   space, a lateral radiograph may prove helpful, as clinically indicated). 2. No developing pulmonary parenchymal or pleural lesion.            Data Review   Recent Labs     10/03/20  0510 10/02/20  1045 10/02/20  0410   WBC 23.3*  --  17.7*   HGB 8.0* 8.0* 6.4*   HCT 24.1* 23.8* 19.6*   PLT 80*  --  83*     Recent Labs     10/03/20  0510 10/02/20  0410   * 131*   K 4.8 4.4   CL 90* 100   CO2 22 23   * 229*   BUN 55* 44*   CREA 4.95* 4.11*   CA 8. 0* 8.0*   PHOS 4.9*  --    ALB 1.4*  --    ALT 31  --      No components found for: Fabio Baker     10/03/20  0605 10/02/20  0220 10/01/20  0215   PH 7.493* 7.49* 7.49*   PCO2 27* 30* 28*   PO2 88 103* 103*   HCO3 23 24 23   FIO2 25.0 25 25     No results for input(s): INR, INREXT, INREXT, INREXT in the last 72 hours. Assessment:           Active Problems:    Cardiac arrest ([facility] [address] (9/25/2020)    ESRD-seen on HD   Uncontrolled diabetes - need high dose insulin sliding scale ? Moderate  hyponatremia with corrected sodium for high glucose  Is 129.4   To be corrected with HD. Anemia- continue JOSE   Sepsis with pneumonia ? Severe malnutrition with alb 1.4 - carries poor prognosis. Plan:   Off CRRT since 9/29/20.    on dialysis  Remove fluid as tolerated   Continue JOSE

## 2020-10-03 NOTE — PROGRESS NOTES
Comprehensive Nutrition Assessment    Type and Reason for Visit: Reassess    Nutrition Recommendations/Plan:   Continue TF of Nepro via NG, increasing goal rate to 40mL/hr, continuous  Increase Prosource to 3 pkts daily (180kcal, 45g protein)  Flush with 175mL free water q4hr  Goal feeds provdie 1908kcal, 123g protein, 1748mL fluid (meeting >96% est needs)    Nutrition Assessment:  Transferred from [facility] d/t loss of IV access; cardiac arrest with resuscitation x4, intubated and sedated on arrival. Initially on 3x pressors, weaned down, now back on 2x. Sedation d/c, pt remains unresponsive. CRRT initiated 9/26, d/c 9/29 d/t filter clot and adjusted to HD. Last HD yesterday with 1L removed. TF initiated 9/29 and has remained at goal with good tolerance since. RN endorses prosource provision, residuals <60mL. Labs: Na 123, , BUN 55, Cr 4.95, H/H 8.0/24.1, ALT and AST elevated, Phos 4.9. Meds: Norepi, dopamine, sevelamer, statin, heparin, FeSu, PPI, colace      Malnutrition Assessment:  Malnutrition Status:  Mild malnutrition    Context:  Acute illness     Findings of the 6 clinical characteristics of malnutrition:   Energy Intake:  1 - 75% or less of est energy req for 7 or more days  Weight Loss:  No significant weight loss     Body Fat Loss:  Unable to assess,     Muscle Mass Loss:  Unable to assess,    Fluid Accumulation:  No significant fluid accumulation,        Estimated Daily Nutrient Needs:  Energy (kcal):  1950kcal  Protein (g):  127g (1.8g/kg)       Fluid (ml/day):  1750mL (25mL/kg)    Nutrition Related Findings:  NFPE without acute findings. Last BM 10/3, loose. No edema.       Wounds:    None       Current Nutrition Therapies:  DIET NPO  DIET TUBE FEEDING Nepro 2 pkt prosource daily  Current Tube Feeding (TF) Orders:   · Feeding Route: Nasogastric  · Formula: Nepro  · Schedule:Continuous    · Regimen: 38mL/hr  · Additives/Modulars: Protein(2 pkt Prosource daily)  · Water Flushes: 175mL free water q4hr  · Current TF & Flush Orders Provides: 1761kcal, 104g protein, 1713mL fluid  · Goal TF & Flush Orders Provides: Rec'd Nepro at goal rate 40mL/hr continuous, 3 pkt Prosource daily, flush with 175mL free water q4hr; providing 1908kcal, 123g protein, 1748mL fluid      Anthropometric Measures:  · Height:  5' 9\" (175.3 cm)  · Current Body Wt:  70.3 kg (154 lb 15.7 oz)(9/25)   · Admission Body Wt:  154 lb 15.7 oz    · Usual Body Wt:  (BRITTANIE)     · Ideal Body Wt:  160 lbs:  96.9 %   · BMI Category:  Normal weight (BMI 22.0-24.9) age over 72     Wt hx: 78.5kg (10/3)  Wt gain noted since admit, likely d/t items on bed vs true wt gain    Nutrition Diagnosis:   · Inadequate oral intake related to impaired respiratory function as evidenced by intubation, nutrition support-enteral nutrition      Nutrition Interventions:   Food and/or Nutrient Delivery: Continue NPO, Continue tube feeding  Nutrition Education and Counseling: No recommendations at this time  Coordination of Nutrition Care: Continued inpatient monitoring    Goals:  Intakes >/= 75% EENs via TF in 7 days  Wt maintenance +/- 0.5kg in 7 days       Nutrition Monitoring and Evaluation:   Behavioral-Environmental Outcomes:   N/A  Food/Nutrient Intake Outcomes: Enteral nutrition intake/tolerance, Diet advancement/tolerance  Physical Signs/Symptoms Outcomes: Constipation, Hemodynamic status    Discharge Planning:     Too soon to determine     Electronically signed by Merle Barrera on 10/3/2020 at 3:00 PM    Contact:

## 2020-10-03 NOTE — PROGRESS NOTES
Pulmonary ICU Progress Note    Subjective:   Daily Progress Note: 10/3/2020 10:09 AM    CC: intubated on ventilator    HPI:  59-year-old male transferred from [facility] as he was admitted there because of generalized weakness shortness of breath abdominal pain and fluid overload she was a renal failure getting hemodialysis. Patient subsequently coded cardiac arrest ACLS protocol was done he was found to be in PEA patient revived intubated on ventilator he has cardiac arrest at least 4 times now he is on vasopressors dopamine levo fed 7 also on vancomycin Maxipime so critical care consult was called for further evaluation   intubated on ventilator hemodynamically unstable on dopamine and levo fed and also getting (continuous renal replacement therapy) CRRT  10/2 remain intubated on ventilator off sedation  10/3 Now again on Levofed and also on Dopamine unresponsive off sedation  Review of Systems  Unable to obtain    Objective:     Visit Vitals  BP (!) 90/47 (BP 1 Location: Left arm, BP Patient Position: At rest)   Pulse 63   Temp 98.9 °F (37.2 °C)   Resp 22   Ht 5' 9.02\" (1.753 m)   Wt 78.5 kg (173 lb 1 oz)   SpO2 100%   BMI 25.54 kg/m²      O2 Device: Ventilator    Temp (24hrs), Av °F (37.8 °C), Min:98.9 °F (37.2 °C), Max:101.1 °F (38.4 °C)      No intake/output data recorded. 10/01 1901 - 10/03 0700  In: 2299 [I.V.:368]  Out: -       General:   Open eyes grimace with sternal rub   Head:  Normocephalic, without obvious abnormality, atraumatic. Nose: Nares normal. Septum midline. Mucosa normal. No drainage or sinus tenderness. Throat: Lips, mucosa, and tongue normal. Teeth and gums normal.   Neck: Supple, symmetrical, trachea midline, no adenopathy, thyroid: no enlargement/tenderness/nodules, no carotid bruit and no JVD. Back:   Symmetric, no curvature. ROM normal. No CVA tenderness.    Lungs:    Bilateral rails   Chest wall:  No tenderness or deformity. Heart:  Regular rate and rhythm, S1, S2 normal, no murmur, click, rub or gallop. Abdomen:   Soft, non-tender. Bowel sounds normal. No masses,  No organomegaly. Extremities: Extremities normal, atraumatic, no cyanosis or edema. Pulses: 2+ and symmetric all extremities. Neurologic:  Off sedation very minimal response           Data Review    Recent Results (from the past 24 hour(s))   HGB & HCT    Collection Time: 10/02/20 10:45 AM   Result Value Ref Range    HGB 8.0 (L) 12.1 - 17.0 g/dL    HCT 23.8 (L) 36.6 - 50.3 %   CBC WITH AUTOMATED DIFF    Collection Time: 10/03/20  5:10 AM   Result Value Ref Range    WBC 23.3 (H) 4.1 - 11.1 K/uL    RBC 2.61 (L) 4.10 - 5.70 M/uL    HGB 8.0 (L) 12.1 - 17.0 g/dL    HCT 24.1 (L) 36.6 - 50.3 %    MCV 92.3 80.0 - 99.0 FL    MCH 30.7 26.0 - 34.0 PG    MCHC 33.2 30.0 - 36.5 g/dL    RDW 17.9 (H) 11.5 - 14.5 %    PLATELET 80 (L) 746 - 400 K/uL    NRBC 0.0 0  WBC    ABSOLUTE NRBC 0.00 0.00 - 0.01 K/uL    NEUTROPHILS 87 (H) 32 - 75 %    LYMPHOCYTES 3 (L) 12 - 49 %    MONOCYTES 9 5 - 13 %    EOSINOPHILS 0 0 - 7 %    BASOPHILS 0 0 - 1 %    IMMATURE GRANULOCYTES 1 (H) 0.0 - 0.5 %    ABS. NEUTROPHILS 20.6 (H) 1.8 - 8.0 K/UL    ABS. LYMPHOCYTES 0.7 (L) 0.8 - 3.5 K/UL    ABS. MONOCYTES 2.0 (H) 0.0 - 1.0 K/UL    ABS. EOSINOPHILS 0.0 0.0 - 0.4 K/UL    ABS. BASOPHILS 0.0 0.0 - 0.1 K/UL    ABS. IMM.  GRANS. 0.1 (H) 0.00 - 0.04 K/UL    DF AUTOMATED     METABOLIC PANEL, COMPREHENSIVE    Collection Time: 10/03/20  5:10 AM   Result Value Ref Range    Sodium 123 (L) 136 - 145 mmol/L    Potassium 4.8 3.5 - 5.1 mmol/L    Chloride 90 (L) 97 - 108 mmol/L    CO2 22 21 - 32 mmol/L    Anion gap 11 5 - 15 mmol/L    Glucose 487 (H) 65 - 100 mg/dL    BUN 55 (H) 6 - 20 mg/dL    Creatinine 4.95 (H) 0.70 - 1.30 mg/dL    BUN/Creatinine ratio 11 (L) 12 - 20      GFR est AA 14 (L) >60 ml/min/1.73m2    GFR est non-AA 12 (L) >60 ml/min/1.73m2    Calcium 8.0 (L) 8.5 - 10.1 mg/dL Bilirubin, total 2.4 (H) 0.2 - 1.0 mg/dL    AST (SGOT) 93 (H) 15 - 37 U/L    ALT (SGPT) 31 12 - 78 U/L    Alk.  phosphatase 134 (H) 45 - 117 U/L    Protein, total 7.0 6.4 - 8.2 g/dL    Albumin 1.4 (L) 3.5 - 5.0 g/dL    Globulin 5.6 (H) 2.0 - 4.0 g/dL    A-G Ratio 0.3 (L) 1.1 - 2.2     PHOSPHORUS    Collection Time: 10/03/20  5:10 AM   Result Value Ref Range    Phosphorus 4.9 (H) 2.6 - 4.7 mg/dL   VANCOMYCIN, TROUGH    Collection Time: 10/03/20  6:00 AM   Result Value Ref Range    Vancomycin,trough 19.7 (H) 5.0 - 10.0 ug/mL    Reported dose date Not provided      Reported dose: Not provided Units   BLOOD GAS, ARTERIAL    Collection Time: 10/03/20  6:05 AM   Result Value Ref Range    pH 7.493 (H) 7.35 - 7.45      PCO2 27 (L) 35 - 45 mmHg    PO2 88 75 - 100 mmHg    O2 SAT 98 >95 %    BICARBONATE 23 22 - 26 mmol/L    BASE DEFICIT 1.8 0 - 2 mmol/L    O2 METHOD VENT      FIO2 25.0 %    MODE SIMV      Tidal volume 500      PRESSURE SUPPORT 10.0      EPAP/CPAP/PEEP 5.0      SITE Left Radial      ESE'S TEST PASS         Current Facility-Administered Medications   Medication Dose Route Frequency    DOPamine (INTROPIN) 1,600 mcg/mL infusion (STANDARD)        ferrous sulfate 300 mg (60 mg iron)/5 mL oral syrup 300 mg  300 mg Per G Tube BID WITH MEALS    pantoprazole (PROTONIX) granules for oral suspension 40 mg  40 mg Per G Tube ACB    sevelamer carbonate (RENVELA) oral powder 2.4 g  2.4 g Per G Tube TID WITH MEALS    0.9% sodium chloride infusion 250 mL  250 mL IntraVENous PRN    epoetin giulia (EPOGEN;PROCRIT) injection 8,000 Units  8,000 Units IntraVENous DIALYSIS TUE, THU & SAT    Vancomycin Random Blood Level   Other ONCE    docusate (COLACE) 50 mg/5 mL oral liquid 100 mg  100 mg Oral BID    heparin (porcine) pf 300 Units  300 Units InterCATHeter PRN    bicarbonate dialysis (PRISMASOL) BG K 4/Ca 2.5 5000 ml solution   Extracorporeal DIALYSIS CONTINUOUS    sodium chloride (NS) flush 5-40 mL  5-40 mL IntraVENous PRN    oxyCODONE IR (ROXICODONE) tablet 5 mg  5 mg Oral Q8H PRN    atorvastatin (LIPITOR) tablet 40 mg  40 mg Oral QHS    mirtazapine (REMERON) tablet 15 mg  15 mg Oral QHS    brimonidine (ALPHAGAN) 0.2 % ophthalmic solution 1 Drop  1 Drop Both Eyes TID    dorzolamide (TRUSOPT) 2 % ophthalmic solution 1 Drop  1 Drop Both Eyes TID    polyethylene glycol (MIRALAX) packet 17 g  17 g Oral DAILY    VANCOMYCIN INFORMATION NOTE 1 Each  1 Each Other Rx Dosing/Monitoring    EPINEPHrine (ADRENALIN) 5 mg in 0.9% sodium chloride 250 mL infusion  0-10 mcg/min IntraVENous TITRATE    DOPamine (INTROPIN) 400 mg in dextrose 5% 250 mL infusion  0-50 mcg/kg/min IntraVENous TITRATE    sodium chloride (NS) flush 5-40 mL  5-40 mL IntraVENous PRN    acetaminophen (TYLENOL) tablet 650 mg  650 mg Oral Q6H PRN    Or    acetaminophen (TYLENOL) suppository 650 mg  650 mg Rectal Q6H PRN    polyethylene glycol (MIRALAX) packet 17 g  17 g Oral DAILY PRN    promethazine (PHENERGAN) tablet 12.5 mg  12.5 mg Oral Q6H PRN    Or    ondansetron (ZOFRAN) injection 4 mg  4 mg IntraVENous Q6H PRN    potassium chloride 10 mEq in 100 ml IVPB  20 mEq IntraVENous PRN    magnesium sulfate 2 g/50 ml IVPB (premix or compounded)  2 g IntraVENous PRN    albuterol-ipratropium (DUO-NEB) 2.5 MG-0.5 MG/3 ML  3 mL Nebulization Q6H RT    NOREPINephrine (LEVOPHED) 8 mg in 5% dextrose 250mL (32 mcg/mL) infusion  0.5-16 mcg/min IntraVENous TITRATE    heparin porcine injection 5,000 Units  5,000 Units SubCUTAneous Q8H    cefepime (MAXIPIME) 2 g in 0.9% sodium chloride (MBP/ADV) 100 mL MBP  2 g IntraVENous Q24H    propofol (DIPRIVAN) 10 mg/mL infusion  5 mcg/kg/min IntraVENous TITRATE         Assessment/Plan:     1. Acute hypoxic respiratory failure  2. Cardiac arrest CODE BLUE x4  3. Aspiration pneumonia  4. Cardiogenic status post code versus septic shock  5. End-stage renal disease on hemodialysis  6.  Chronic Obstructive Pulmonary Disease with Severe Acute Exacerbation requiring inpatient hospitalization and management; has very poor airway clearance. Increased work of breathing  7. Body mass index is 22.88 kg/m². 8. Multiorgan dysfunction as outlined above: Pt has one or more acute or chronic illnesses with severe exacerbation with progression or side effects of treatment that poses a threat to life or bodily function  9. Additional workup outlined below  10. Pt is requiring Drug therapy requiring intensive monitoring for toxicity  11. Pt is unstable, unpredictable needing inpatient monitoring; is acutely ill and at high risk of sudden decline and decompensation with severe consequenses and continued end organ dysfunction and failure  12. Prognosis guarded        RECOMMENDATIONS/PLAN:      1. Patient is intubated on ventilator arterial blood gases shows pH 7.49 PCO2 27 PO2 88  corrected change vent setting to SIMV  2. Patient is off sedation since 9/30 still has no response  3. Patient on dopamine and also on Levofed he recieved CRRT andLevophed has been weaned off  4. Aspiration pneumonia sputum came positive for gram-positive cocci in chain and also in clusters and also has some yeast most likely contamination or colonization  5. Ventilator settings of assist-control rate of 12 tidal volume 500 PEEP of 5 FiO2 325% we will decrease the rate to 8  6. Agree with Empiric IV antibiotics vancomycin and Maxipime   7. abdominal CAT scan was negative  8. Hemodynamically unstable we will continue current vent setting  SIMV mode no plan for extubation   Discussed with his son regarding his status Yovany Barreto [phone] He will talk to his brother and make decision     Total time spent with patient: 35 minutes.

## 2020-10-03 NOTE — PROGRESS NOTES
Progress Note    Patient: Lay Gibbs MRN: [id number]  SSN: [id number]    YOB: 1950  Age: 78 y.o. Sex: male      Admit Date: 9/25/2020    LOS: 8 days     Subjective:     69M, H/o ESRD on HD and HLD and HTN with cardiac arrest x 4, multiorgan dysfunction and acute hypoxic respiratory failure. He presented here from [facility] after cardiac arrest s/t possible aspiration pneumonia. Remains intubated, on propofol and 5 mcg/kg/min of dopamine, inc 2/2 bob.        Remains intubated  Off sedation since yesterday, minimally responsive  TM 99(vanco/cefepime/presumptive aspiration)  HGB to 8.0 s/p prbc x 2 10/2. Remains on dopamine 5mcg/kg/min        Review of Systems:  Unable to determine s/t intubated/sedate      Objective:   Patient Vitals for the past 8 hrs:    BP Pulse Resp SpO2   09/30/20 1221 (!) 104/55 77 23 100 %   09/30/20 1127 (!) 110/54 -- 15 100 %   09/30/20 1025 121/73 -- 15 100 %   09/30/20 0900 (!) 107/52 -- 15 100 %   09/30/20 0800 (!) 106/47 -- 15 100 %         Physical Exam:   General : Appearance:  Intubated and sedate  HEENT : PERRLA,ETT/ngt  Neck : no JVD, no masses noted  Lungs : Intubated, No wheezing, creps bases  CVS : Rhythm rate regular, S1+, S2+, no murmur or gallop  Abdomen : Soft, nontender, no organomegaly, bowel sounds active  Extremities : No edema noted,  pedal pulses palpable, LT fem cvc  Musculoskeletal : flaccid extremities  Skin : Moist, warm, skin turgor, no pathological rash  Lymphatic : No cervical lymphadenopathy.   Neurological :intubated, responsive to deep pain  Psychiatric : cannot be assessed, calm      Lab/Data Review:  Recent Results (from the past 24 hour(s))   CBC WITH AUTOMATED DIFF    Collection Time: 10/03/20  5:10 AM   Result Value Ref Range    WBC 23.3 (H) 4.1 - 11.1 K/uL    RBC 2.61 (L) 4.10 - 5.70 M/uL    HGB 8.0 (L) 12.1 - 17.0 g/dL    HCT 24.1 (L) 36.6 - 50.3 %    MCV 92.3 80.0 - 99.0 FL    MCH 30.7 26.0 - 34.0 PG    MCHC 33.2 30.0 - 36.5 g/dL    RDW 17.9 (H) 11.5 - 14.5 %    PLATELET 80 (L) 490 - 400 K/uL    NRBC 0.0 0  WBC    ABSOLUTE NRBC 0.00 0.00 - 0.01 K/uL    NEUTROPHILS 87 (H) 32 - 75 %    LYMPHOCYTES 3 (L) 12 - 49 %    MONOCYTES 9 5 - 13 %    EOSINOPHILS 0 0 - 7 %    BASOPHILS 0 0 - 1 %    IMMATURE GRANULOCYTES 1 (H) 0.0 - 0.5 %    ABS. NEUTROPHILS 20.6 (H) 1.8 - 8.0 K/UL    ABS. LYMPHOCYTES 0.7 (L) 0.8 - 3.5 K/UL    ABS. MONOCYTES 2.0 (H) 0.0 - 1.0 K/UL    ABS. EOSINOPHILS 0.0 0.0 - 0.4 K/UL    ABS. BASOPHILS 0.0 0.0 - 0.1 K/UL    ABS. IMM. GRANS. 0.1 (H) 0.00 - 0.04 K/UL    DF AUTOMATED     METABOLIC PANEL, COMPREHENSIVE    Collection Time: 10/03/20  5:10 AM   Result Value Ref Range    Sodium 123 (L) 136 - 145 mmol/L    Potassium 4.8 3.5 - 5.1 mmol/L    Chloride 90 (L) 97 - 108 mmol/L    CO2 22 21 - 32 mmol/L    Anion gap 11 5 - 15 mmol/L    Glucose 487 (H) 65 - 100 mg/dL    BUN 55 (H) 6 - 20 mg/dL    Creatinine 4.95 (H) 0.70 - 1.30 mg/dL    BUN/Creatinine ratio 11 (L) 12 - 20      GFR est AA 14 (L) >60 ml/min/1.73m2    GFR est non-AA 12 (L) >60 ml/min/1.73m2    Calcium 8.0 (L) 8.5 - 10.1 mg/dL    Bilirubin, total 2.4 (H) 0.2 - 1.0 mg/dL    AST (SGOT) 93 (H) 15 - 37 U/L    ALT (SGPT) 31 12 - 78 U/L    Alk.  phosphatase 134 (H) 45 - 117 U/L    Protein, total 7.0 6.4 - 8.2 g/dL    Albumin 1.4 (L) 3.5 - 5.0 g/dL    Globulin 5.6 (H) 2.0 - 4.0 g/dL    A-G Ratio 0.3 (L) 1.1 - 2.2     PHOSPHORUS    Collection Time: 10/03/20  5:10 AM   Result Value Ref Range    Phosphorus 4.9 (H) 2.6 - 4.7 mg/dL   VANCOMYCIN, TROUGH    Collection Time: 10/03/20  6:00 AM   Result Value Ref Range    Vancomycin,trough 19.7 (H) 5.0 - 10.0 ug/mL    Reported dose date Not provided      Reported dose: Not provided Units   BLOOD GAS, ARTERIAL    Collection Time: 10/03/20  6:05 AM   Result Value Ref Range    pH 7.493 (H) 7.35 - 7.45      PCO2 27 (L) 35 - 45 mmHg    PO2 88 75 - 100 mmHg    O2 SAT 98 >95 %    BICARBONATE 23 22 - 26 mmol/L    BASE DEFICIT 1.8 0 - 2 mmol/L    O2 METHOD VENT      FIO2 25.0 %    MODE SIMV      Tidal volume 500      PRESSURE SUPPORT 10.0      EPAP/CPAP/PEEP 5.0      SITE Left Radial      ESE'S TEST PASS         Current Facility-Administered Medications:     vancomycin (VANCOCIN) 1,000 mg in 0.9% sodium chloride 250 mL (VIAL-MATE), 1,000 mg, IntraVENous, ONCE, Franco Murcia MD    [START ON 10/6/2020] VANCOMYCIN RANDOM LAB REMINDER, , Other, ONCE, Franco Tovar MD    ferrous sulfate 300 mg (60 mg iron)/5 mL oral syrup 300 mg, 300 mg, Per G Tube, BID WITH MEALS, Franco Murcia MD, 300 mg at 10/03/20 0845    pantoprazole (PROTONIX) granules for oral suspension 40 mg, 40 mg, Per G Tube, ACB, Franco Murcia MD, 40 mg at 10/03/20 0845    sevelamer carbonate (RENVELA) oral powder 2.4 g, 2.4 g, Per G Tube, TID WITH MEALS, Franco Murcia MD, 2.4 g at 10/03/20 1231    0.9% sodium chloride infusion 250 mL, 250 mL, IntraVENous, PRN, Eric CARTER MD    epoetin giulia (EPOGEN;PROCRIT) injection 8,000 Units, 8,000 Units, IntraVENous, DIALYSIS TUE, THU & SAT, Rich Burks MD, 8,000 Units at 10/03/20 1506    docusate (COLACE) 50 mg/5 mL oral liquid 100 mg, 100 mg, Oral, BID, Dre Luis MD, 100 mg at 10/03/20 0845    heparin (porcine) pf 300 Units, 300 Units, InterCATHeter, PRN, Rich Burks MD, 300 Units at 10/02/20 1640    bicarbonate dialysis (PRISMASOL) BG K 4/Ca 2.5 5000 ml solution, , Extracorporeal, DIALYSIS CONTINUOUS, Rich Burks MD, Last Rate: 800 mL/hr at 09/28/20 1608, 800 mL/hr at 09/28/20 1608    sodium chloride (NS) flush 5-40 mL, 5-40 mL, IntraVENous, PRN, Melissa Kelly MD    oxyCODONE IR (ROXICODONE) tablet 5 mg, 5 mg, Oral, Q8H PRN, Melissa Kelly MD    atorvastatin (LIPITOR) tablet 40 mg, 40 mg, Oral, QHS, Melissa Kelly MD, 40 mg at 10/02/20 2121    mirtazapine (REMERON) tablet 15 mg, 15 mg, Oral, QHS, Melissa Kelly MD, 15 mg at 10/02/20 2121    brimonidine (ALPHAGAN) 0.2 % ophthalmic solution 1 Drop, 1 Drop, Both Eyes, TID, Angel Donovan MD, 1 Drop at 10/03/20 0848    dorzolamide (TRUSOPT) 2 % ophthalmic solution 1 Drop, 1 Drop, Both Eyes, TID, Angel Donovan MD, 1 Drop at 10/03/20 0847    polyethylene glycol (MIRALAX) packet 17 g, 17 g, Oral, DAILY, Angel Donovan MD, 17 g at 10/03/20 0846    VANCOMYCIN INFORMATION NOTE 1 Each, 1 Each, Other, Rx Dosing/Monitoring, Angel Donovan MD    EPINEPHrine (ADRENALIN) 5 mg in 0.9% sodium chloride 250 mL infusion, 0-10 mcg/min, IntraVENous, TITRATE, Angel Donovan MD, Last Rate: 6 mL/hr at 09/26/20 0109, 2 mcg/min at 09/26/20 0109    DOPamine (INTROPIN) 400 mg in dextrose 5% 250 mL infusion, 0-50 mcg/kg/min, IntraVENous, TITRATE, Angel Donovan MD, Last Rate: 38.6 mL/hr at 10/03/20 1231, 15 mcg/kg/min at 10/03/20 1231    sodium chloride (NS) flush 5-40 mL, 5-40 mL, IntraVENous, PRN, Angel Donovan MD    acetaminophen (TYLENOL) tablet 650 mg, 650 mg, Oral, Q6H PRN **OR** acetaminophen (TYLENOL) suppository 650 mg, 650 mg, Rectal, Q6H PRN, Angel Donovan MD    polyethylene glycol (MIRALAX) packet 17 g, 17 g, Oral, DAILY PRN, Angel Donovan MD    promethazine (PHENERGAN) tablet 12.5 mg, 12.5 mg, Oral, Q6H PRN **OR** ondansetron (ZOFRAN) injection 4 mg, 4 mg, IntraVENous, Q6H PRN, Angel Donovan MD    potassium chloride 10 mEq in 100 ml IVPB, 20 mEq, IntraVENous, PRN, Angel Donovan MD, Last Rate: 200 mL/hr at 10/02/20 0905, 20 mEq at 10/02/20 0905    magnesium sulfate 2 g/50 ml IVPB (premix or compounded), 2 g, IntraVENous, PRN, Angel Donovan MD, Last Rate: 50 mL/hr at 10/01/20 0921, 2 g at 10/01/20 0921    albuterol-ipratropium (DUO-NEB) 2.5 MG-0.5 MG/3 ML, 3 mL, Nebulization, Q6H RT, Angel Donovan MD, 3 mL at 10/03/20 1543    NOREPINephrine (LEVOPHED) 8 mg in 5% dextrose 250mL (32 mcg/mL) infusion, 0.5-16 mcg/min, IntraVENous, TITRATE, Angel Donovan MD, Last Rate: 7.5 mL/hr at 10/03/20 0348, 4 mcg/min at 10/03/20 0348    heparin porcine injection 5,000 Units, 5,000 Units, SubCUTAneous, Q8H, Cordelia Cleaning MD, 5,000 Units at 10/03/20 0846    cefepime (MAXIPIME) 2 g in 0.9% sodium chloride (MBP/ADV) 100 mL MBP, 2 g, IntraVENous, Q24H, Mary BILLINGSLEY MD, Last Rate: 200 mL/hr at 10/03/20 0848, 2 g at 10/03/20 0848    propofol (DIPRIVAN) 10 mg/mL infusion, 5 mcg/kg/min, IntraVENous, TITRATE, Cordelia Cleaning MD, Stopped at 10/01/20 0800      Assessment and plan:      --Acute hypoxic respiratory failure : intubated/sedated, pulmonology following, weaning trials continue, failed    --Cardiac arrest x 4 + multiorgan dysfunction: likely aspiration event. On dopamine 2/2 bob    --Septic shock: on pressors, vancomycin and cefepime fu cs    --aspiration pneumonia : vancomycin and cefepime    --AECOPD: complicates     --ESRD on HD: off crrt from 9/29,nephrology following. --Acute encephalopathy : likely hypoxia. Treating above first then tuen off sedation to assess. --Anemia: multifactorial/fe def, aoci. Transfused 2 units prbc 10/2.  hgb 8.0. Poor prognosis.   Continue icu care          Signed By: Hernan Gerardo MD     October 3, 2020

## 2020-10-04 NOTE — PROGRESS NOTES
Pulmonary ICU Progress Note    Subjective:   Daily Progress Note: 10/4/2020 10:09 AM    CC: intubated on ventilator    HPI:  39-year-old male transferred from [facility] as he was admitted there because of generalized weakness shortness of breath abdominal pain and fluid overload she was a renal failure getting hemodialysis. Patient subsequently coded cardiac arrest ACLS protocol was done he was found to be in PEA patient revived intubated on ventilator he has cardiac arrest at least 4 times now he is on vasopressors dopamine levo fed 7 also on vancomycin Maxipime so critical care consult was called for further evaluation   intubated on ventilator hemodynamically unstable on dopamine and levo fed and also getting (continuous renal replacement therapy) CRRT  10/2 remain intubated on ventilator off sedation  10/3 Now again on Levofed and also on Dopamine unresponsive off sedation  10/4 remains unresponsive on the ventilator on no sedation currently on dopamine Levophed discontinued  Review of Systems  Unable to obtain    Objective:     Visit Vitals  BP (!) 113/58 (BP 1 Location: Left arm, BP Patient Position: At rest)   Pulse (!) 59   Temp 99 °F (37.2 °C)   Resp 13   Ht 5' 9\" (1.753 m)   Wt 76 kg (167 lb 8.8 oz)   SpO2 100%   BMI 24.74 kg/m²      O2 Device: Ventilator    Temp (24hrs), Av.5 °F (37.5 °C), Min:98.9 °F (37.2 °C), Max:100.3 °F (37.9 °C)      No intake/output data recorded. 10/02 1901 - 10/04 0700  In: 2242 [I.V.:782]  Out: 4000       General:   Eyes are closed grimaces when try to open them   Head:  Normocephalic, without obvious abnormality, atraumatic. When eyelids are opened eyes are disconjugate with some random eye movement           Nose: Nares normal. Septum midline.  Mucosa normal.    Throat: Lips, mucosa, and tongue normal. Teeth and gums normal.   Neck: Supple, symmetrical, trachea midline, no adenopathy, thyroid: no enlargement/tenderness/nodules, no carotid bruit and no JVD. Lungs:    Bilateral rails   Chest wall:  No tenderness or deformity. Heart:  Regular rate and rhythm, S1, S2 normal, no murmur, click, rub or gallop. Abdomen:   Soft, non-tender. Bowel sounds normal. No masses,  No organomegaly. Extremities: Extremities normal, atraumatic, no cyanosis or edema. Pulses: 2+ and symmetric all extremities. Neurologic:  Off sedation grimaces when you try to open his eyes eyes are disconjugate somewhat random to and fro motion when eyelids are open flaccid extremities           Data Review    Recent Results (from the past 24 hour(s))   PHOSPHORUS    Collection Time: 10/04/20  4:30 AM   Result Value Ref Range    Phosphorus 3.9 2.6 - 4.7 mg/dL   CBC WITH AUTOMATED DIFF    Collection Time: 10/04/20  4:30 AM   Result Value Ref Range    WBC 19.5 (H) 4.1 - 11.1 K/uL    RBC 2.54 (L) 4.10 - 5.70 M/uL    HGB 7.6 (L) 12.1 - 17.0 g/dL    HCT 23.2 (L) 36.6 - 50.3 %    MCV 91.3 80.0 - 99.0 FL    MCH 29.9 26.0 - 34.0 PG    MCHC 32.8 30.0 - 36.5 g/dL    RDW 17.7 (H) 11.5 - 14.5 %    PLATELET 91 (L) 908 - 400 K/uL    NEUTROPHILS 89 (H) 32 - 75 %    LYMPHOCYTES 6 (L) 12 - 49 %    MONOCYTES 5 5 - 13 %    EOSINOPHILS 0 0 - 7 %    BASOPHILS 0 0 - 1 %    IMMATURE GRANULOCYTES 0 0.0 - 0.5 %    ABS. NEUTROPHILS 17.3 (H) 1.8 - 8.0 K/UL    ABS. LYMPHOCYTES 1.2 0.8 - 3.5 K/UL    ABS. MONOCYTES 1.0 0.0 - 1.0 K/UL    ABS. EOSINOPHILS 0.0 0.0 - 0.4 K/UL    ABS. BASOPHILS 0.0 0.0 - 0.1 K/UL    ABS. IMM.  GRANS. 0.1 (H) 0.00 - 0.04 K/UL    DF AUTOMATED     METABOLIC PANEL, BASIC    Collection Time: 10/04/20  4:30 AM   Result Value Ref Range    Sodium 129 (L) 136 - 145 mmol/L    Potassium 4.1 3.5 - 5.1 mmol/L    Chloride 93 (L) 97 - 108 mmol/L    CO2 29 21 - 32 mmol/L    Anion gap 7 5 - 15 mmol/L    Glucose 309 (H) 65 - 100 mg/dL    BUN 44 (H) 6 - 20 mg/dL    Creatinine 4.12 (H) 0.70 - 1.30 mg/dL    BUN/Creatinine ratio 11 (L) 12 - 20      GFR est AA 17 (L) >60 ml/min/1.73m2    GFR est non-AA 14 (L) >60 ml/min/1.73m2    Calcium 8.4 (L) 8.5 - 10.1 mg/dL   GLUCOSE, POC    Collection Time: 10/04/20  4:38 AM   Result Value Ref Range    Glucose (POC) 364 (H) 65 - 100 mg/dL    Performed by [facility]        Current Facility-Administered Medications   Medication Dose Route Frequency    [START ON 10/6/2020] VANCOMYCIN RANDOM LAB REMINDER   Other ONCE    ferrous sulfate 300 mg (60 mg iron)/5 mL oral syrup 300 mg  300 mg Per G Tube BID WITH MEALS    pantoprazole (PROTONIX) granules for oral suspension 40 mg  40 mg Per G Tube ACB    sevelamer carbonate (RENVELA) oral powder 2.4 g  2.4 g Per G Tube TID WITH MEALS    0.9% sodium chloride infusion 250 mL  250 mL IntraVENous PRN    epoetin giulia (EPOGEN;PROCRIT) injection 8,000 Units  8,000 Units IntraVENous DIALYSIS TUE, THU & SAT    docusate (COLACE) 50 mg/5 mL oral liquid 100 mg  100 mg Oral BID    heparin (porcine) pf 300 Units  300 Units InterCATHeter PRN    bicarbonate dialysis (PRISMASOL) BG K 4/Ca 2.5 5000 ml solution   Extracorporeal DIALYSIS CONTINUOUS    sodium chloride (NS) flush 5-40 mL  5-40 mL IntraVENous PRN    oxyCODONE IR (ROXICODONE) tablet 5 mg  5 mg Oral Q8H PRN    atorvastatin (LIPITOR) tablet 40 mg  40 mg Oral QHS    mirtazapine (REMERON) tablet 15 mg  15 mg Oral QHS    brimonidine (ALPHAGAN) 0.2 % ophthalmic solution 1 Drop  1 Drop Both Eyes TID    dorzolamide (TRUSOPT) 2 % ophthalmic solution 1 Drop  1 Drop Both Eyes TID    polyethylene glycol (MIRALAX) packet 17 g  17 g Oral DAILY    VANCOMYCIN INFORMATION NOTE 1 Each  1 Each Other Rx Dosing/Monitoring    EPINEPHrine (ADRENALIN) 5 mg in 0.9% sodium chloride 250 mL infusion  0-10 mcg/min IntraVENous TITRATE    DOPamine (INTROPIN) 400 mg in dextrose 5% 250 mL infusion  0-50 mcg/kg/min IntraVENous TITRATE    sodium chloride (NS) flush 5-40 mL  5-40 mL IntraVENous PRN    acetaminophen (TYLENOL) tablet 650 mg  650 mg Oral Q6H PRN    Or    acetaminophen (TYLENOL) suppository 650 mg  650 mg Rectal Q6H PRN    polyethylene glycol (MIRALAX) packet 17 g  17 g Oral DAILY PRN    promethazine (PHENERGAN) tablet 12.5 mg  12.5 mg Oral Q6H PRN    Or    ondansetron (ZOFRAN) injection 4 mg  4 mg IntraVENous Q6H PRN    potassium chloride 10 mEq in 100 ml IVPB  20 mEq IntraVENous PRN    magnesium sulfate 2 g/50 ml IVPB (premix or compounded)  2 g IntraVENous PRN    albuterol-ipratropium (DUO-NEB) 2.5 MG-0.5 MG/3 ML  3 mL Nebulization Q6H RT    NOREPINephrine (LEVOPHED) 8 mg in 5% dextrose 250mL (32 mcg/mL) infusion  0.5-16 mcg/min IntraVENous TITRATE    heparin porcine injection 5,000 Units  5,000 Units SubCUTAneous Q8H    cefepime (MAXIPIME) 2 g in 0.9% sodium chloride (MBP/ADV) 100 mL MBP  2 g IntraVENous Q24H    propofol (DIPRIVAN) 10 mg/mL infusion  5 mcg/kg/min IntraVENous TITRATE         AP semierect portable at 0347 on 10/3/2020.     An endotracheal tube, nasogastric tube and central line remain in place.     The heart remains normal in size. There is calcified plaque in the aorta. Mild  interstitial edema is redemonstrated with similar features seen on the prior  study. There is no lobar consolidation or pleural fluid bilaterally.     IMPRESSION  IMPRESSION: Stable chest.  Assessment/Plan:     1. Acute hypoxic respiratory failure does have spontaneous respirations on the ventilator  2. Cardiac arrest CODE BLUE x4  3. MRSA septicemia  4. Aspiration pneumonia last chest x-ray relatively clear  5. Cardiogenic status post code versus septic shock currently on dopamine  6. End-stage renal disease on hemodialysis  7. Chronic Obstructive Pulmonary Disease with Severe Acute Exacerbation requiring inpatient hospitalization and management; no wheezing today  8. Body mass index is 22.88 kg/m².       RECOMMENDATIONS/PLAN:      1. Patient is intubated on ventilator tolerating current vent settings  2.  Patient is off sedation since 9/30 remains obtunded does have self respirations and mild cough reflex to tracheal suctioning  3. Patient on dopamine  4. Aspiration pneumonia sputum with MRSA  5. Continue vancomycin and Maxipime   6. abdominal CAT scan was negative  7. Hemodynamically unstable we will continue current vent setting  SIMV mode no plan for extubation       Total time spent with patient: 30 minutes. Natalie Garcia.  Dileep Villegas MD

## 2020-10-04 NOTE — PROGRESS NOTES
Progress Note    Patient: Helen Garrido MRN: [id number]  SSN: [id number]    YOB: 1950  Age: 78 y.o. Sex: male      Admit Date: 9/25/2020    LOS: 9 days     Subjective:     69M, H/o ESRD on HD and HLD and HTN with cardiac arrest x 4, multiorgan dysfunction and acute hypoxic respiratory failure. He presented here from [facility] after cardiac arrest s/t possible aspiration pneumonia. Remains intubated, on propofol and 5 mcg/kg/min of dopamine, inc 2/2 bob.        Remains intubated  Off sedation since yesterday, minimally responsive  . 3(vanco/cefepime/presumptive aspiration)  HGB8.0  Remains on dopamine/levophed 5mcg/kg/min        Review of Systems:  Unable to determine s/t intubated/sedate      Objective:   Patient Vitals for the past 8 hrs:    BP Pulse Resp SpO2   09/30/20 1221 (!) 104/55 77 23 100 %   09/30/20 1127 (!) 110/54 -- 15 100 %   09/30/20 1025 121/73 -- 15 100 %   09/30/20 0900 (!) 107/52 -- 15 100 %   09/30/20 0800 (!) 106/47 -- 15 100 %         Physical Exam:   General : Appearance:  Intubated and sedate  HEENT : PERRLA,ETT/ngt  Neck : no JVD, no masses noted  Lungs : Intubated, No wheezing, creps bases  CVS : Rhythm rate regular, S1+, S2+, no murmur or gallop  Abdomen : Soft, nontender, no organomegaly, bowel sounds active  Extremities : No edema noted,  pedal pulses palpable, LT fem cvc  Musculoskeletal : flaccid extremities  Skin : Moist, warm, skin turgor, no pathological rash  Lymphatic : No cervical lymphadenopathy.   Neurological :intubated, responsive to deep pain  Psychiatric : cannot be assessed, calm      Lab/Data Review:  Recent Results (from the past 24 hour(s))   PHOSPHORUS    Collection Time: 10/04/20  4:30 AM   Result Value Ref Range    Phosphorus 3.9 2.6 - 4.7 mg/dL   CBC WITH AUTOMATED DIFF    Collection Time: 10/04/20  4:30 AM   Result Value Ref Range    WBC 19.5 (H) 4.1 - 11.1 K/uL    RBC 2.54 (L) 4.10 - 5.70 M/uL    HGB 7.6 (L) 12.1 - 17.0 g/dL    HCT 23.2 (L) 36.6 - 50.3 %    MCV 91.3 80.0 - 99.0 FL    MCH 29.9 26.0 - 34.0 PG    MCHC 32.8 30.0 - 36.5 g/dL    RDW 17.7 (H) 11.5 - 14.5 %    PLATELET 91 (L) 344 - 400 K/uL    NEUTROPHILS 89 (H) 32 - 75 %    LYMPHOCYTES 6 (L) 12 - 49 %    MONOCYTES 5 5 - 13 %    EOSINOPHILS 0 0 - 7 %    BASOPHILS 0 0 - 1 %    IMMATURE GRANULOCYTES 0 0.0 - 0.5 %    ABS. NEUTROPHILS 17.3 (H) 1.8 - 8.0 K/UL    ABS. LYMPHOCYTES 1.2 0.8 - 3.5 K/UL    ABS. MONOCYTES 1.0 0.0 - 1.0 K/UL    ABS. EOSINOPHILS 0.0 0.0 - 0.4 K/UL    ABS. BASOPHILS 0.0 0.0 - 0.1 K/UL    ABS. IMM.  GRANS. 0.1 (H) 0.00 - 0.04 K/UL    DF AUTOMATED     METABOLIC PANEL, BASIC    Collection Time: 10/04/20  4:30 AM   Result Value Ref Range    Sodium 129 (L) 136 - 145 mmol/L    Potassium 4.1 3.5 - 5.1 mmol/L    Chloride 93 (L) 97 - 108 mmol/L    CO2 29 21 - 32 mmol/L    Anion gap 7 5 - 15 mmol/L    Glucose 309 (H) 65 - 100 mg/dL    BUN 44 (H) 6 - 20 mg/dL    Creatinine 4.12 (H) 0.70 - 1.30 mg/dL    BUN/Creatinine ratio 11 (L) 12 - 20      GFR est AA 17 (L) >60 ml/min/1.73m2    GFR est non-AA 14 (L) >60 ml/min/1.73m2    Calcium 8.4 (L) 8.5 - 10.1 mg/dL   GLUCOSE, POC    Collection Time: 10/04/20  4:38 AM   Result Value Ref Range    Glucose (POC) 364 (H) 65 - 100 mg/dL    Performed by [id number], POC    Collection Time: 10/04/20  7:12 PM   Result Value Ref Range    Glucose (POC) 292 (H) 65 - 100 mg/dL    Performed by Raymond Mayer        Current Facility-Administered Medications:     metoclopramide HCl (REGLAN) injection 5 mg, 5 mg, IntraVENous, Q6H, Juliana Burgos MD, 5 mg at 10/04/20 1721    [START ON 10/6/2020] VANCOMYCIN RANDOM LAB REMINDER, , Other, ONCE, Franco Tovar MD    ferrous sulfate 300 mg (60 mg iron)/5 mL oral syrup 300 mg, 300 mg, Per G Tube, BID WITH MEALS, Franco Méndez MD, 300 mg at 10/04/20 1721    pantoprazole (PROTONIX) granules for oral suspension 40 mg, 40 mg, Per G Tube, ACB, Franco Méndez MD, 40 mg at 10/04/20 9438    sevelamer carbonate (RENVELA) oral powder 2.4 g, 2.4 g, Per G Tube, TID WITH MEALS, Franco Méndez MD, 2.4 g at 10/04/20 1721    0.9% sodium chloride infusion 250 mL, 250 mL, IntraVENous, PRN, Alise CARTER MD    epoetin giulia (EPOGEN;PROCRIT) injection 8,000 Units, 8,000 Units, IntraVENous, DIALYSIS TUE, THU & SAT, Rich Burks MD, 8,000 Units at 10/03/20 1506    docusate (COLACE) 50 mg/5 mL oral liquid 100 mg, 100 mg, Oral, BID, Treva Monroe MD, 100 mg at 10/04/20 0846    heparin (porcine) pf 300 Units, 300 Units, InterCATHeter, PRN, Rich Burks MD, 300 Units at 10/02/20 1640    bicarbonate dialysis (PRISMASOL) BG K 4/Ca 2.5 5000 ml solution, , Extracorporeal, DIALYSIS CONTINUOUS, Rich Burks MD, Last Rate: 800 mL/hr at 09/28/20 1608, 800 mL/hr at 09/28/20 1608    sodium chloride (NS) flush 5-40 mL, 5-40 mL, IntraVENous, PRN, Moise Nails MD    oxyCODONE IR (ROXICODONE) tablet 5 mg, 5 mg, Oral, Q8H PRN, Moise Nails MD    atorvastatin (LIPITOR) tablet 40 mg, 40 mg, Oral, QHS, Moise Nails MD, 40 mg at 10/03/20 2238    mirtazapine (REMERON) tablet 15 mg, 15 mg, Oral, QHS, Moise Nails MD, 15 mg at 10/03/20 2238    brimonidine (ALPHAGAN) 0.2 % ophthalmic solution 1 Drop, 1 Drop, Both Eyes, TID, Moise Nails MD, 1 Drop at 10/04/20 1722    dorzolamide (TRUSOPT) 2 % ophthalmic solution 1 Drop, 1 Drop, Both Eyes, TID, Moise Nails MD, 1 Drop at 10/04/20 1722    polyethylene glycol (MIRALAX) packet 17 g, 17 g, Oral, DAILY, Moise Nails MD, 17 g at 10/04/20 0846    VANCOMYCIN INFORMATION NOTE 1 Each, 1 Each, Other, Rx Dosing/Monitoring, Moise Nails MD    EPINEPHrine (ADRENALIN) 5 mg in 0.9% sodium chloride 250 mL infusion, 0-10 mcg/min, IntraVENous, TITRATE, oMise Nails MD, Last Rate: 6 mL/hr at 09/26/20 0109, 2 mcg/min at 09/26/20 0109    DOPamine (INTROPIN) 400 mg in dextrose 5% 250 mL infusion, 0-50 mcg/kg/min, IntraVENous, TITRATE, Amaury Roe MD, Last Rate: 25.7 mL/hr at 10/04/20 1721, 10 mcg/kg/min at 10/04/20 1721    sodium chloride (NS) flush 5-40 mL, 5-40 mL, IntraVENous, PRN, Amaury Roe MD    acetaminophen (TYLENOL) tablet 650 mg, 650 mg, Oral, Q6H PRN **OR** acetaminophen (TYLENOL) suppository 650 mg, 650 mg, Rectal, Q6H PRN, Amaury Roe MD    polyethylene glycol (MIRALAX) packet 17 g, 17 g, Oral, DAILY PRN, Amaury Roe MD    promethazine (PHENERGAN) tablet 12.5 mg, 12.5 mg, Oral, Q6H PRN **OR** ondansetron (ZOFRAN) injection 4 mg, 4 mg, IntraVENous, Q6H PRN, Amaury Roe MD    potassium chloride 10 mEq in 100 ml IVPB, 20 mEq, IntraVENous, PRN, Amaury Roe MD, Last Rate: 200 mL/hr at 10/02/20 0905, 20 mEq at 10/02/20 0905    magnesium sulfate 2 g/50 ml IVPB (premix or compounded), 2 g, IntraVENous, PRN, Amaury Roe MD, Last Rate: 50 mL/hr at 10/01/20 0921, 2 g at 10/01/20 0921    albuterol-ipratropium (DUO-NEB) 2.5 MG-0.5 MG/3 ML, 3 mL, Nebulization, Q6H RT, Amaury Roe MD, 3 mL at 10/04/20 1342    NOREPINephrine (LEVOPHED) 8 mg in 5% dextrose 250mL (32 mcg/mL) infusion, 0.5-16 mcg/min, IntraVENous, TITRATE, Amaury Roe MD, Stopped at 10/03/20 1651    heparin porcine injection 5,000 Units, 5,000 Units, SubCUTAneous, Q8H, Amaury Roe MD, 5,000 Units at 10/04/20 1427    cefepime (MAXIPIME) 2 g in 0.9% sodium chloride (MBP/ADV) 100 mL MBP, 2 g, IntraVENous, Q24H, QaiyumAna María MD, Last Rate: 200 mL/hr at 10/04/20 0848, 2 g at 10/04/20 0848    propofol (DIPRIVAN) 10 mg/mL infusion, 5 mcg/kg/min, IntraVENous, TITRATE, Amaury Roe MD, Stopped at 10/01/20 0800      Assessment and plan:      --Acute hypoxic respiratory failure : intubated/sedated, pulmonology following, weaning trials continue, failed    --Cardiac arrest x 4 + multiorgan dysfunction: likely aspiration event.  On dopamine 2/2 bob    --Septic shock: on pressors/levophed/dopamine, vancomycin and cefepime fu cs    --aspiration pneumonia : vancomycin and cefepime    --AECOPD: complicates     --ESRD on HD: off crrt from 9/29,nephrology following. --Acute encephalopathy : likely hypoxia. Treating above first then tuen off sedation to assess. --Anemia: multifactorial/fe def, aoci. Transfused 2 units prbc 10/2.  hgb 8.0. Poor prognosis.   Continue icu care          Signed By: Emily Deras MD     October 4, 2020

## 2020-10-04 NOTE — PROGRESS NOTES
Renal Daily Progress Note    Admit Date: 9/25/2020      Subjective:   Continues to do poorly, still on the on the vent and on Levophed  S/p  cardiorespiratory arrest  Not sure whether he has any family to discuss about further care   as he remains unresponsive  Labs, medications and intakes and outputs are reviewed  He tolerated dialysis without any complications yesterday  Hemoglobin/ hematocrit remains low despite JOSE                  Current Facility-Administered Medications   Medication Dose Route Frequency    [START ON 10/6/2020] VANCOMYCIN RANDOM LAB REMINDER   Other ONCE    ferrous sulfate 300 mg (60 mg iron)/5 mL oral syrup 300 mg  300 mg Per G Tube BID WITH MEALS    pantoprazole (PROTONIX) granules for oral suspension 40 mg  40 mg Per G Tube ACB    sevelamer carbonate (RENVELA) oral powder 2.4 g  2.4 g Per G Tube TID WITH MEALS    0.9% sodium chloride infusion 250 mL  250 mL IntraVENous PRN    epoetin giulia (EPOGEN;PROCRIT) injection 8,000 Units  8,000 Units IntraVENous DIALYSIS TUE, THU & SAT    docusate (COLACE) 50 mg/5 mL oral liquid 100 mg  100 mg Oral BID    heparin (porcine) pf 300 Units  300 Units InterCATHeter PRN    bicarbonate dialysis (PRISMASOL) BG K 4/Ca 2.5 5000 ml solution   Extracorporeal DIALYSIS CONTINUOUS    sodium chloride (NS) flush 5-40 mL  5-40 mL IntraVENous PRN    oxyCODONE IR (ROXICODONE) tablet 5 mg  5 mg Oral Q8H PRN    atorvastatin (LIPITOR) tablet 40 mg  40 mg Oral QHS    mirtazapine (REMERON) tablet 15 mg  15 mg Oral QHS    brimonidine (ALPHAGAN) 0.2 % ophthalmic solution 1 Drop  1 Drop Both Eyes TID    dorzolamide (TRUSOPT) 2 % ophthalmic solution 1 Drop  1 Drop Both Eyes TID    polyethylene glycol (MIRALAX) packet 17 g  17 g Oral DAILY    VANCOMYCIN INFORMATION NOTE 1 Each  1 Each Other Rx Dosing/Monitoring    EPINEPHrine (ADRENALIN) 5 mg in 0.9% sodium chloride 250 mL infusion  0-10 mcg/min IntraVENous TITRATE    DOPamine (INTROPIN) 400 mg in dextrose 5% 250 mL infusion  0-50 mcg/kg/min IntraVENous TITRATE    sodium chloride (NS) flush 5-40 mL  5-40 mL IntraVENous PRN    acetaminophen (TYLENOL) tablet 650 mg  650 mg Oral Q6H PRN    Or    acetaminophen (TYLENOL) suppository 650 mg  650 mg Rectal Q6H PRN    polyethylene glycol (MIRALAX) packet 17 g  17 g Oral DAILY PRN    promethazine (PHENERGAN) tablet 12.5 mg  12.5 mg Oral Q6H PRN    Or    ondansetron (ZOFRAN) injection 4 mg  4 mg IntraVENous Q6H PRN    potassium chloride 10 mEq in 100 ml IVPB  20 mEq IntraVENous PRN    magnesium sulfate 2 g/50 ml IVPB (premix or compounded)  2 g IntraVENous PRN    albuterol-ipratropium (DUO-NEB) 2.5 MG-0.5 MG/3 ML  3 mL Nebulization Q6H RT    NOREPINephrine (LEVOPHED) 8 mg in 5% dextrose 250mL (32 mcg/mL) infusion  0.5-16 mcg/min IntraVENous TITRATE    heparin porcine injection 5,000 Units  5,000 Units SubCUTAneous Q8H    cefepime (MAXIPIME) 2 g in 0.9% sodium chloride (MBP/ADV) 100 mL MBP  2 g IntraVENous Q24H    propofol (DIPRIVAN) 10 mg/mL infusion  5 mcg/kg/min IntraVENous TITRATE        Review of Systems    Can't be done  Objective:     Patient Vitals for the past 8 hrs:   BP Temp Pulse Resp SpO2   10/04/20 1157 (!) 119/56 -- 60 (!) 32 100 %   10/04/20 1114 (!) 115/50 -- (!) 59 20 100 %   10/04/20 1106 (!) 110/47 -- (!) 59 14 100 %   10/04/20 1000 (!) 122/51 -- (!) 59 (!) 32 100 %   10/04/20 0908 (!) 115/49 -- (!) 58 (!) 32 100 %   10/04/20 0814 -- -- (!) 59 13 100 %   10/04/20 0813 (!) 113/58 -- -- 21 100 %   10/04/20 0800 (!) 120/54 -- 60 22 100 %   10/04/20 0700 (!) 120/53 99 °F (37.2 °C) 60 22 100 %   10/04/20 0600 (!) 122/55 -- 61 18 100 %   10/04/20 0500 (!) 101/43 -- (!) 59 17 100 %     No intake/output data recorded.   10/02 1901 - 10/04 0700  In: 2242 [I.V.:782]  Out: 4000     Physical Exam:   Elderly male cachectic and severely malnourished  with out distress   Right IJ tunneled dialysis catheter  Neuro: He is not responding   Lungs - clear   Heart - no pericardial rub   abd- soft ,nontender   Ext - no edema       XR CHEST PORT   Final Result   IMPRESSION: Stable chest.      XR CHEST PORT   Final Result      XR CHEST PORT   Final Result   Impression: Underexpanded lungs with bibasilar atelectasis. Possible   hydrostatic edema. XR CHEST PORT   Final Result   IMPRESSION:   1. No new acute process. 2.  Support devices as above. XR CHEST PORT   Final Result   Impression:      Satisfactory position of endotracheal tube. Suboptimal position of enteric tube,   may be advanced 5-10 cm for optimal placement      XR CHEST PORT   Final Result   Impression: Right mainstem bronchus intubation. Repositioning needed. Underexpanded lungs with bibasilar atelectasis. Findings conveyed to the patient's nurse Daniela Haley on 9/29/2020 at 7:56 AM.         XR CHEST PORT   Final Result   Impression:    1. Distal tracheal intubation as described. (Please note that anterior-posterior   radiography is a one dimensional exam and cannot accurately determine the exact   position of support apparatus. If there is need to better localize structures in   space, a lateral radiograph may prove helpful, as clinically indicated). 2. No developing pulmonary parenchymal or pleural lesion.       XR CHEST PORT    (Results Pending)        Data Review   Recent Labs     10/04/20  0430 10/03/20  0510 10/02/20  1045 10/02/20  0410   WBC 19.5* 23.3*  --  17.7*   HGB 7.6* 8.0* 8.0* 6.4*   HCT 23.2* 24.1* 23.8* 19.6*   PLT 91* 80*  --  83*     Recent Labs     10/04/20  0430 10/03/20  0510 10/02/20  0410   * 123* 131*   K 4.1 4.8 4.4   CL 93* 90* 100   CO2 29 22 23   * 487* 229*   BUN 44* 55* 44*   CREA 4.12* 4.95* 4.11*   CA 8.4* 8.0* 8.0*   PHOS 3.9 4.9*  --    ALB  --  1.4*  --    ALT  --  31  --      No components found for: [place]  Recent Labs     10/03/20  0605 10/02/20  0220   PH 7.493* 7.49*   PCO2 27* 30*   PO2 88 103*   HCO3 23 24   FIO2 25.0 25     No results for input(s): INR, INREXT, INREXT, INREXT in the last 72 hours. Assessment:           Active Problems:    Cardiac arrest ([facility] [address] (9/25/2020)    ESRD-seen on HD -TTS schedule   uncontrolled diabetes - need high dose insulin sliding scale ? Moderate  hyponatremia with corrected sodium for high glucose  Is 132   corrected with HD - partially. Anemia- continue JOSE   Sepsis with pneumonia ? Severe malnutrition with alb 1.4 - carries poor prognosis. Secondary hyperparathyroidism-acceptable calcium and phosphorus  Plan:   Off CRRT since 9/29/20.    on dialysis-TTS   Continue JOSE  Start all the medications with normal saline if they are compatible

## 2020-10-05 NOTE — PROGRESS NOTES
Pulmonary ICU Progress Note    Subjective:   Daily Progress Note: 10/5/2020 10:09 AM    CC: intubated on ventilator    HPI:  71-year-old male transferred from [facility] as he was admitted there because of generalized weakness shortness of breath abdominal pain and fluid overload she was a renal failure getting hemodialysis. Patient subsequently coded cardiac arrest ACLS protocol was done he was found to be in PEA patient revived intubated on ventilator he has cardiac arrest at least 4 times now he is on vasopressors dopamine levo fed 7 also on vancomycin Maxipime so critical care consult was called for further evaluation   intubated on ventilator hemodynamically unstable on dopamine and levo fed and also getting (continuous renal replacement therapy) CRRT  10/2 remain intubated on ventilator off sedation  10/3 Now again on Levofed and also on Dopamine unresponsive off sedation  10/4 remains unresponsive on the ventilator on no sedation currently on dopamine Levophed discontinued  10/5 intubated on ventilator unresponsive on dopamine  Review of Systems  Unable to obtain    Objective:     Visit Vitals  BP (!) 108/51 (BP 1 Location: Left arm, BP Patient Position: At rest)   Pulse 62   Temp 98.3 °F (36.8 °C)   Resp 12   Ht 5' 9\" (1.753 m)   Wt 78.4 kg (172 lb 13.5 oz)   SpO2 100%   BMI 25.52 kg/m²      O2 Device: Ventilator    Temp (24hrs), Av.8 °F (37.1 °C), Min:98 °F (36.7 °C), Max:99.4 °F (37.4 °C)      No intake/output data recorded. 10/03 1901 - 10/05 0700  In: 2622 [I.V.:460]  Out: 2650       General:   Eyes are closed grimaces when try to open them   Head:  Normocephalic, without obvious abnormality, atraumatic. When eyelids are opened eyes are disconjugate with some random eye movement           Nose: Nares normal. Septum midline.  Mucosa normal.    Throat: Lips, mucosa, and tongue normal. Teeth and gums normal.   Neck: Supple, symmetrical, trachea midline, no adenopathy, thyroid: no enlargement/tenderness/nodules, no carotid bruit and no JVD. Lungs:    Bilateral rails   Chest wall:  No tenderness or deformity. Heart:  Regular rate and rhythm, S1, S2 normal, no murmur, click, rub or gallop. Abdomen:   Soft, non-tender. Bowel sounds normal. No masses,  No organomegaly. Extremities: Extremities normal, atraumatic, no cyanosis or edema. Pulses: 2+ and symmetric all extremities.            Neurologic:  Off sedation grimaces when you try to open his eyes eyes are disconjugate somewhat random to and fro motion when eyelids are open flaccid extremities           Data Review    Recent Results (from the past 24 hour(s))   GLUCOSE, POC    Collection Time: 10/04/20  7:12 PM   Result Value Ref Range    Glucose (POC) 292 (H) 65 - 100 mg/dL    Performed by [email or web address], POC    Collection Time: 10/05/20 12:30 AM   Result Value Ref Range    Glucose (POC) 291 (H) 65 - 100 mg/dL    Performed by [address]    Collection Time: 10/05/20  4:00 AM   Result Value Ref Range    Phosphorus 4.7 2.6 - 4.7 mg/dL   RENAL FUNCTION PANEL    Collection Time: 10/05/20  4:00 AM   Result Value Ref Range    Sodium 126 (L) 136 - 145 mmol/L    Potassium 3.9 3.5 - 5.1 mmol/L    Chloride 89 (L) 97 - 108 mmol/L    CO2 29 21 - 32 mmol/L    Anion gap 8 5 - 15 mmol/L    Glucose 371 (H) 65 - 100 mg/dL    BUN 53 (H) 6 - 20 mg/dL    Creatinine 4.89 (H) 0.70 - 1.30 mg/dL    BUN/Creatinine ratio 11 (L) 12 - 20      GFR est AA 14 (L) >60 ml/min/1.73m2    GFR est non-AA 12 (L) >60 ml/min/1.73m2    Calcium 8.2 (L) 8.5 - 10.1 mg/dL    Phosphorus 4.7 2.6 - 4.7 mg/dL    Albumin 1.2 (L) 3.5 - 5.0 g/dL   CBC WITH AUTOMATED DIFF    Collection Time: 10/05/20  4:00 AM   Result Value Ref Range    WBC 20.5 (H) 4.1 - 11.1 K/uL    RBC 2.48 (L) 4.10 - 5.70 M/uL    HGB 7.6 (L) 12.1 - 17.0 g/dL    HCT 22.8 (L) 36.6 - 50.3 %    MCV 91.9 80.0 - 99.0 FL    MCH 30.6 26.0 - 34.0 PG    MCHC 33.3 30.0 - 36.5 g/dL    RDW 17.5 (H) 11.5 - 14.5 %    PLATELET 98 (L) 240 - 400 K/uL    NEUTROPHILS 87 (H) 32 - 75 %    LYMPHOCYTES 2 (L) 12 - 49 %    MONOCYTES 10 5 - 13 %    EOSINOPHILS 0 0 - 7 %    BASOPHILS 0 0 - 1 %    IMMATURE GRANULOCYTES 1 (H) 0.0 - 0.5 %    ABS. NEUTROPHILS 18.0 (H) 1.8 - 8.0 K/UL    ABS. LYMPHOCYTES 0.5 (L) 0.8 - 3.5 K/UL    ABS. MONOCYTES 2.0 (H) 0.0 - 1.0 K/UL    ABS. EOSINOPHILS 0.0 0.0 - 0.4 K/UL    ABS. BASOPHILS 0.0 0.0 - 0.1 K/UL    ABS. IMM.  GRANS. 0.1 (H) 0.00 - 0.04 K/UL    DF AUTOMATED     BLOOD GAS, ARTERIAL    Collection Time: 10/05/20  5:38 AM   Result Value Ref Range    pH 7.56 (HH) 7.35 - 7.45      PCO2 32 (L) 35 - 45 mmHg    PO2 181 (H) 75 - 100 mmHg    O2  >95 %    BICARBONATE 30 (H) 22 - 26 mmol/L    BASE EXCESS 5.8 (H) 0 - 2 mmol/L    FIO2 25 %    Tidal volume 500      PRESSURE SUPPORT 15      EPAP/CPAP/PEEP 5      Sample source Arterial      SITE Right Radial      ESE'S TEST YES     GLUCOSE, POC    Collection Time: 10/05/20  5:59 AM   Result Value Ref Range    Glucose (POC) 300 (H) 65 - 100 mg/dL    Performed by [facility]    GLUCOSE, POC    Collection Time: 10/05/20  7:54 AM   Result Value Ref Range    Glucose (POC) 262 (H) 65 - 100 mg/dL    Performed by Letitia Warren        Current Facility-Administered Medications   Medication Dose Route Frequency    insulin lispro (HUMALOG) injection   SubCUTAneous Q6H    glucose chewable tablet 16 g  4 Tab Oral PRN    glucagon (GLUCAGEN) injection 1 mg  1 mg IntraMUSCular PRN    dextrose (D50W) injection syrg 12.5-25 g  25-50 mL IntraVENous PRN    metoclopramide HCl (REGLAN) injection 5 mg  5 mg IntraVENous Q6H    [START ON 10/6/2020] VANCOMYCIN RANDOM LAB REMINDER   Other ONCE    ferrous sulfate 300 mg (60 mg iron)/5 mL oral syrup 300 mg  300 mg Per G Tube BID WITH MEALS    pantoprazole (PROTONIX) granules for oral suspension 40 mg  40 mg Per G Tube ACB    sevelamer carbonate (RENVELA) oral powder 2.4 g  2.4 g Per G Tube TID WITH MEALS    0.9% sodium chloride infusion 250 mL  250 mL IntraVENous PRN    epoetin giulia (EPOGEN;PROCRIT) injection 8,000 Units  8,000 Units IntraVENous DIALYSIS TUE, THU & SAT    docusate (COLACE) 50 mg/5 mL oral liquid 100 mg  100 mg Oral BID    heparin (porcine) pf 300 Units  300 Units InterCATHeter PRN    bicarbonate dialysis (PRISMASOL) BG K 4/Ca 2.5 5000 ml solution   Extracorporeal DIALYSIS CONTINUOUS    sodium chloride (NS) flush 5-40 mL  5-40 mL IntraVENous PRN    oxyCODONE IR (ROXICODONE) tablet 5 mg  5 mg Oral Q8H PRN    atorvastatin (LIPITOR) tablet 40 mg  40 mg Oral QHS    mirtazapine (REMERON) tablet 15 mg  15 mg Oral QHS    brimonidine (ALPHAGAN) 0.2 % ophthalmic solution 1 Drop  1 Drop Both Eyes TID    dorzolamide (TRUSOPT) 2 % ophthalmic solution 1 Drop  1 Drop Both Eyes TID    polyethylene glycol (MIRALAX) packet 17 g  17 g Oral DAILY    VANCOMYCIN INFORMATION NOTE 1 Each  1 Each Other Rx Dosing/Monitoring    EPINEPHrine (ADRENALIN) 5 mg in 0.9% sodium chloride 250 mL infusion  0-10 mcg/min IntraVENous TITRATE    DOPamine (INTROPIN) 400 mg in dextrose 5% 250 mL infusion  0-50 mcg/kg/min IntraVENous TITRATE    sodium chloride (NS) flush 5-40 mL  5-40 mL IntraVENous PRN    acetaminophen (TYLENOL) tablet 650 mg  650 mg Oral Q6H PRN    Or    acetaminophen (TYLENOL) suppository 650 mg  650 mg Rectal Q6H PRN    polyethylene glycol (MIRALAX) packet 17 g  17 g Oral DAILY PRN    promethazine (PHENERGAN) tablet 12.5 mg  12.5 mg Oral Q6H PRN    Or    ondansetron (ZOFRAN) injection 4 mg  4 mg IntraVENous Q6H PRN    potassium chloride 10 mEq in 100 ml IVPB  20 mEq IntraVENous PRN    magnesium sulfate 2 g/50 ml IVPB (premix or compounded)  2 g IntraVENous PRN    albuterol-ipratropium (DUO-NEB) 2.5 MG-0.5 MG/3 ML  3 mL Nebulization Q6H RT    NOREPINephrine (LEVOPHED) 8 mg in 5% dextrose 250mL (32 mcg/mL) infusion  0.5-16 mcg/min IntraVENous TITRATE    heparin porcine injection 5,000 Units  5,000 Units SubCUTAneous Q8H    cefepime (MAXIPIME) 2 g in 0.9% sodium chloride (MBP/ADV) 100 mL MBP  2 g IntraVENous Q24H    propofol (DIPRIVAN) 10 mg/mL infusion  5 mcg/kg/min IntraVENous TITRATE         AP semierect portable at 0347 on 10/3/2020.     An endotracheal tube, nasogastric tube and central line remain in place.     The heart remains normal in size. There is calcified plaque in the aorta. Mild  interstitial edema is redemonstrated with similar features seen on the prior  study. There is no lobar consolidation or pleural fluid bilaterally.     IMPRESSION  IMPRESSION: Stable chest.  Assessment/Plan:     1. Acute hypoxic respiratory failure does have spontaneous respirations on the ventilator  2. Cardiac arrest CODE BLUE x4  3. MRSA septicemia  4. Aspiration pneumonia last chest x-ray relatively clear  5. Cardiogenic status post code versus septic shock currently on dopamine  6. End-stage renal disease on hemodialysis  7. Chronic Obstructive Pulmonary Disease with Severe Acute Exacerbation requiring inpatient hospitalization and management; no wheezing today  8. Body mass index is 22.88 kg/m².       RECOMMENDATIONS/PLAN:      1. Patient is intubated on ventilator tolerating current vent settings  2. Patient is off sedation since 9/30 remains obtunded does have self respirations and mild cough reflex to tracheal suctioning  3. Patient on dopamine  4. Aspiration pneumonia sputum with MRSA  5. Continue vancomycin and Maxipime   6. abdominal CAT scan was negative  7. Hemodynamically unstable we will continue current vent setting  SIMV mode        Total time spent with patient: 30 minutes.

## 2020-10-05 NOTE — PROGRESS NOTES
10/5/2020 4:29 PM    Admit Date: 9/25/2020    Admit Diagnosis: Cardiac arrest [facility]) [I46.9]  Cardiac arrest ([facility] [address] [I46.9]      Subjective:   He was seen and examined in the ICU. Remains on the ventilator. Could not respond to me. Clinical events are reviewed. Review of Systems   Cannot be done. Patient is on the ventilator and not responsive    Objective:      Visit Vitals  BP (!) 110/50   Pulse 76   Temp 98.2 °F (36.8 °C)   Resp 15   Ht 5' 9\" (1.753 m)   Wt 78.4 kg (172 lb 13.5 oz)   SpO2 100%   BMI 25.52 kg/m²       Physical Exam:  Ce Goldberg, Well Nourished, No Acute Distress, unresponsive d. Neck- supple, no JVD  CV- regular rate and rhythm no MRG  Lung- clear bilaterally, FiO2 is 25% on the ventilator  Abd- soft, nontender, nondistended  Ext- no edema bilaterally.   Skin- warm and dry  Tunneled hemodialysis catheter-exit site looks clean  Tube feeds ongoing with water flushes    Recent Results (from the past 24 hour(s))   GLUCOSE, POC    Collection Time: 10/04/20  7:12 PM   Result Value Ref Range    Glucose (POC) 292 (H) 65 - 100 mg/dL    Performed by [facility], POC    Collection Time: 10/05/20 12:30 AM   Result Value Ref Range    Glucose (POC) 291 (H) 65 - 100 mg/dL    Performed by [address]    Collection Time: 10/05/20  4:00 AM   Result Value Ref Range    Phosphorus 4.7 2.6 - 4.7 mg/dL   RENAL FUNCTION PANEL    Collection Time: 10/05/20  4:00 AM   Result Value Ref Range    Sodium 126 (L) 136 - 145 mmol/L    Potassium 3.9 3.5 - 5.1 mmol/L    Chloride 89 (L) 97 - 108 mmol/L    CO2 29 21 - 32 mmol/L    Anion gap 8 5 - 15 mmol/L    Glucose 371 (H) 65 - 100 mg/dL    BUN 53 (H) 6 - 20 mg/dL    Creatinine 4.89 (H) 0.70 - 1.30 mg/dL    BUN/Creatinine ratio 11 (L) 12 - 20      GFR est AA 14 (L) >60 ml/min/1.73m2    GFR est non-AA 12 (L) >60 ml/min/1.73m2    Calcium 8.2 (L) 8.5 - 10.1 mg/dL    Phosphorus 4.7 2.6 - 4.7 mg/dL    Albumin 1.2 (L) 3.5 - 5.0 g/dL   CBC WITH AUTOMATED DIFF    Collection Time: 10/05/20  4:00 AM   Result Value Ref Range    WBC 20.5 (H) 4.1 - 11.1 K/uL    RBC 2.48 (L) 4.10 - 5.70 M/uL    HGB 7.6 (L) 12.1 - 17.0 g/dL    HCT 22.8 (L) 36.6 - 50.3 %    MCV 91.9 80.0 - 99.0 FL    MCH 30.6 26.0 - 34.0 PG    MCHC 33.3 30.0 - 36.5 g/dL    RDW 17.5 (H) 11.5 - 14.5 %    PLATELET 98 (L) 949 - 400 K/uL    NEUTROPHILS 87 (H) 32 - 75 %    LYMPHOCYTES 2 (L) 12 - 49 %    MONOCYTES 10 5 - 13 %    EOSINOPHILS 0 0 - 7 %    BASOPHILS 0 0 - 1 %    IMMATURE GRANULOCYTES 1 (H) 0.0 - 0.5 %    ABS. NEUTROPHILS 18.0 (H) 1.8 - 8.0 K/UL    ABS. LYMPHOCYTES 0.5 (L) 0.8 - 3.5 K/UL    ABS. MONOCYTES 2.0 (H) 0.0 - 1.0 K/UL    ABS. EOSINOPHILS 0.0 0.0 - 0.4 K/UL    ABS. BASOPHILS 0.0 0.0 - 0.1 K/UL    ABS. IMM. GRANS. 0.1 (H) 0.00 - 0.04 K/UL    DF AUTOMATED     BLOOD GAS, ARTERIAL    Collection Time: 10/05/20  5:38 AM   Result Value Ref Range    pH 7.56 (HH) 7.35 - 7.45      PCO2 32 (L) 35 - 45 mmHg    PO2 181 (H) 75 - 100 mmHg    O2  >95 %    BICARBONATE 30 (H) 22 - 26 mmol/L    BASE EXCESS 5.8 (H) 0 - 2 mmol/L    FIO2 25 %    Tidal volume 500      PRESSURE SUPPORT 15      EPAP/CPAP/PEEP 5      Sample source Arterial      SITE Right Radial      ESE'S TEST YES     GLUCOSE, POC    Collection Time: 10/05/20  5:59 AM   Result Value Ref Range    Glucose (POC) 300 (H) 65 - 100 mg/dL    Performed by [id number], POC    Collection Time: 10/05/20  7:54 AM   Result Value Ref Range    Glucose (POC) 262 (H) 65 - 100 mg/dL    Performed by [address] [place] [address], POC    Collection Time: 10/05/20 11:23 AM   Result Value Ref Range    Glucose (POC) 246 (H) 65 - 100 mg/dL    Performed by [facility]           Intake/Output Summary (Last 24 hours) at 10/5/2020 1621  Last data filed at 10/5/2020 1100  Gross per 24 hour   Intake 1220 ml   Output 650 ml   Net 570 ml          XR Results (maximum last 3):   Results from [facility] encounter on 09/25/20   XR CHEST PORT    Narrative Chest, frontal view, 10/5/2020    History: Respiratory failure. Comparison: Including chest 10/3/2020. Findings: Defibrillator pads in place. Right ventricular catheter tip is at the  distal SVC. Endotracheal tube tip is 1.8 cm from the lizeth. Feeding tube  side-port is at the GE junction tip at the stomach. The cardiac silhouette is  within normal limits. The lungs are adequately expanded. There is pulmonary  vascular congestion and hydrostatic edema, not significantly changed. Aeration  of the left base has improved. No pneumothorax is identified. No pleural  effusions are evident. The osseous structures are stable. Impression Impression: Hydrostatic edema. Interval improvement in aeration at the left  base. XR CHEST PORT    Narrative Chest one view. Comparison 10/2/2020. AP semierect portable at 0347 on 10/3/2020. An endotracheal tube, nasogastric tube and central line remain in place. The heart remains normal in size. There is calcified plaque in the aorta. Mild  interstitial edema is redemonstrated with similar features seen on the prior  study. There is no lobar consolidation or pleural fluid bilaterally. Impression IMPRESSION: Stable chest.   XR CHEST PORT    Narrative 1 view comparison yesterday    ET and NG tube and central line remain    There is present mild interstitial edema, accentuated by lower lung volumes. No  air space disease, effusion or pneumothorax. Normal heart and no congestion       CT Results (maximum last 3): Results from [place] encounter on 09/21/20   CT ABD PELV WO CONT    Narrative PROCEDURE: CT ABD PELV WO CONT    HISTORY:Abdominal pain, fever    COMPARISON:Abdominal pelvis CT July 18, 2020      LIMITATIONS: None    TECHNIQUE: Axial images with multiplanar reconstruction. No IV contrast.    CHEST: Heart remains mildly enlarged.  Again present are bilateral pleural  effusions with dependent changes in the lungs. LIVER: Normal  GALLBLADDER: Removed  BILIARY TREE: Normal  PANCREAS: Fat planes around the pancreas are less distinct. No discrete. Pancreatic fluid collection. There are traces of fluid in the retroperitoneum. SPLEEN: Normal  ADRENAL GLANDS: Normal  KIDNEYS/URETERS: There are calcifications in both renal dannielle areas. These appear  to be vascular. Both kidneys show minimal perinephric stranding similar to the  prior study. No evidence of hydronephrosis or ureteral stone. Bladder is  unremarkable. RETROPERITONEUM/AORTA: Atherosclerotic changes again noted knee aorta and its  branches. No aneurysm or periaortic pathology  BOWEL/MESENTERY: Stomach and small bowel show no active process. There is  extensive colonic diverticulosis in the sigmoid region with scattered  diverticula elsewhere. No definite diverticulitis. APPENDIX: Not clearly seen  PERITONEAL CAVITY: Small amount of free fluid in the posterior pelvis  REPRODUCTIVE: Normal  BONE/TISSUES: No acute abnormality. OTHER: None      Impression IMPRESSION: Cardiomegaly with bilateral pleural effusions and dependent changes  in the lungs. Chronic or recurrent CHF? Indistinctness of the fat planes around the pancreas. There is chronic mild  perinephric stranding, a nonspecific finding. The changes around the pancreas  may be secondary to renal process. Correlate with laboratory data to exclude  low-grade pancreatitis. Diverticulosis without evidence of diverticulitis. Extensive atherosclerotic vascular disease. Small amount of free fluid in the pelvis. This is an abnormal finding in a male  but nonspecific. It could be secondary to any of the process is discussed above. IR Results (maximum last 3): No results found for this or any previous visit. VAS/US Results (maximum last 3): No results found for this or any previous visit.       Data Review:   Recent Labs     10/05/20  0400   *   K 3.9   BUN 53*   CREA 4.89*   WBC 20.5*   HGB 7.6*   HCT 22.8*   PLT 98*     [unfilled]   Assessment/Plan:     Active Problems:    Cardiac arrest ([facility] [address] (9/25/2020)        DIAGNOSES  Ventilator dependent respiratory failure  End-stage renal disease dependent on hemodialysis  Septic shock still on dopamine drip  Encephalopathy  Status post cardiac arrest  Anemia, severe with hemoglobin of 7.6  Hyponatremia, hypervolemic  Fluid overload    Discussion and plan: We will plan for dialysis tomorrow. Hopefully we are able to remove some fluid, but will be challenging with a low blood pressure. Plan for dialysis thrice weekly on a Tuesday, Thursday and Saturday schedule. Plan for fluid removal tomorrow and will administer albumin to avoid hypotension. Blood pressure seems fairly stable except with 2 drops below . Unable to wean off dopamine and still at 9 mcg.  ? Neuro status  ? Ability to wean off ventilator  Hyperglycemia ongoing- feeds at a very low rate. Raising the issue of ongoing or secondary sepsis. We will continue to follow. Unfortunately prognosis is poor.

## 2020-10-05 NOTE — PROGRESS NOTES
Bedside shift change report given to Lavon Mitchell RN (oncoming nurse) by Nixon Guerra RN (offgoing nurse). Report included the following information SBAR.

## 2020-10-05 NOTE — PROGRESS NOTES
Physician Progress Note Kevin Lemons 
CSN #:                  [id number] :                       1950 ADMIT DATE:       2020 10:16 PM 
[address] [facility] DATE: 
RESPONDING 
PROVIDER #:        Gem Flowers MD 
 
 
Electronically signed by:  Gem Flowers MD 10/5/2020 4:53 PM

## 2020-10-05 NOTE — PROGRESS NOTES
Progress Note    Patient: Eugenia Goldstein MRN: [id number]  SSN: [id number]    YOB: 1950  Age: 78 y.o. Sex: male      Admit Date: 9/25/2020    LOS: 10 days     Subjective:     69M, H/o ESRD on HD and HLD and HTN with cardiac arrest x 4, multiorgan dysfunction and acute hypoxic respiratory failure. He presented here from [facility] after cardiac arrest s/t possible aspiration pneumonia. Remains intubated, 5 mcg/kg/min of dopamine, inc 2/2 bob. Sedation off since 9/30, remains unresponsive. Prognosis poor.        Remains intubated  Off sedation,minimally responsive to deep stimulation  Remains febrile intermittently,. 3(vanco/cefepime/presumptive aspiration)  HGB 7.6  Remains on dopamine  Not tolerating weaning from vent. Review of Systems:  Unable to determine s/t intubated/sedate      Objective:     Visit Vitals  BP (!) 110/50   Pulse 76   Temp 98.2 °F (36.8 °C)   Resp 15   Ht 5' 9\" (1.753 m)   Wt 78.4 kg (172 lb 13.5 oz)   SpO2 100%   BMI 25.52 kg/m²       Intake/Output Summary (Last 24 hours)   Last data filed at 10/5/2020 1100  Gross per 24 hour   Intake 460 ml   Output 650 ml   Net -190 ml         Physical Exam:   General : Appearance:  Intubatedand unresponsive off of sedation  HEENT : ETT/ngt  Neck : no JVD, no masses noted  Lungs : Intubated, No wheezing, creps bases  CVS : Rhythm rate regular, S1+, S2+, no murmur or gallop  Abdomen : Soft, nontender, no organomegaly, bowel sounds active  Extremities : No edema noted,  pedal pulses palpable, LT fem cvc  Musculoskeletal : flaccid extremities  Skin : Moist, warm, skin turgor, no pathological rash  Lymphatic : No cervical lymphadenopathy.   Neurological :intubated, responsive to deep pain  Psychiatric : cannot be assessed, calm      Lab/Data Review:  Recent Results (from the past 24 hour(s))   GLUCOSE, POC    Collection Time: 10/05/20 12:30 AM   Result Value Ref Range    Glucose (POC) 291 (H) 65 - 100 mg/dL    Performed by [address]    PHOSPHORUS    Collection Time: 10/05/20  4:00 AM   Result Value Ref Range    Phosphorus 4.7 2.6 - 4.7 mg/dL   RENAL FUNCTION PANEL    Collection Time: 10/05/20  4:00 AM   Result Value Ref Range    Sodium 126 (L) 136 - 145 mmol/L    Potassium 3.9 3.5 - 5.1 mmol/L    Chloride 89 (L) 97 - 108 mmol/L    CO2 29 21 - 32 mmol/L    Anion gap 8 5 - 15 mmol/L    Glucose 371 (H) 65 - 100 mg/dL    BUN 53 (H) 6 - 20 mg/dL    Creatinine 4.89 (H) 0.70 - 1.30 mg/dL    BUN/Creatinine ratio 11 (L) 12 - 20      GFR est AA 14 (L) >60 ml/min/1.73m2    GFR est non-AA 12 (L) >60 ml/min/1.73m2    Calcium 8.2 (L) 8.5 - 10.1 mg/dL    Phosphorus 4.7 2.6 - 4.7 mg/dL    Albumin 1.2 (L) 3.5 - 5.0 g/dL   CBC WITH AUTOMATED DIFF    Collection Time: 10/05/20  4:00 AM   Result Value Ref Range    WBC 20.5 (H) 4.1 - 11.1 K/uL    RBC 2.48 (L) 4.10 - 5.70 M/uL    HGB 7.6 (L) 12.1 - 17.0 g/dL    HCT 22.8 (L) 36.6 - 50.3 %    MCV 91.9 80.0 - 99.0 FL    MCH 30.6 26.0 - 34.0 PG    MCHC 33.3 30.0 - 36.5 g/dL    RDW 17.5 (H) 11.5 - 14.5 %    PLATELET 98 (L) 338 - 400 K/uL    NEUTROPHILS 87 (H) 32 - 75 %    LYMPHOCYTES 2 (L) 12 - 49 %    MONOCYTES 10 5 - 13 %    EOSINOPHILS 0 0 - 7 %    BASOPHILS 0 0 - 1 %    IMMATURE GRANULOCYTES 1 (H) 0.0 - 0.5 %    ABS. NEUTROPHILS 18.0 (H) 1.8 - 8.0 K/UL    ABS. LYMPHOCYTES 0.5 (L) 0.8 - 3.5 K/UL    ABS. MONOCYTES 2.0 (H) 0.0 - 1.0 K/UL    ABS. EOSINOPHILS 0.0 0.0 - 0.4 K/UL    ABS. BASOPHILS 0.0 0.0 - 0.1 K/UL    ABS. IMM.  GRANS. 0.1 (H) 0.00 - 0.04 K/UL    DF AUTOMATED     BLOOD GAS, ARTERIAL    Collection Time: 10/05/20  5:38 AM   Result Value Ref Range    pH 7.56 (HH) 7.35 - 7.45      PCO2 32 (L) 35 - 45 mmHg    PO2 181 (H) 75 - 100 mmHg    O2  >95 %    BICARBONATE 30 (H) 22 - 26 mmol/L    BASE EXCESS 5.8 (H) 0 - 2 mmol/L    FIO2 25 %    Tidal volume 500      PRESSURE SUPPORT 15      EPAP/CPAP/PEEP 5      Sample source Arterial      SITE Right Radial      ESE'S TEST YES     GLUCOSE, POC Collection Time: 10/05/20  5:59 AM   Result Value Ref Range    Glucose (POC) 300 (H) 65 - 100 mg/dL    Performed by [id number], POC    Collection Time: 10/05/20  7:54 AM   Result Value Ref Range    Glucose (POC) 262 (H) 65 - 100 mg/dL    Performed by [facility], POC    Collection Time: 10/05/20 11:23 AM   Result Value Ref Range    Glucose (POC) 246 (H) 65 - 100 mg/dL    Performed by [address] [place] [address], POC    Collection Time: 10/05/20  4:40 PM   Result Value Ref Range    Glucose (POC) 193 (H) 65 - 100 mg/dL    Performed by [facility] Facility-Administered Medications:     insulin lispro (HUMALOG) injection, , SubCUTAneous, Q6H, Mikala Lazaro MD, Stopped at 10/05/20 1800    glucose chewable tablet 16 g, 4 Tab, Oral, PRN, Mikala Lazaro MD    glucagon [facility]) injection 1 mg, 1 mg, IntraMUSCular, PRN, Mikala Lazaro MD    dextrose (D50W) injection syrg 12.5-25 g, 25-50 mL, IntraVENous, PRN, Johnny Kurtz MD    metoclopramide HCl (REGLAN) injection 5 mg, 5 mg, IntraVENous, Q6H, Manjula Mendoza MD, 5 mg at 10/05/20 1130    [START ON 10/6/2020] VANCOMYCIN RANDOM LAB REMINDER, , Other, ONCE, Franco Tovar MD    ferrous sulfate 300 mg (60 mg iron)/5 mL oral syrup 300 mg, 300 mg, Per G Tube, BID WITH MEALS, Franco Lopes MD, 300 mg at 10/05/20 1703    pantoprazole (PROTONIX) granules for oral suspension 40 mg, 40 mg, Per G Tube, ACB, Franco Lopes MD, 40 mg at 10/05/20 0753    sevelamer carbonate (RENVELA) oral powder 2.4 g, 2.4 g, Per G Tube, TID WITH MEALS, Alexis Calabrese MD, Stopped at 10/05/20 1700    0.9% sodium chloride infusion 250 mL, 250 mL, IntraVENous, PRN, Alexis Calabrese MD    epoetin giulia (EPOGEN;PROCRIT) injection 8,000 Units, 8,000 Units, IntraVENous, DIALYSIS BUTCH MISTRY & SAT, Rich Burks MD, 8,000 Units at 10/03/20 1506    docusate (COLACE) 50 mg/5 mL oral liquid 100 mg, 100 mg, Oral, BID, Shantel Oates MD, 100 mg at 10/05/20 0902    heparin (porcine) pf 300 Units, 300 Units, InterCATHeter, PRN, Rich Burks MD, 300 Units at 10/02/20 1640    bicarbonate dialysis (PRISMASOL) BG K 4/Ca 2.5 5000 ml solution, , Extracorporeal, DIALYSIS CONTINUOUS, Rich Burks MD, Last Rate: 800 mL/hr at 09/28/20 1608, 800 mL/hr at 09/28/20 1608    sodium chloride (NS) flush 5-40 mL, 5-40 mL, IntraVENous, PRN, Chanel Lima MD    oxyCODONE IR (ROXICODONE) tablet 5 mg, 5 mg, Oral, Q8H PRN, Chanel Lima MD    atorvastatin (LIPITOR) tablet 40 mg, 40 mg, Oral, QHS, Chanel Lima MD, 40 mg at 10/04/20 2143    mirtazapine (REMERON) tablet 15 mg, 15 mg, Oral, QHS, Chanel Lima MD, 15 mg at 10/04/20 2143    brimonidine (ALPHAGAN) 0.2 % ophthalmic solution 1 Drop, 1 Drop, Both Eyes, TID, Chanel Lima MD, 1 Drop at 10/05/20 1530    dorzolamide (TRUSOPT) 2 % ophthalmic solution 1 Drop, 1 Drop, Both Eyes, TID, Chanel Lima MD, 1 Drop at 10/05/20 1530    polyethylene glycol (MIRALAX) packet 17 g, 17 g, Oral, DAILY, Chanel Lima MD, Stopped at 10/05/20 0900    VANCOMYCIN INFORMATION NOTE 1 Each, 1 Each, Other, Rx Dosing/Monitoring, Chanel Lima MD    EPINEPHrine (ADRENALIN) 5 mg in 0.9% sodium chloride 250 mL infusion, 0-10 mcg/min, IntraVENous, TITRATE, Chanel Lima MD, Last Rate: 6 mL/hr at 09/26/20 0109, 2 mcg/min at 09/26/20 0109    DOPamine (INTROPIN) 400 mg in dextrose 5% 250 mL infusion, 0-50 mcg/kg/min, IntraVENous, TITRATE, Chanel Lima MD, Last Rate: 32.2 mL/hr at 10/05/20 1835, 12.5 mcg/kg/min at 10/05/20 1835    sodium chloride (NS) flush 5-40 mL, 5-40 mL, IntraVENous, PRN, Chanel Lima MD    acetaminophen (TYLENOL) tablet 650 mg, 650 mg, Oral, Q6H PRN **OR** acetaminophen (TYLENOL) suppository 650 mg, 650 mg, Rectal, Q6H PRN, Chanel Lima MD    polyethylene glycol (MIRALAX) packet 17 g, 17 g, Oral, DAILY PRN, Moise Nails MD    promethazine (PHENERGAN) tablet 12.5 mg, 12.5 mg, Oral, Q6H PRN **OR** ondansetron (ZOFRAN) injection 4 mg, 4 mg, IntraVENous, Q6H PRN, Moise Nails MD    potassium chloride 10 mEq in 100 ml IVPB, 20 mEq, IntraVENous, PRN, Moise Nails MD, Last Rate: 200 mL/hr at 10/02/20 0905, 20 mEq at 10/02/20 0905    magnesium sulfate 2 g/50 ml IVPB (premix or compounded), 2 g, IntraVENous, PRN, Moise Nails MD, Last Rate: 50 mL/hr at 10/01/20 0921, 2 g at 10/01/20 0921    albuterol-ipratropium (DUO-NEB) 2.5 MG-0.5 MG/3 ML, 3 mL, Nebulization, Q6H RT, Moise Nails MD, 3 mL at 10/05/20 1259    NOREPINephrine (LEVOPHED) 8 mg in 5% dextrose 250mL (32 mcg/mL) infusion, 0.5-16 mcg/min, IntraVENous, TITRATE, Moise Nails MD, Stopped at 10/05/20 0300    heparin porcine injection 5,000 Units, 5,000 Units, SubCUTAneous, Q8H, Moise Nails MD, 5,000 Units at 10/05/20 1530    cefepime (MAXIPIME) 2 g in 0.9% sodium chloride (MBP/ADV) 100 mL MBP, 2 g, IntraVENous, Q24H, Moise Nails MD, Last Rate: 200 mL/hr at 10/05/20 0808, 2 g at 10/05/20 0808    propofol (DIPRIVAN) 10 mg/mL infusion, 5 mcg/kg/min, IntraVENous, TITRATE, Moise Nails MD, Stopped at 10/01/20 0800      Assessment and plan:      --Acute hypoxic respiratory failure : intubated & remains off sedation for several days, pulmonology following, weaning trials continue, failed    --Cardiac arrest x 4 + multiorgan dysfunction: likely aspiration event. On dopamine 2/2 bob. Likely significant anoxic injury. --Septic shock: on pressors/levophed/dopamine, vancomycin and cefepime     --aspiration pneumonia : vancomycin and cefepime    --AECOPD: complicates     --ESRD on HD: off crrt from 9/29,nephrology following. --Acute encephalopathy/ likely significant anoxic injury s/p cp arrest x 4. Off sedation remains minimally responsive to deep painful stim. D/w Pulmonology consult neuro.     --Anemia: multifactorial/fe def, aoci. Transfused 2 units prbc 10/2.  hgb 7.6  Poor prognosis. Continue icu care  D/w son today, states he and other sibling in agreement to continue aggressive care. Poor prognosis reiterated.           Signed By: Shaun Pagan MD     October 5, 2020

## 2020-10-06 NOTE — PROGRESS NOTES
Cardiopulmonary resuscitation :    CODE DEJON was called as patient lost pulse and went into asystole, before that he had ventricular tachycardia. Code was followed as per protocol, did not get any pulse back. During the court noticed on the monitor ventricular tachycardia and ventricular fibrillation,Torsades de pointes. Given magnesium. Did not get any pulse back, after 21 minutes we called of the cord at  04:28 this morning. At the end of the cord we noticed fine ventricular fibrillation and torsades is still but no palpable pulse and patient was not breathing. And he did not last more than few minutes. He is pronounced .

## 2020-10-06 NOTE — PROGRESS NOTES
Spiritual Care Assessment/Progress Note  [facility]      NAME: Eugenia Goldstein      MRN: [id number]  AGE: 78 y.o. SEX: male  Baptism Affiliation: No Nondenominational   Language: English     10/6/2020     Total Time (in minutes): 48     Spiritual Assessment begun in [address] [facility] ICU through conversation with:         [x]Patient        [] Family    [] Friend(s)        Reason for Consult: Death, Inpatient     Spiritual beliefs: (Please include comment if needed)     [] Identifies with a chip tradition:         [] Supported by a chip community:            [x] Claims no spiritual orientation:           [] Seeking spiritual identity:                [] Adheres to an individual form of spirituality:           [] Not able to assess:                           Identified resources for coping:      [] Prayer                               [] Music                  [] Guided Imagery     [] Family/friends                 [] Pet visits     [] Devotional reading                         [x] Unknown     [] Other:                                              Interventions offered during this visit: (See comments for more details)    Patient Interventions: Prayer (actual)           Plan of Care:     [] Support spiritual and/or cultural needs    [] Support AMD and/or advance care planning process      [] Support grieving process   [] Coordinate Rites and/or Rituals    [] Coordination with community clergy   [] No spiritual needs identified at this time   [] Detailed Plan of Care below (See Comments)  [] Make referral to Music Therapy  [] Make referral to Pet Therapy     [] Make referral to Addiction services  [] Make referral to [facility]  [] Make referral to Spiritual Care Partner  [] No future visits requested        [x] Follow up visits as needed     Comments:  responded to notice of Code Blue, 2nd this AM.  consulted with IDT, there were no visitors present.  Post patient's death,  accompanied RN as she contacted patient's son, son indicated no needs at this time.  and RN provided prayer of commendation at bedside, reflected on sanctity of life.  advised of availability. Timbo offered God's blessings to RN.      Visited by Elgin Maldonado, [facility]

## 2020-10-06 NOTE — DEATH NOTE
Code Blue called for patient with PRA arrest x 2. At time of first event, ROSC was obtained at 4 minutes. Family and on-call attending were notified. Son expressed wishes to have patient fully resuscitated should another even occur. The second event occurred and the family were notified of the patient's death at 26. Nursing supervisor Herb Zheng and the  on call were present to offer support. [facility] informed of patient's death. Awaiting return call.

## 2020-10-07 NOTE — PROGRESS NOTES
10: 22AM Inbound call from [facility].  Spoke to Salud [facility]). Liam Rosenthal is requesting the death note be faxed to [phone] @ [phone]. SW acknowledged understanding and agreed to send over death note via fax.        Meryl Lance, MIMI

## 2020-10-08 LAB
ATRIAL RATE: 40 BPM
ATRIAL RATE: 54 BPM
CALCULATED R AXIS, ECG10: 0 DEGREES
CALCULATED R AXIS, ECG10: 115 DEGREES
CALCULATED T AXIS, ECG11: -38 DEGREES
CALCULATED T AXIS, ECG11: 111 DEGREES
DIAGNOSIS, 93000: NORMAL
DIAGNOSIS, 93000: NORMAL
Q-T INTERVAL, ECG07: 390 MS
Q-T INTERVAL, ECG07: 512 MS
QRS DURATION, ECG06: 128 MS
QRS DURATION, ECG06: 144 MS
QTC CALCULATION (BEZET), ECG08: 402 MS
QTC CALCULATION (BEZET), ECG08: 417 MS
VENTRICULAR RATE, ECG03: 40 BPM
VENTRICULAR RATE, ECG03: 64 BPM

## 2020-10-27 ENCOUNTER — HOSPITAL ENCOUNTER (OUTPATIENT)
Dept: LAB | Age: 70
Discharge: HOME | End: 2020-10-27

## 2020-11-19 NOTE — ROUTINE PROCESS
CALL [facility] [place] TO SEE  CAN GET PT. MEDICATION LIST FOR CONTINUE OF CARE. NURSE TOOK MESSAGE SUPPOSE TO CALL BACK. clears
